# Patient Record
Sex: FEMALE | Race: WHITE | NOT HISPANIC OR LATINO | Employment: FULL TIME | URBAN - METROPOLITAN AREA
[De-identification: names, ages, dates, MRNs, and addresses within clinical notes are randomized per-mention and may not be internally consistent; named-entity substitution may affect disease eponyms.]

---

## 2020-10-13 ENCOUNTER — APPOINTMENT (OUTPATIENT)
Dept: OBGYN | Facility: CLINIC | Age: 25
End: 2020-10-13
Payer: COMMERCIAL

## 2020-10-14 ENCOUNTER — PRENATAL OFFICE VISIT (OUTPATIENT)
Dept: OBGYN | Facility: CLINIC | Age: 25
End: 2020-10-14
Payer: COMMERCIAL

## 2020-10-14 DIAGNOSIS — Z34.00 PRENATAL CARE, FIRST PREGNANCY: ICD-10-CM

## 2020-10-14 PROCEDURE — 99207 PR NO CHARGE NURSE ONLY: CPT | Performed by: OBSTETRICS & GYNECOLOGY

## 2020-10-14 RX ORDER — PRENATAL VIT/IRON FUM/FOLIC AC 27MG-0.8MG
1 TABLET ORAL DAILY
COMMUNITY
Start: 2020-07-30 | End: 2022-08-24

## 2020-10-14 RX ORDER — CETIRIZINE HYDROCHLORIDE 10 MG/1
10 TABLET ORAL DAILY
COMMUNITY

## 2020-10-14 SDOH — HEALTH STABILITY: MENTAL HEALTH: HOW OFTEN DO YOU HAVE 6 OR MORE DRINKS ON ONE OCCASION?: NOT ASKED

## 2020-10-14 SDOH — HEALTH STABILITY: MENTAL HEALTH: HOW OFTEN DO YOU HAVE A DRINK CONTAINING ALCOHOL?: NOT ASKED

## 2020-10-14 SDOH — HEALTH STABILITY: MENTAL HEALTH: HOW MANY STANDARD DRINKS CONTAINING ALCOHOL DO YOU HAVE ON A TYPICAL DAY?: NOT ASKED

## 2020-10-14 NOTE — PROGRESS NOTES
Some teaching done as patient has had only one visit with no labs, US or teaching done'  Rachel Boone OB Intake Nurse

## 2020-10-19 ENCOUNTER — AMBULATORY - HEALTHEAST (OUTPATIENT)
Dept: MATERNAL FETAL MEDICINE | Facility: HOSPITAL | Age: 25
End: 2020-10-19

## 2020-10-19 ENCOUNTER — PRENATAL OFFICE VISIT (OUTPATIENT)
Dept: OBGYN | Facility: CLINIC | Age: 25
End: 2020-10-19
Payer: COMMERCIAL

## 2020-10-19 VITALS
HEART RATE: 101 BPM | BODY MASS INDEX: 30.83 KG/M2 | DIASTOLIC BLOOD PRESSURE: 70 MMHG | RESPIRATION RATE: 18 BRPM | WEIGHT: 174 LBS | HEIGHT: 63 IN | SYSTOLIC BLOOD PRESSURE: 121 MMHG | TEMPERATURE: 98.8 F

## 2020-10-19 DIAGNOSIS — Z34.02 ENCOUNTER FOR PRENATAL CARE IN SECOND TRIMESTER OF FIRST PREGNANCY: Primary | ICD-10-CM

## 2020-10-19 DIAGNOSIS — O26.90 PREGNANCY, ANTEPARTUM, COMPLICATIONS: ICD-10-CM

## 2020-10-19 LAB
ABO + RH BLD: NORMAL
ABO + RH BLD: NORMAL
ALBUMIN UR-MCNC: NEGATIVE MG/DL
APPEARANCE UR: CLEAR
BILIRUB UR QL STRIP: NEGATIVE
BLD GP AB SCN SERPL QL: NORMAL
BLOOD BANK CMNT PATIENT-IMP: NORMAL
COLOR UR AUTO: YELLOW
ERYTHROCYTE [DISTWIDTH] IN BLOOD BY AUTOMATED COUNT: 12.1 % (ref 10–15)
GLUCOSE UR STRIP-MCNC: NEGATIVE MG/DL
HBV SURFACE AG SERPL QL IA: NONREACTIVE
HCT VFR BLD AUTO: 40.8 % (ref 35–47)
HGB BLD-MCNC: 14.1 G/DL (ref 11.7–15.7)
HGB UR QL STRIP: NEGATIVE
HIV 1+2 AB+HIV1 P24 AG SERPL QL IA: NONREACTIVE
KETONES UR STRIP-MCNC: ABNORMAL MG/DL
LEUKOCYTE ESTERASE UR QL STRIP: ABNORMAL
MCH RBC QN AUTO: 30.7 PG (ref 26.5–33)
MCHC RBC AUTO-ENTMCNC: 34.6 G/DL (ref 31.5–36.5)
MCV RBC AUTO: 89 FL (ref 78–100)
MUCOUS THREADS #/AREA URNS LPF: PRESENT /LPF
NITRATE UR QL: NEGATIVE
NON-SQ EPI CELLS #/AREA URNS LPF: ABNORMAL /LPF
PH UR STRIP: 6.5 PH (ref 5–7)
PLATELET # BLD AUTO: 210 10E9/L (ref 150–450)
RBC # BLD AUTO: 4.59 10E12/L (ref 3.8–5.2)
RBC #/AREA URNS AUTO: ABNORMAL /HPF
SOURCE: ABNORMAL
SP GR UR STRIP: 1.02 (ref 1–1.03)
SPECIMEN EXP DATE BLD: NORMAL
UROBILINOGEN UR STRIP-ACNC: 0.2 EU/DL (ref 0.2–1)
WBC # BLD AUTO: 10.4 10E9/L (ref 4–11)
WBC #/AREA URNS AUTO: ABNORMAL /HPF

## 2020-10-19 PROCEDURE — 86901 BLOOD TYPING SEROLOGIC RH(D): CPT | Performed by: OBSTETRICS & GYNECOLOGY

## 2020-10-19 PROCEDURE — 36415 COLL VENOUS BLD VENIPUNCTURE: CPT | Performed by: OBSTETRICS & GYNECOLOGY

## 2020-10-19 PROCEDURE — 85027 COMPLETE CBC AUTOMATED: CPT | Performed by: OBSTETRICS & GYNECOLOGY

## 2020-10-19 PROCEDURE — 99000 SPECIMEN HANDLING OFFICE-LAB: CPT | Performed by: OBSTETRICS & GYNECOLOGY

## 2020-10-19 PROCEDURE — 86850 RBC ANTIBODY SCREEN: CPT | Performed by: OBSTETRICS & GYNECOLOGY

## 2020-10-19 PROCEDURE — 86762 RUBELLA ANTIBODY: CPT | Performed by: OBSTETRICS & GYNECOLOGY

## 2020-10-19 PROCEDURE — 87389 HIV-1 AG W/HIV-1&-2 AB AG IA: CPT | Performed by: OBSTETRICS & GYNECOLOGY

## 2020-10-19 PROCEDURE — 87086 URINE CULTURE/COLONY COUNT: CPT | Performed by: OBSTETRICS & GYNECOLOGY

## 2020-10-19 PROCEDURE — 99207 PR FIRST OB VISIT: CPT | Performed by: OBSTETRICS & GYNECOLOGY

## 2020-10-19 PROCEDURE — 86780 TREPONEMA PALLIDUM: CPT | Mod: 90 | Performed by: OBSTETRICS & GYNECOLOGY

## 2020-10-19 PROCEDURE — 87340 HEPATITIS B SURFACE AG IA: CPT | Performed by: OBSTETRICS & GYNECOLOGY

## 2020-10-19 PROCEDURE — 86900 BLOOD TYPING SEROLOGIC ABO: CPT | Performed by: OBSTETRICS & GYNECOLOGY

## 2020-10-19 PROCEDURE — 81001 URINALYSIS AUTO W/SCOPE: CPT | Performed by: OBSTETRICS & GYNECOLOGY

## 2020-10-19 ASSESSMENT — MIFFLIN-ST. JEOR: SCORE: 1505.45

## 2020-10-19 NOTE — NURSING NOTE
"Initial /70 (BP Location: Right arm, Patient Position: Chair, Cuff Size: Adult Regular)   Pulse 101   Temp 98.8  F (37.1  C) (Tympanic)   Resp 18   Ht 1.588 m (5' 2.5\")   Wt 78.9 kg (174 lb)   LMP 06/18/2020   BMI 31.32 kg/m   Estimated body mass index is 31.32 kg/m  as calculated from the following:    Height as of this encounter: 1.588 m (5' 2.5\").    Weight as of this encounter: 78.9 kg (174 lb). .      "

## 2020-10-19 NOTE — PROGRESS NOTES
"[facility]   OB/GYN Clinic    CC: New OB     Subjective:    Rosibel is a 25 year old  at 17w4d by LMP who presents for [facility] OB visit. She reports feeling well. Denies any uterine cramping, abdominal pain or vaginal bleeding. Denies nausea and vomiting.   Prior to a +UPT, she reports regular monthly periods. Reports some prenatal care at [facility] but states no labs or ultrasounds were done.      OB History    Para Term  AB Living   1 0 0 0 0 0   SAB TAB Ectopic Multiple Live Births   0 0 0 0 0      # Outcome Date GA Lbr Og/2nd Weight Sex Delivery Anes PTL Lv   1 Current                Past Medical History:   Diagnosis Date     Chickenpox        Past Surgical History:   Procedure Laterality Date     ADENOIDECTOMY       LAPAROSCOPIC APPENDECTOMY       MOUTH SURGERY         Current Outpatient Medications   Medication Sig Dispense Refill     cetirizine (ZYRTEC) 10 MG tablet Take 10 mg by mouth daily       Prenatal Vit-Fe Fumarate-FA (PRENATAL MULTIVITAMIN W/IRON) 27-0.8 MG tablet Take 1 tablet by mouth daily         Family History   Problem Relation Age of Onset     Seizure Disorder Mother      Spina bifida Mother      Colon Cancer Mother      Diabetes Mother      No Known Problems Father      Cerebrovascular Disease Maternal Grandmother      Bleeding Disorder Maternal Grandmother         ?history of blood clots     Hypertension Maternal Grandfather        Social History     Tobacco Use     Smoking status: Never Smoker     Smokeless tobacco: Never Used   Substance Use Topics     Alcohol use: Not Currently     Comment: occas-quit with pregnancy       ROS: A 10 pt ROS was completed and found to be negative unless mentioned in the HPI.     Objective:  /70 (BP Location: Right arm, Patient Position: Chair, Cuff Size: Adult Regular)   Pulse 101   Temp 98.8  F (37.1  C) (Tympanic)   Resp 18   Ht 1.588 m (5' 2.5\")   Wt 78.9 kg (174 lb)   LMP 2020   BMI 31.32 kg/m  " "    Estimated body mass index is 31.32 kg/m  as calculated from the following:    Height as of this encounter: 1.588 m (5' 2.5\").    Weight as of this encounter: 78.9 kg (174 lb).    Physical Exam:  Gen: Pleasant, talkative female in no apparent distress   Respiratory: Lungs clear, breathing comfortably on room air   Cardiac: Regular rate, well perfused  GI: Abd soft and non-tender   MSK: Grossly normal movement of all four extremities  Psych: mood and affect bright   Lower extremity: edema not present     Fetal dop tones: 150s bpm      Assessment/Plan:   25 year old  at 17w4d who presents for her initial OB visit.   1) Plan to draw new OB lab today (T&S, CBC, HIV, RPR, HepBsAg, Hep B antibody, rubella, UC). Plan to complete pelvic/GC/Chlam next visit.   2) Discussed routine prenatal care, group practice model at [facility], tertiary support and frequency of visits. Declines genetic testing  3) Plan for level II anatomy US given maternal hx of spina bifida  4) Concerns:    States that she is feeling anxious and sad as FOB not excited about pregnancy, denies thoughts of SI/HI, amenable to visiting with DENIS Rehman, will continue to readdress  5) Medication review: no changes, continue prenatal vitamin   6) PAP smear: NILM 2019  7) Immunizations: flu shot states she already had, Tdap at 28wks    Return to clinic in 4 weeks.     Mary Dunbar MD   10/19/2020 10:43 AM   "

## 2020-10-20 LAB
BACTERIA SPEC CULT: NORMAL
Lab: NORMAL
RUBV IGG SERPL IA-ACNC: 68 IU/ML
SPECIMEN SOURCE: NORMAL
T PALLIDUM AB SER QL: NONREACTIVE

## 2020-10-21 NOTE — RESULT ENCOUNTER NOTE
Will forward to [facility] pool for pt notification of normal result.    Christal Lerner   Ob/Gyn Clinic  RN

## 2020-10-26 ENCOUNTER — OFFICE VISIT - HEALTHEAST (OUTPATIENT)
Dept: MATERNAL FETAL MEDICINE | Facility: HOSPITAL | Age: 25
End: 2020-10-26

## 2020-10-26 ENCOUNTER — RECORDS - HEALTHEAST (OUTPATIENT)
Dept: ULTRASOUND IMAGING | Facility: HOSPITAL | Age: 25
End: 2020-10-26

## 2020-10-26 ENCOUNTER — RECORDS - HEALTHEAST (OUTPATIENT)
Dept: ADMINISTRATIVE | Facility: OTHER | Age: 25
End: 2020-10-26

## 2020-10-26 DIAGNOSIS — O35.8XX0 FAMILY OR MATERNAL HISTORIC RISK OF CONGENITAL ANOMALY, ANTEPARTUM, SINGLE OR UNSPECIFIED FETUS: ICD-10-CM

## 2020-10-26 DIAGNOSIS — O26.90 PREGNANCY RELATED CONDITIONS, UNSPECIFIED, UNSPECIFIED TRIMESTER: ICD-10-CM

## 2020-10-26 DIAGNOSIS — O26.90 PREGNANCY, ANTEPARTUM, COMPLICATIONS: ICD-10-CM

## 2020-10-26 DIAGNOSIS — Z82.79 FAMILY HISTORY OF SPINA BIFIDA: ICD-10-CM

## 2020-11-16 ENCOUNTER — PRENATAL OFFICE VISIT (OUTPATIENT)
Dept: OBGYN | Facility: CLINIC | Age: 25
End: 2020-11-16
Payer: COMMERCIAL

## 2020-11-16 VITALS
BODY MASS INDEX: 32.2 KG/M2 | RESPIRATION RATE: 14 BRPM | WEIGHT: 181.7 LBS | HEIGHT: 63 IN | HEART RATE: 93 BPM | DIASTOLIC BLOOD PRESSURE: 76 MMHG | SYSTOLIC BLOOD PRESSURE: 109 MMHG | TEMPERATURE: 99.2 F

## 2020-11-16 DIAGNOSIS — Z34.02 ENCOUNTER FOR PRENATAL CARE IN SECOND TRIMESTER OF FIRST PREGNANCY: Primary | ICD-10-CM

## 2020-11-16 PROCEDURE — 99207 PR PRENATAL VISIT: CPT | Performed by: OBSTETRICS & GYNECOLOGY

## 2020-11-16 PROCEDURE — 87491 CHLMYD TRACH DNA AMP PROBE: CPT | Performed by: OBSTETRICS & GYNECOLOGY

## 2020-11-16 PROCEDURE — 87591 N.GONORRHOEAE DNA AMP PROB: CPT | Performed by: OBSTETRICS & GYNECOLOGY

## 2020-11-16 ASSESSMENT — MIFFLIN-ST. JEOR: SCORE: 1540.38

## 2020-11-16 NOTE — NURSING NOTE
"Initial /76 (BP Location: Left arm, Patient Position: Sitting, Cuff Size: Adult Regular)   Pulse 93   Temp 99.2  F (37.3  C) (Tympanic)   Resp 14   Ht 1.588 m (5' 2.5\")   Wt 82.4 kg (181 lb 11.2 oz)   LMP 06/18/2020   BMI 32.70 kg/m   Estimated body mass index is 32.7 kg/m  as calculated from the following:    Height as of this encounter: 1.588 m (5' 2.5\").    Weight as of this encounter: 82.4 kg (181 lb 11.2 oz). .      "

## 2020-11-16 NOTE — PROGRESS NOTES
"[facility]   OB/GYN Clinic    CC: Return OB     Subjective:    Rosibel is a 25 year old  at 21w4d  who presents for return OB visit. She reports feeling well. Denies uterine cramping, vaginal bleeding or leaking, dysuria. +fetal movement.   Objective:  /76 (BP Location: Left arm, Patient Position: Sitting, Cuff Size: Adult Regular)   Pulse 93   Temp 99.2  F (37.3  C) (Tympanic)   Resp 14   Ht 1.588 m (5' 2.5\")   Wt 82.4 kg (181 lb 11.2 oz)   LMP 2020   BMI 32.70 kg/m      Estimated body mass index is 32.7 kg/m  as calculated from the following:    Height as of this encounter: 1.588 m (5' 2.5\").    Weight as of this encounter: 82.4 kg (181 lb 11.2 oz).    Physical Exam:  Gen: Pleasant, talkative female in no apparent distress   Respiratory: breathing comfortably on room air   Cardiac: Warm and well-perfused.   GI: Abd soft and non-tender, gravid  : normal appearing external genitalia, vaginal musoca, cervix  MSK: Grossly normal movement of all four extremities  Psych: mood and affect bright   Lower extremity: edema not present     Fetal dop tones: 150s bpm      Assessment/Plan:   25 year old  at 21w4d who presents for her initial OB visit.   1) Plan to draw new OB lab today (T&S, CBC, HIV, RPR, HepBsAg, Hep B antibody, rubella, UC). GC/Chlam collected today  2) Discussed routine prenatal care, group practice model at [facility], tertiary support and frequency of visits. Declines genetic testing  3) Level II WNL  4) Concerns:               States that she is feeling anxious and sad as FOB not excited about pregnancy, denies thoughts of SI/HI, amenable to visiting with DENIS Rehman, will continue to readdress  5) Medication review: no changes, continue prenatal vitamin   6) PAP smear: NILM   7) Immunizations: flu shot already given, Tdap at 28wks    Return to clinic in 4 weeks.     Mary Dunbar MD  2020 9:56 AM       "

## 2020-11-17 LAB
C TRACH DNA SPEC QL NAA+PROBE: NEGATIVE
N GONORRHOEA DNA SPEC QL NAA+PROBE: NEGATIVE
SPECIMEN SOURCE: NORMAL
SPECIMEN SOURCE: NORMAL

## 2020-12-14 ENCOUNTER — PRENATAL OFFICE VISIT (OUTPATIENT)
Dept: OBGYN | Facility: CLINIC | Age: 25
End: 2020-12-14
Payer: COMMERCIAL

## 2020-12-14 VITALS
BODY MASS INDEX: 34.02 KG/M2 | SYSTOLIC BLOOD PRESSURE: 115 MMHG | WEIGHT: 189 LBS | HEART RATE: 103 BPM | DIASTOLIC BLOOD PRESSURE: 82 MMHG | TEMPERATURE: 98.5 F

## 2020-12-14 DIAGNOSIS — Z34.02 ENCOUNTER FOR PRENATAL CARE IN SECOND TRIMESTER OF FIRST PREGNANCY: ICD-10-CM

## 2020-12-14 DIAGNOSIS — Z34.92 PRENATAL CARE, SECOND TRIMESTER: Primary | ICD-10-CM

## 2020-12-14 LAB
ERYTHROCYTE [DISTWIDTH] IN BLOOD BY AUTOMATED COUNT: 12.2 % (ref 10–15)
GLUCOSE 1H P 50 G GLC PO SERPL-MCNC: 104 MG/DL (ref 60–129)
HCT VFR BLD AUTO: 37.3 % (ref 35–47)
HGB BLD-MCNC: 12.6 G/DL (ref 11.7–15.7)
MCH RBC QN AUTO: 31.3 PG (ref 26.5–33)
MCHC RBC AUTO-ENTMCNC: 33.8 G/DL (ref 31.5–36.5)
MCV RBC AUTO: 93 FL (ref 78–100)
PLATELET # BLD AUTO: 241 10E9/L (ref 150–450)
RBC # BLD AUTO: 4.03 10E12/L (ref 3.8–5.2)
WBC # BLD AUTO: 13.4 10E9/L (ref 4–11)

## 2020-12-14 PROCEDURE — 99000 SPECIMEN HANDLING OFFICE-LAB: CPT | Performed by: OBSTETRICS & GYNECOLOGY

## 2020-12-14 PROCEDURE — 86780 TREPONEMA PALLIDUM: CPT | Performed by: OBSTETRICS & GYNECOLOGY

## 2020-12-14 PROCEDURE — 36415 COLL VENOUS BLD VENIPUNCTURE: CPT | Performed by: OBSTETRICS & GYNECOLOGY

## 2020-12-14 PROCEDURE — 85027 COMPLETE CBC AUTOMATED: CPT | Performed by: OBSTETRICS & GYNECOLOGY

## 2020-12-14 PROCEDURE — 82950 GLUCOSE TEST: CPT | Performed by: OBSTETRICS & GYNECOLOGY

## 2020-12-14 PROCEDURE — 99207 PR PRENATAL VISIT: CPT | Performed by: OBSTETRICS & GYNECOLOGY

## 2020-12-14 NOTE — NURSING NOTE
"Initial /82 (BP Location: Right arm, Patient Position: Chair, Cuff Size: Adult Large)   Pulse 103   Temp 98.5  F (36.9  C) (Tympanic)   Wt 85.7 kg (189 lb)   LMP 06/18/2020   Breastfeeding No   BMI 34.02 kg/m   Estimated body mass index is 34.02 kg/m  as calculated from the following:    Height as of 11/16/20: 1.588 m (5' 2.5\").    Weight as of this encounter: 85.7 kg (189 lb). .    Chani Hayes MA    "

## 2020-12-14 NOTE — LETTER
[facility]  [address] [address]  Phone: [phone]      December 14, 2020      Rosibel Saeed  [address] [address]              To whom it may concern:    Rosibel Saeed is being seen in our clinic for prenatal care.  Her Estimated Date of Delivery: Mar 25, 2021.  Her Last menstrual period was Patient's last menstrual period was 06/18/2020..      Sincerely,    Mary Dunbar MD

## 2020-12-14 NOTE — PROGRESS NOTES
[facility]   OB/GYN Clinic     CC: Return OB      Subjective:     Rosibel is a 25 year old  at 25w4d who presents for return OB visit. She reports feeling well. Denies uterine cramping, vaginal bleeding or leaking, dysuria. +fetal movement.     Objective:  /82 (BP Location: Right arm, Patient Position: Chair, Cuff Size: Adult Large)   Pulse 103   Temp 98.5  F (36.9  C) (Tympanic)   Wt 85.7 kg (189 lb)   LMP 2020   Breastfeeding No   BMI 34.02 kg/m        Physical Exam:  Gen: Pleasant, talkative female in no apparent distress   Respiratory: breathing comfortably on room air   Cardiac: Warm and well-perfused.   GI: Abd soft and non-tender, gravid  : normal appearing external genitalia, vaginal musoca, cervix  MSK: Grossly normal movement of all four extremities  Psych: mood and affect bright   Lower extremity: edema not present      Fetal dop tones: 150s bpm        Assessment/Plan:   25 year old  at 25w4d who presents for her initial OB visit.   1) new OB WNL  2) Discussed routine prenatal care, group practice model at [facility], tertiary support and frequency of visits. Declines genetic testing  3) Level II WNL  4) Concerns:               States that she is feeling anxious and sad as FOB not excited about pregnancy, denies thoughts of SI/HI, amenable to visiting with DENIS Rehman, will continue to readdress  5) Medication review: no changes, continue prenatal vitamin   6) PAP smear: NILM 2019  7) Immunizations: flu shot already given, Tdap at 28wks     Return to clinic in 4 weeks.     Mary Dunbar MD   2020 3:52 PM

## 2020-12-15 LAB — T PALLIDUM AB SER QL: NONREACTIVE

## 2021-01-04 ENCOUNTER — HEALTH MAINTENANCE LETTER (OUTPATIENT)
Age: 26
End: 2021-01-04

## 2021-01-11 ENCOUNTER — PRENATAL OFFICE VISIT (OUTPATIENT)
Dept: OBGYN | Facility: CLINIC | Age: 26
End: 2021-01-11
Payer: COMMERCIAL

## 2021-01-11 VITALS
RESPIRATION RATE: 14 BRPM | HEIGHT: 63 IN | DIASTOLIC BLOOD PRESSURE: 71 MMHG | TEMPERATURE: 98.7 F | BODY MASS INDEX: 35.15 KG/M2 | SYSTOLIC BLOOD PRESSURE: 115 MMHG | WEIGHT: 198.4 LBS | HEART RATE: 106 BPM

## 2021-01-11 DIAGNOSIS — Z34.03 ENCOUNTER FOR PRENATAL CARE IN THIRD TRIMESTER OF FIRST PREGNANCY: Primary | ICD-10-CM

## 2021-01-11 DIAGNOSIS — Z23 NEED FOR TDAP VACCINATION: ICD-10-CM

## 2021-01-11 PROCEDURE — 90715 TDAP VACCINE 7 YRS/> IM: CPT | Performed by: OBSTETRICS & GYNECOLOGY

## 2021-01-11 PROCEDURE — 90471 IMMUNIZATION ADMIN: CPT | Performed by: OBSTETRICS & GYNECOLOGY

## 2021-01-11 PROCEDURE — 99207 PR PRENATAL VISIT: CPT | Performed by: OBSTETRICS & GYNECOLOGY

## 2021-01-11 RX ORDER — ACETAMINOPHEN 325 MG/1
325-650 TABLET ORAL EVERY 6 HOURS PRN
Status: ON HOLD | COMMUNITY
End: 2021-04-01

## 2021-01-11 ASSESSMENT — MIFFLIN-ST. JEOR: SCORE: 1616.13

## 2021-01-11 NOTE — PROGRESS NOTES
Prior to immunization administration, verified patients identity using patient s name and date of birth. Please see Immunization Activity for additional information.     Screening Questionnaire for Adult Immunization    Are you sick today?   No   Do you have allergies to medications, food, a vaccine component or latex?   No   Have you ever had a serious reaction after receiving a vaccination?   No   Do you have a long-term health problem with heart, lung, kidney, or metabolic disease (e.g., diabetes), asthma, a blood disorder, no spleen, complement component deficiency, a cochlear implant, or a spinal fluid leak?  Are you on long-term aspirin therapy?   No   Do you have cancer, leukemia, HIV/AIDS, or any other immune system problem?   No   Do you have a parent, brother, or sister with an immune system problem?   No   In the past 3 months, have you taken medications that affect  your immune system, such as prednisone, other steroids, or anticancer drugs; drugs for the treatment of rheumatoid arthritis, Crohn s disease, or psoriasis; or have you had radiation treatments?   No   Have you had a seizure, or a brain or other nervous system problem?   No   During the past year, have you received a transfusion of blood or blood    products, or been given immune (gamma) globulin or antiviral drug?   No   For women: Are you pregnant or is there a chance you could become       pregnant during the next month?   Yes   Have you received any vaccinations in the past 4 weeks?   No       Per orders of Dr. Engle, injection of Tdap given by Dyan Ac LPN. Patient instructed to remain in clinic for 15 minutes afterwards, and to report any adverse reaction to me immediately.       Screening performed by Dyan Ac LPN on 1/11/2021 at 4:17 PM.

## 2021-01-11 NOTE — PROGRESS NOTES
"[facility]   OB/GYN Clinic     CC: Return OB      Subjective:     Rosibel is a 25 year old  at 29w4d who presents for return OB visit. She reports feeling well. Denies uterine cramping, vaginal bleeding or leaking, dysuria. +fetal movement.      Objective:  /71 (BP Location: Right arm, Patient Position: Chair, Cuff Size: Adult Large)   Pulse 106   Temp 98.7  F (37.1  C) (Tympanic)   Resp 14   Ht 1.588 m (5' 2.5\")   Wt 90 kg (198 lb 6.4 oz)   LMP 2020   BMI 35.71 kg/m       Physical Exam:  Gen: Pleasant, talkative female in no apparent distress   Respiratory: breathing comfortably on room air   Cardiac: Warm and well-perfused.   GI: Abd soft and non-tender, gravid  : normal appearing external genitalia, vaginal musoca, cervix  MSK: Grossly normal movement of all four extremities  Psych: mood and affect bright   Lower extremity: edema not present      See OB flowsheet     Assessment/Plan:   25 year old  at 29w4d who presents for her initial OB visit.   1) new OB WNL  2) Discussed routine prenatal care, group practice model at [facility], tertiary support and frequency of visits. Declines genetic testing  3) Level II WNL  4) Concerns: none  5) Medication review: no changes, continue prenatal vitamin   6) PAP smear: NILM 2019  7) Immunizations: flu shot already given, s/p Tdap   8) 3rd tri labs nl     Return to clinic in 2 weeks    Usha Engle MD  OB/GYN   "

## 2021-01-25 ENCOUNTER — PRENATAL OFFICE VISIT (OUTPATIENT)
Dept: OBGYN | Facility: CLINIC | Age: 26
End: 2021-01-25
Payer: COMMERCIAL

## 2021-01-25 VITALS
RESPIRATION RATE: 14 BRPM | TEMPERATURE: 98.7 F | BODY MASS INDEX: 35.44 KG/M2 | WEIGHT: 200 LBS | HEART RATE: 109 BPM | SYSTOLIC BLOOD PRESSURE: 111 MMHG | HEIGHT: 63 IN | DIASTOLIC BLOOD PRESSURE: 72 MMHG

## 2021-01-25 DIAGNOSIS — Z34.03 ENCOUNTER FOR PRENATAL CARE IN THIRD TRIMESTER OF FIRST PREGNANCY: Primary | ICD-10-CM

## 2021-01-25 PROCEDURE — 99207 PR PRENATAL VISIT: CPT | Performed by: OBSTETRICS & GYNECOLOGY

## 2021-01-25 ASSESSMENT — MIFFLIN-ST. JEOR: SCORE: 1623.38

## 2021-01-25 NOTE — PROGRESS NOTES
"[facility]   OB/GYN Clinic    CC: Return OB     Subjective:    Rosibel is a 25 year old  at 31w4d who presents for return OB visit. She reports feeling well. Denies uterine cramping, vaginal bleeding or leaking, dysuria. +fetal movement.     Objective:  /72 (BP Location: Right arm, Patient Position: Chair, Cuff Size: Adult Regular)   Pulse 109   Temp 98.7  F (37.1  C) (Tympanic)   Resp 14   Ht 1.588 m (5' 2.5\")   Wt 90.7 kg (200 lb)   LMP 2020   BMI 36.00 kg/m      Estimated body mass index is 36 kg/m  as calculated from the following:    Height as of this encounter: 1.588 m (5' 2.5\").    Weight as of this encounter: 90.7 kg (200 lb).    Physical Exam:  Gen: Pleasant, talkative female in no apparent distress   Respiratory: breathing comfortably on room air   Cardiac: Warm and well-perfused.   GI: Abd soft and non-tender, gravid  MSK: Grossly normal movement of all four extremities  Psych: mood and affect bright   Lower extremity: edema not present     Fetal dop tones: 130s bpm  Fundal height: 28    Assessment/Plan:   25 year old  at 31w4d who presents for her initial OB visit.   1) new OB WNL  2) Discussed routine prenatal care, group practice model at [facility], tertiary support and frequency of visits. Declines genetic testing  3) Level II WNL  4) Concerns: none  5) Medication review: no changes, continue prenatal vitamin   6) PAP smear: NILM   7) Immunizations: flu shot already given, s/p Tdap   8) 3rd tri labs nl  9) S<D, growth ordered    Return to clinic in 2 weeks.     Mary Dunbar MD   2021 10:35 AM     "

## 2021-01-25 NOTE — PROGRESS NOTES
"Initial /72 (BP Location: Right arm, Patient Position: Chair, Cuff Size: Adult Regular)   Pulse 109   Temp 98.7  F (37.1  C) (Tympanic)   Resp 14   Ht 1.588 m (5' 2.5\")   Wt 90.7 kg (200 lb)   LMP 06/18/2020   BMI 36.00 kg/m   Estimated body mass index is 36 kg/m  as calculated from the following:    Height as of this encounter: 1.588 m (5' 2.5\").    Weight as of this encounter: 90.7 kg (200 lb). .      "

## 2021-02-01 ENCOUNTER — HOSPITAL ENCOUNTER (OUTPATIENT)
Dept: ULTRASOUND IMAGING | Facility: CLINIC | Age: 26
Discharge: HOME | End: 2021-02-01
Attending: OBSTETRICS & GYNECOLOGY | Admitting: OBSTETRICS & GYNECOLOGY
Payer: COMMERCIAL

## 2021-02-01 DIAGNOSIS — Z34.03 ENCOUNTER FOR PRENATAL CARE IN THIRD TRIMESTER OF FIRST PREGNANCY: ICD-10-CM

## 2021-02-01 PROCEDURE — 76816 OB US FOLLOW-UP PER FETUS: CPT

## 2021-02-15 ENCOUNTER — PRENATAL OFFICE VISIT (OUTPATIENT)
Dept: OBGYN | Facility: CLINIC | Age: 26
End: 2021-02-15
Payer: COMMERCIAL

## 2021-02-15 VITALS
HEART RATE: 101 BPM | WEIGHT: 206.6 LBS | RESPIRATION RATE: 16 BRPM | BODY MASS INDEX: 36.61 KG/M2 | SYSTOLIC BLOOD PRESSURE: 107 MMHG | DIASTOLIC BLOOD PRESSURE: 77 MMHG | HEIGHT: 63 IN | TEMPERATURE: 98.9 F

## 2021-02-15 DIAGNOSIS — Z34.03 PRENATAL CARE, FIRST PREGNANCY, THIRD TRIMESTER: Primary | ICD-10-CM

## 2021-02-15 PROCEDURE — 99207 PR PRENATAL VISIT: CPT | Performed by: OBSTETRICS & GYNECOLOGY

## 2021-02-15 ASSESSMENT — MIFFLIN-ST. JEOR: SCORE: 1653.32

## 2021-02-15 NOTE — NURSING NOTE
"Initial /77 (BP Location: Left arm, Patient Position: Chair, Cuff Size: Adult Large)   Pulse 101   Temp 98.9  F (37.2  C) (Tympanic)   Resp 16   Ht 1.588 m (5' 2.5\")   Wt 93.7 kg (206 lb 9.6 oz)   LMP 06/18/2020   Breastfeeding No   BMI 37.19 kg/m   Estimated body mass index is 37.19 kg/m  as calculated from the following:    Height as of this encounter: 1.588 m (5' 2.5\").    Weight as of this encounter: 93.7 kg (206 lb 9.6 oz). .  Iesha Byrd CMA      "

## 2021-02-15 NOTE — PROGRESS NOTES
"[facility]   OB/GYN Clinic     CC: Return OB      Subjective:     Rosibel is a 25 year old  at 34w4d who presents for return OB visit. She reports feeling well. Denies uterine cramping, vaginal bleeding or leaking, dysuria. +fetal movement.      Objective:  /77 (BP Location: Left arm, Patient Position: Chair, Cuff Size: Adult Large)   Pulse 101   Temp 98.9  F (37.2  C) (Tympanic)   Resp 16   Ht 1.588 m (5' 2.5\")   Wt 93.7 kg (206 lb 9.6 oz)   LMP 2020   Breastfeeding No   BMI 37.19 kg/m       Physical Exam:  Gen: Pleasant, talkative female in no apparent distress   Respiratory: breathing comfortably on room air   Cardiac: Warm and well-perfused.   GI: Abd soft and non-tender, gravid  : normal appearing external genitalia, vaginal musoca, cervix  MSK: Grossly normal movement of all four extremities  Psych: mood and affect bright   Lower extremity: edema not present      See OB flowsheet     Assessment/Plan:   25 year old  at 34w4d who presents for F/OB visit.   1) new OB WNL  2) Discussed routine prenatal care, group practice model at [facility], tertiary support and frequency of visits. Declines genetic testing  3) Level II WNL  4) Concerns: none  5) Medication review: no changes, continue prenatal vitamin   6) PAP smear: NILM 2019  7) Immunizations: flu shot already given, s/p Tdap   8) 3rd tri labs nl     Return to clinic in 1 week     Usha Engle MD  OB/GYN   "

## 2021-02-22 ENCOUNTER — PRENATAL OFFICE VISIT (OUTPATIENT)
Dept: OBGYN | Facility: CLINIC | Age: 26
End: 2021-02-22
Payer: COMMERCIAL

## 2021-02-22 VITALS
WEIGHT: 208.7 LBS | HEART RATE: 104 BPM | BODY MASS INDEX: 36.98 KG/M2 | DIASTOLIC BLOOD PRESSURE: 86 MMHG | RESPIRATION RATE: 14 BRPM | TEMPERATURE: 99.4 F | SYSTOLIC BLOOD PRESSURE: 122 MMHG | HEIGHT: 63 IN

## 2021-02-22 DIAGNOSIS — Z34.93 PRENATAL CARE, THIRD TRIMESTER: Primary | ICD-10-CM

## 2021-02-22 PROCEDURE — 99207 PR PRENATAL VISIT: CPT | Performed by: OBSTETRICS & GYNECOLOGY

## 2021-02-22 RX ORDER — CALCIUM CARBONATE 500 MG/1
1 TABLET, CHEWABLE ORAL 2 TIMES DAILY
Status: ON HOLD | COMMUNITY
End: 2021-04-01

## 2021-02-22 ASSESSMENT — MIFFLIN-ST. JEOR: SCORE: 1662.85

## 2021-02-22 NOTE — PROGRESS NOTES
"[facility]   OB/GYN Clinic     CC: Return OB      Subjective:     Rosibel is a 25 year old  at 35w4d  who presents for return OB visit. She reports feeling well. Denies uterine cramping, vaginal bleeding or leaking, dysuria. +fetal movement. Occ headaches, improve with rest.     Objective:  /86 (BP Location: Left arm, Patient Position: Chair, Cuff Size: Adult Large)   Pulse 104   Temp 99.4  F (37.4  C) (Tympanic)   Resp 14   Ht 1.588 m (5' 2.5\")   Wt 94.7 kg (208 lb 11.2 oz)   LMP 2020   BMI 37.56 kg/m        Physical Exam:  Gen: Pleasant, talkative female in no apparent distress   Respiratory: breathing comfortably on room air   Cardiac: Warm and well-perfused.   GI: Abd soft and non-tender, gravid  MSK: Grossly normal movement of all four extremities  Psych: mood and affect bright   Lower extremity: edema not present      Fetal dop tones: 140s bpm  Fundal height: 32     Assessment/Plan:   25 year old  at 35w4d who presents for her initial OB visit.   1) new OB WNL  2) Discussed routine prenatal care, group practice model at [facility], tertiary support and frequency of visits. Declines genetic testing  3) Level II WNL  4) Concerns: none  5) Medication review: no changes, continue prenatal vitamin   6) PAP smear: NILM   7) Immunizations: flu shot already given, s/p Tdap   8) 3rd tri labs nl   9) S<D, growth WNL    Return to clinic in 1 weeks.     Mary Dunbar MD   2021 1:31 PM   "

## 2021-02-22 NOTE — PROGRESS NOTES
"Initial /86 (BP Location: Left arm, Patient Position: Chair, Cuff Size: Adult Large)   Pulse 104   Temp 99.4  F (37.4  C) (Tympanic)   Resp 14   Ht 1.588 m (5' 2.5\")   Wt 94.7 kg (208 lb 11.2 oz)   LMP 06/18/2020   BMI 37.56 kg/m   Estimated body mass index is 37.56 kg/m  as calculated from the following:    Height as of this encounter: 1.588 m (5' 2.5\").    Weight as of this encounter: 94.7 kg (208 lb 11.2 oz). .      "

## 2021-03-01 ENCOUNTER — PRENATAL OFFICE VISIT (OUTPATIENT)
Dept: OBGYN | Facility: CLINIC | Age: 26
End: 2021-03-01
Payer: COMMERCIAL

## 2021-03-01 VITALS
DIASTOLIC BLOOD PRESSURE: 78 MMHG | WEIGHT: 210 LBS | BODY MASS INDEX: 37.21 KG/M2 | RESPIRATION RATE: 18 BRPM | HEIGHT: 63 IN | SYSTOLIC BLOOD PRESSURE: 117 MMHG | TEMPERATURE: 98.4 F | HEART RATE: 102 BPM

## 2021-03-01 DIAGNOSIS — Z34.93 PRENATAL CARE, THIRD TRIMESTER: Primary | ICD-10-CM

## 2021-03-01 PROCEDURE — 87653 STREP B DNA AMP PROBE: CPT | Performed by: OBSTETRICS & GYNECOLOGY

## 2021-03-01 PROCEDURE — 99207 PR PRENATAL VISIT: CPT | Performed by: OBSTETRICS & GYNECOLOGY

## 2021-03-01 ASSESSMENT — MIFFLIN-ST. JEOR: SCORE: 1668.74

## 2021-03-01 NOTE — NURSING NOTE
"Initial /78 (BP Location: Right arm, Patient Position: Chair, Cuff Size: Adult Large)   Pulse 102   Temp 98.4  F (36.9  C) (Tympanic)   Resp 18   Ht 1.588 m (5' 2.5\")   Wt 95.3 kg (210 lb)   LMP 06/18/2020   Breastfeeding No   BMI 37.80 kg/m   Estimated body mass index is 37.8 kg/m  as calculated from the following:    Height as of this encounter: 1.588 m (5' 2.5\").    Weight as of this encounter: 95.3 kg (210 lb). .      "

## 2021-03-01 NOTE — PROGRESS NOTES
"CC: Here for routine prenatal visit @ 36w4d   HPI:  Feeling well; some C; reviewed s/s of labor, when to call BC    PE: /78 (BP Location: Right arm, Patient Position: Chair, Cuff Size: Adult Large)   Pulse 102   Temp 98.4  F (36.9  C) (Tympanic)   Resp 18   Ht 1.588 m (5' 2.5\")   Wt 95.3 kg (210 lb)   LMP 06/18/2020   Breastfeeding No   BMI 37.80 kg/m     See OB flowsheet      A:  1. Prenatal care, third trimester    - Strep, Group B by PCR      Routine prenatal care  RTC 1 weeks.      Lynne Shah M.D.     "

## 2021-03-02 LAB
GP B STREP DNA SPEC QL NAA+PROBE: NEGATIVE
SPECIMEN SOURCE: NORMAL

## 2021-03-12 ENCOUNTER — PRENATAL OFFICE VISIT (OUTPATIENT)
Dept: OBGYN | Facility: CLINIC | Age: 26
End: 2021-03-12
Payer: COMMERCIAL

## 2021-03-12 VITALS
HEIGHT: 63 IN | TEMPERATURE: 98.5 F | HEART RATE: 89 BPM | SYSTOLIC BLOOD PRESSURE: 122 MMHG | BODY MASS INDEX: 37.76 KG/M2 | DIASTOLIC BLOOD PRESSURE: 78 MMHG | RESPIRATION RATE: 18 BRPM | WEIGHT: 213.1 LBS

## 2021-03-12 DIAGNOSIS — Z34.93 PRENATAL CARE, THIRD TRIMESTER: Primary | ICD-10-CM

## 2021-03-12 PROCEDURE — 99207 PR PRENATAL VISIT: CPT | Performed by: OBSTETRICS & GYNECOLOGY

## 2021-03-12 ASSESSMENT — MIFFLIN-ST. JEOR: SCORE: 1677.81

## 2021-03-12 NOTE — NURSING NOTE
"Initial /78 (BP Location: Right arm, Patient Position: Chair, Cuff Size: Adult Regular)   Pulse 89   Temp 98.5  F (36.9  C) (Tympanic)   Resp 18   Ht 1.588 m (5' 2.5\")   Wt 96.7 kg (213 lb 1.6 oz)   LMP 06/18/2020   Breastfeeding No   BMI 38.36 kg/m   Estimated body mass index is 38.36 kg/m  as calculated from the following:    Height as of this encounter: 1.588 m (5' 2.5\").    Weight as of this encounter: 96.7 kg (213 lb 1.6 oz). .    Iesha Byrd CMA    "

## 2021-03-12 NOTE — PROGRESS NOTES
"[facility]   OB/GYN Clinic     CC: Return OB      Subjective:     Rosibel is a 25 year old  38w1d a who presents for return OB visit. She reports feeling well. Denies uterine cramping, vaginal bleeding or leaking, dysuria. +fetal movement.      Objective:  /78 (BP Location: Right arm, Patient Position: Chair, Cuff Size: Adult Regular)   Pulse 89   Temp 98.5  F (36.9  C) (Tympanic)   Resp 18   Ht 1.588 m (5' 2.5\")   Wt 96.7 kg (213 lb 1.6 oz)   LMP 2020   Breastfeeding No   BMI 38.36 kg/m        Physical Exam:  Gen: Pleasant, talkative female in no apparent distress   Respiratory: breathing comfortably on room air   Cardiac: Warm and well-perfused.   GI: Abd soft and non-tender, gravid  MSK: Grossly normal movement of all four extremities  Psych: mood and affect bright   Lower extremity: edema not present      Fetal dop tones: 140s  bpm  Fundal height 37  SVE: declines      Assessment/Plan:   25 year old  at 38w1d who presents for her initial OB visit.   1) new OB WNL  2) Level II WNL  3) Medication review: no changes, continue prenatal vitamin   4) PAP smear: NILM 2019  5) Immunizations: flu shot already given, s/p Tdap   6) 3rd tri labs nl   7) S<D, growth WNL    Mary Dunbar MD   3/12/2021 3:18 PM   "

## 2021-03-16 ENCOUNTER — NURSE TRIAGE (OUTPATIENT)
Dept: OBGYN | Facility: CLINIC | Age: 26
End: 2021-03-16

## 2021-03-16 NOTE — TELEPHONE ENCOUNTER
Call transferred to RN for vaginal bleeding during pregnancy. Patient noted a small amount of dark brown to dark red blood on toilet paper when wiping today, she had also seen this on Sunday afternoon, but amount was slightly larger today. Denies recent sexual intercourse or pelvic exam. She did feel some uterine pressure just before using the bathroom, but no contractions. She does have occasional cramps but these are relieved with a position change. Fetal movement is unchanged.     Home care advice given to patient per Nurse Triage protocol and reviewed reasons to call back. Confirmed patient has future appointments scheduled for prenatal care.     Next 5 appointments (look out 90 days)    Mar 19, 2021  9:30 AM  ESTABLISHED PRENATAL with Mary Dunbar MD  [facility] ([facility] [address] [address]  [phone]   Mar 26, 2021  9:30 AM  ESTABLISHED PRENATAL with Usha Engle MD  [facility] ([facility] ) [address] [address]  [phone]         Will monitor to OB clinic as FYI. Please call patient with any further recommendations.     Additional Information    Negative: Passed out (i.e., fainted, collapsed and was not responding)    Negative: Shock suspected (e.g., cold/pale/clammy skin, too weak to stand, low BP, rapid pulse)    Negative: Difficult to awaken or acting confused (e.g., disoriented, slurred speech)    Negative: SEVERE vaginal bleeding (e.g., continuous red blood from vagina, or large blood clots)    Negative: SEVERE constant abdominal pain (e.g., excruciating) and present > 1 hour    Negative: Sounds like a life-threatening emergency to the triager    Negative: Vaginal bleeding and pregnant < 20 weeks    Negative: MILD-MODERATE vaginal bleeding (i.e., small to medium clots; like mild menstrual period)     "Negative: Abdominal pain or having contractions    Negative: Leakage of fluid from vagina (or caller thinks she has ruptured her bag of sen)    Negative: Baby moving less today (e.g., kick count < 5 in 1 hour or < 10 in 2 hours) and 23 or more weeks pregnant    Negative: Pregnant 24-36 weeks () and pinkish or brownish mucous discharge    Negative: Patient sounds very sick or weak to the triager    Pregnant > 36 weeks and pinkish or brownish mucous discharge    Answer Assessment - Initial Assessment Questions  1. ONSET: \"When did this bleeding start?\"         Small amount on , slightly more this afternoon  2. DESCRIPTION: \"Describe the bleeding that you are having.\" \"How much bleeding is there?\"     - SPOTTING: spotting, or pinkish / brownish mucous discharge; does not fill panti-liner or pad     - MILD:  less than 1 pad / hour; less than patient's usual menstrual bleeding    - MODERATE: 1-2 pads / hour; small-medium blood clots (e.g., pea, grape, small coin)     - SEVERE: soaking 2 or more pads/hour for 2 or more hours; bleeding not contained by pads or continuous red blood from vagina; large blood clots (e.g., golf ball, large coin)       Spotting, dark brown to red blood on toilet paper when wiping  3. ABDOMINAL PAIN SEVERITY: If present, ask: \"How bad is it?\"  (e.g., Scale 1-10; mild, moderate, or severe)    - MILD (1-3): doesn't interfere with normal activities, abdomen soft and not tender to touch     - MODERATE (4-7): interferes with normal activities or awakens from sleep, tender to touch     - SEVERE (8-10): excruciating pain, doubled over, unable to do any normal activities      Mild intermittent cramping, goes away with position changes. She did feel some pressure on uterus just before she noticed the bleeding  4. PREGNANCY: \"Do you know how many weeks or months pregnant you are?\"       38 weeks 5 days  5. HNAY: \"What date are you expecting to deliver?\"      3/25  6. FETAL MOVEMENT: \"Has the " "baby's movement decreased or changed significantly from normal?\"      Normal fetal movement  7. HEMODYNAMIC STATUS: \"Are you weak or feeling lightheaded?\" If so, ask: \"Can you stand and walk normally?\"       No  8. OTHER SYMPTOMS: \"What other symptoms are you having with the bleeding?\" (e.g., leaking fluid from vagina, contractions)      none    Protocols used: PREGNANCY - VAGINAL BLEEDING GREATER THAN 20 WEEKS EGA-A-OH      "

## 2021-03-17 NOTE — TELEPHONE ENCOUNTER
Called patient to check in on her and her bleeding. No answer. Did leave detailed message of reason for call. Requested patient call back if having further concerns or bleeding for earlier appointment- currently scheduled 3/19     Bo AQUINO RN   Specialty Clinics

## 2021-03-19 ENCOUNTER — PRENATAL OFFICE VISIT (OUTPATIENT)
Dept: OBGYN | Facility: CLINIC | Age: 26
End: 2021-03-19
Payer: COMMERCIAL

## 2021-03-19 VITALS
HEART RATE: 95 BPM | BODY MASS INDEX: 37.81 KG/M2 | DIASTOLIC BLOOD PRESSURE: 82 MMHG | WEIGHT: 213.4 LBS | HEIGHT: 63 IN | SYSTOLIC BLOOD PRESSURE: 125 MMHG | RESPIRATION RATE: 16 BRPM | TEMPERATURE: 97.9 F

## 2021-03-19 DIAGNOSIS — Z34.93 PRENATAL CARE, THIRD TRIMESTER: Primary | ICD-10-CM

## 2021-03-19 PROCEDURE — 99207 PR PRENATAL VISIT: CPT | Performed by: OBSTETRICS & GYNECOLOGY

## 2021-03-19 ASSESSMENT — MIFFLIN-ST. JEOR: SCORE: 1679.17

## 2021-03-19 NOTE — PROGRESS NOTES
"[facility]   OB/GYN Clinic     CC: Return OB      Subjective:     Rosibel is a 25 year old  39w1d a who presents for return OB visit. She reports feeling well. Denies uterine cramping, vaginal bleeding or leaking, dysuria. +fetal movement.      Objective:  Blood pressure 125/82, pulse 95, temperature 97.9  F (36.6  C), temperature source Tympanic, resp. rate 16, height 1.588 m (5' 2.5\"), weight 96.8 kg (213 lb 6.4 oz), last menstrual period 2020, not currently breastfeeding.       Physical Exam:  Gen: Pleasant, talkative female in no apparent distress   Respiratory: breathing comfortably on room air   Cardiac: Warm and well-perfused.   GI: Abd soft and non-tender, gravid  MSK: Grossly normal movement of all four extremities  Psych: mood and affect bright   Lower extremity: edema not present      Fetal dop tones: 140s  bpm  Fundal height 38  SVE: 1/50/-2 soft, MP     Assessment/Plan:   25 year old  at 39w1d who presents for her initial OB visit.   1) new OB WNL  2) Level II WNL  3) Medication review: no changes, continue prenatal vitamin   4) PAP smear: NILM 2019  5) Immunizations: flu shot already given, s/p Tdap   6) 3rd tri labs nl   7) S<D, growth WNL       RTC weekly     Mary Dunbar MD   3/19/2021 9:44 AM   "

## 2021-03-19 NOTE — NURSING NOTE
"Initial /82 (BP Location: Right arm, Patient Position: Chair, Cuff Size: Adult Regular)   Pulse 95   Temp 97.9  F (36.6  C) (Tympanic)   Resp 16   Ht 1.588 m (5' 2.5\")   Wt 96.8 kg (213 lb 6.4 oz)   LMP 06/18/2020   Breastfeeding No   BMI 38.41 kg/m   Estimated body mass index is 38.41 kg/m  as calculated from the following:    Height as of this encounter: 1.588 m (5' 2.5\").    Weight as of this encounter: 96.8 kg (213 lb 6.4 oz). .    Iesha Byrd CMA    "

## 2021-03-26 ENCOUNTER — PRENATAL OFFICE VISIT (OUTPATIENT)
Dept: OBGYN | Facility: CLINIC | Age: 26
End: 2021-03-26
Payer: COMMERCIAL

## 2021-03-26 VITALS
SYSTOLIC BLOOD PRESSURE: 130 MMHG | DIASTOLIC BLOOD PRESSURE: 75 MMHG | TEMPERATURE: 98 F | BODY MASS INDEX: 37.63 KG/M2 | RESPIRATION RATE: 14 BRPM | HEIGHT: 63 IN | HEART RATE: 92 BPM | WEIGHT: 212.4 LBS

## 2021-03-26 DIAGNOSIS — Z34.90 ENCOUNTER FOR PLANNED INDUCTION OF LABOR: Primary | ICD-10-CM

## 2021-03-26 PROCEDURE — 99207 PR PRENATAL VISIT: CPT | Performed by: OBSTETRICS & GYNECOLOGY

## 2021-03-26 RX ORDER — ONDANSETRON 2 MG/ML
4 INJECTION INTRAMUSCULAR; INTRAVENOUS EVERY 6 HOURS PRN
Status: CANCELLED | OUTPATIENT
Start: 2021-03-26

## 2021-03-26 RX ORDER — ACETAMINOPHEN 325 MG/1
650 TABLET ORAL EVERY 4 HOURS PRN
Status: CANCELLED | OUTPATIENT
Start: 2021-03-26

## 2021-03-26 RX ORDER — OXYTOCIN/0.9 % SODIUM CHLORIDE 30/500 ML
100-340 PLASTIC BAG, INJECTION (ML) INTRAVENOUS CONTINUOUS PRN
Status: CANCELLED | OUTPATIENT
Start: 2021-03-26

## 2021-03-26 RX ORDER — TRANEXAMIC ACID 10 MG/ML
1 INJECTION, SOLUTION INTRAVENOUS EVERY 30 MIN PRN
Status: CANCELLED | OUTPATIENT
Start: 2021-03-26

## 2021-03-26 RX ORDER — METHYLERGONOVINE MALEATE 0.2 MG/ML
200 INJECTION INTRAVENOUS
Status: CANCELLED | OUTPATIENT
Start: 2021-03-26

## 2021-03-26 RX ORDER — OXYTOCIN 10 [USP'U]/ML
10 INJECTION, SOLUTION INTRAMUSCULAR; INTRAVENOUS
Status: CANCELLED | OUTPATIENT
Start: 2021-03-26

## 2021-03-26 RX ORDER — NALOXONE HYDROCHLORIDE 0.4 MG/ML
0.4 INJECTION, SOLUTION INTRAMUSCULAR; INTRAVENOUS; SUBCUTANEOUS
Status: CANCELLED | OUTPATIENT
Start: 2021-03-26

## 2021-03-26 RX ORDER — CARBOPROST TROMETHAMINE 250 UG/ML
250 INJECTION, SOLUTION INTRAMUSCULAR
Status: CANCELLED | OUTPATIENT
Start: 2021-03-26

## 2021-03-26 RX ORDER — SODIUM CHLORIDE, SODIUM LACTATE, POTASSIUM CHLORIDE, CALCIUM CHLORIDE 600; 310; 30; 20 MG/100ML; MG/100ML; MG/100ML; MG/100ML
INJECTION, SOLUTION INTRAVENOUS CONTINUOUS
Status: CANCELLED | OUTPATIENT
Start: 2021-03-26

## 2021-03-26 RX ORDER — NALOXONE HYDROCHLORIDE 0.4 MG/ML
0.2 INJECTION, SOLUTION INTRAMUSCULAR; INTRAVENOUS; SUBCUTANEOUS
Status: CANCELLED | OUTPATIENT
Start: 2021-03-26

## 2021-03-26 RX ORDER — IBUPROFEN 400 MG/1
800 TABLET, FILM COATED ORAL
Status: CANCELLED | OUTPATIENT
Start: 2021-03-26

## 2021-03-26 RX ORDER — OXYCODONE AND ACETAMINOPHEN 5; 325 MG/1; MG/1
1 TABLET ORAL
Status: CANCELLED | OUTPATIENT
Start: 2021-03-26

## 2021-03-26 RX ORDER — FENTANYL CITRATE 50 UG/ML
50-100 INJECTION, SOLUTION INTRAMUSCULAR; INTRAVENOUS
Status: CANCELLED | OUTPATIENT
Start: 2021-03-26

## 2021-03-26 RX ORDER — MISOPROSTOL 100 UG/1
25 TABLET ORAL
Status: CANCELLED | OUTPATIENT
Start: 2021-03-26

## 2021-03-26 ASSESSMENT — MIFFLIN-ST. JEOR: SCORE: 1674.63

## 2021-03-26 NOTE — PROGRESS NOTES
"Initial /75 (BP Location: Left arm, Patient Position: Chair, Cuff Size: Adult Regular)   Pulse 92   Temp 98  F (36.7  C) (Tympanic)   Resp 14   Ht 1.588 m (5' 2.5\")   Wt 96.3 kg (212 lb 6.4 oz)   LMP 06/18/2020   BMI 38.23 kg/m   Estimated body mass index is 38.23 kg/m  as calculated from the following:    Height as of this encounter: 1.588 m (5' 2.5\").    Weight as of this encounter: 96.3 kg (212 lb 6.4 oz). .      "

## 2021-03-26 NOTE — PROGRESS NOTES
"[facility]   OB/GYN Clinic     CC: Return OB      Subjective:     Rosibel is a 25 year old  401w1d a who presents for return OB visit. She reports feeling well. Denies uterine cramping, vaginal bleeding or leaking, dysuria. +fetal movement.      Objective:  /75 (BP Location: Left arm, Patient Position: Chair, Cuff Size: Adult Regular)   Pulse 92   Temp 98  F (36.7  C) (Tympanic)   Resp 14   Ht 1.588 m (5' 2.5\")   Wt 96.3 kg (212 lb 6.4 oz)   LMP 2020   BMI 38.23 kg/m       Physical Exam:  Gen: Pleasant, talkative female in no apparent distress   Respiratory: breathing comfortably on room air   Cardiac: Warm and well-perfused.   GI: Abd soft and non-tender, gravid  MSK: Grossly normal movement of all four extremities  Psych: mood and affect bright   Lower extremity: edema not present         Assessment/Plan:   25 year old  at 40w1d who presents for f/u OB visit.   1) new OB WNL  2) US WNL  3) Medication review: no changes, continue prenatal vitamin   4) PAP smear: NILM 2019  5) Immunizations: flu shot already given, s/p Tdap   6) 3rd tri labs nl   7) S<D, growth WNL  8) Late term gestation: discussed IOL at 41 weeks if she fails to labor spontaneously, discussed likely need for cervical ripening with oral misoprostol. Discussed pitocin once favorable. Orders placed. COVID swab ordered.    9) Knows labor analgesia options, planning breastfeeding      Labor precautions reviewed    Usha Engle MD  OB/GYN  "

## 2021-03-28 DIAGNOSIS — Z34.90 ENCOUNTER FOR PLANNED INDUCTION OF LABOR: ICD-10-CM

## 2021-03-28 LAB
SARS-COV-2 RNA RESP QL NAA+PROBE: NORMAL
SPECIMEN SOURCE: NORMAL

## 2021-03-28 PROCEDURE — U0005 INFEC AGEN DETEC AMPLI PROBE: HCPCS | Performed by: OBSTETRICS & GYNECOLOGY

## 2021-03-28 PROCEDURE — U0003 INFECTIOUS AGENT DETECTION BY NUCLEIC ACID (DNA OR RNA); SEVERE ACUTE RESPIRATORY SYNDROME CORONAVIRUS 2 (SARS-COV-2) (CORONAVIRUS DISEASE [COVID-19]), AMPLIFIED PROBE TECHNIQUE, MAKING USE OF HIGH THROUGHPUT TECHNOLOGIES AS DESCRIBED BY CMS-2020-01-R: HCPCS | Performed by: OBSTETRICS & GYNECOLOGY

## 2021-03-29 LAB
LABORATORY COMMENT REPORT: NORMAL
SARS-COV-2 RNA RESP QL NAA+PROBE: NEGATIVE
SPECIMEN SOURCE: NORMAL

## 2021-03-31 ENCOUNTER — HOSPITAL ENCOUNTER (INPATIENT)
Facility: CLINIC | Age: 26
LOS: 3 days | Discharge: HOME | End: 2021-04-03
Attending: OBSTETRICS & GYNECOLOGY | Admitting: OBSTETRICS & GYNECOLOGY
Payer: COMMERCIAL

## 2021-03-31 PROBLEM — O48.0 POST TERM PREGNANCY: Status: ACTIVE | Noted: 2021-03-31

## 2021-03-31 LAB
ABO + RH BLD: NORMAL
ABO + RH BLD: NORMAL
BASOPHILS # BLD AUTO: 0 10E9/L (ref 0–0.2)
BASOPHILS NFR BLD AUTO: 0.2 %
BLD GP AB SCN SERPL QL: NORMAL
BLOOD BANK CMNT PATIENT-IMP: NORMAL
DIFFERENTIAL METHOD BLD: ABNORMAL
EOSINOPHIL # BLD AUTO: 0 10E9/L (ref 0–0.7)
EOSINOPHIL NFR BLD AUTO: 0.3 %
ERYTHROCYTE [DISTWIDTH] IN BLOOD BY AUTOMATED COUNT: 12.4 % (ref 10–15)
HCT VFR BLD AUTO: 36.6 % (ref 35–47)
HGB BLD-MCNC: 11.9 G/DL (ref 11.7–15.7)
IMM GRANULOCYTES # BLD: 0.1 10E9/L (ref 0–0.4)
IMM GRANULOCYTES NFR BLD: 0.4 %
LYMPHOCYTES # BLD AUTO: 2.6 10E9/L (ref 0.8–5.3)
LYMPHOCYTES NFR BLD AUTO: 21.1 %
MCH RBC QN AUTO: 29.4 PG (ref 26.5–33)
MCHC RBC AUTO-ENTMCNC: 32.5 G/DL (ref 31.5–36.5)
MCV RBC AUTO: 90 FL (ref 78–100)
MONOCYTES # BLD AUTO: 1.1 10E9/L (ref 0–1.3)
MONOCYTES NFR BLD AUTO: 8.6 %
NEUTROPHILS # BLD AUTO: 8.6 10E9/L (ref 1.6–8.3)
NEUTROPHILS NFR BLD AUTO: 69.4 %
NRBC # BLD AUTO: 0 10*3/UL
NRBC BLD AUTO-RTO: 0 /100
PLATELET # BLD AUTO: 274 10E9/L (ref 150–450)
RBC # BLD AUTO: 4.05 10E12/L (ref 3.8–5.2)
SPECIMEN EXP DATE BLD: NORMAL
T PALLIDUM AB SER QL: NONREACTIVE
WBC # BLD AUTO: 12.4 10E9/L (ref 4–11)

## 2021-03-31 PROCEDURE — 250N000013 HC RX MED GY IP 250 OP 250 PS 637: Performed by: OBSTETRICS & GYNECOLOGY

## 2021-03-31 PROCEDURE — 86780 TREPONEMA PALLIDUM: CPT | Performed by: OBSTETRICS & GYNECOLOGY

## 2021-03-31 PROCEDURE — 86901 BLOOD TYPING SEROLOGIC RH(D): CPT | Performed by: OBSTETRICS & GYNECOLOGY

## 2021-03-31 PROCEDURE — 85025 COMPLETE CBC W/AUTO DIFF WBC: CPT | Performed by: OBSTETRICS & GYNECOLOGY

## 2021-03-31 PROCEDURE — 36415 COLL VENOUS BLD VENIPUNCTURE: CPT | Performed by: OBSTETRICS & GYNECOLOGY

## 2021-03-31 PROCEDURE — 86850 RBC ANTIBODY SCREEN: CPT | Performed by: OBSTETRICS & GYNECOLOGY

## 2021-03-31 PROCEDURE — 86900 BLOOD TYPING SEROLOGIC ABO: CPT | Performed by: OBSTETRICS & GYNECOLOGY

## 2021-03-31 PROCEDURE — 120N000001 HC R&B MED SURG/OB

## 2021-03-31 RX ORDER — IBUPROFEN 800 MG/1
800 TABLET, FILM COATED ORAL
Status: DISCONTINUED | OUTPATIENT
Start: 2021-03-31 | End: 2021-04-01

## 2021-03-31 RX ORDER — NALOXONE HYDROCHLORIDE 0.4 MG/ML
0.2 INJECTION, SOLUTION INTRAMUSCULAR; INTRAVENOUS; SUBCUTANEOUS
Status: DISCONTINUED | OUTPATIENT
Start: 2021-03-31 | End: 2021-04-01

## 2021-03-31 RX ORDER — FENTANYL CITRATE 50 UG/ML
50-100 INJECTION, SOLUTION INTRAMUSCULAR; INTRAVENOUS
Status: DISCONTINUED | OUTPATIENT
Start: 2021-03-31 | End: 2021-04-01

## 2021-03-31 RX ORDER — NALOXONE HYDROCHLORIDE 0.4 MG/ML
0.4 INJECTION, SOLUTION INTRAMUSCULAR; INTRAVENOUS; SUBCUTANEOUS
Status: DISCONTINUED | OUTPATIENT
Start: 2021-03-31 | End: 2021-04-01

## 2021-03-31 RX ORDER — OXYTOCIN 10 [USP'U]/ML
10 INJECTION, SOLUTION INTRAMUSCULAR; INTRAVENOUS
Status: DISCONTINUED | OUTPATIENT
Start: 2021-03-31 | End: 2021-04-01

## 2021-03-31 RX ORDER — OXYTOCIN/0.9 % SODIUM CHLORIDE 30/500 ML
100-340 PLASTIC BAG, INJECTION (ML) INTRAVENOUS CONTINUOUS PRN
Status: COMPLETED | OUTPATIENT
Start: 2021-03-31 | End: 2021-04-01

## 2021-03-31 RX ORDER — HYDROXYZINE HYDROCHLORIDE 50 MG/1
50 TABLET, FILM COATED ORAL EVERY 6 HOURS PRN
Status: DISCONTINUED | OUTPATIENT
Start: 2021-03-31 | End: 2021-04-01

## 2021-03-31 RX ORDER — METHYLERGONOVINE MALEATE 0.2 MG/ML
200 INJECTION INTRAVENOUS
Status: DISCONTINUED | OUTPATIENT
Start: 2021-03-31 | End: 2021-04-01

## 2021-03-31 RX ORDER — SODIUM CHLORIDE, SODIUM LACTATE, POTASSIUM CHLORIDE, CALCIUM CHLORIDE 600; 310; 30; 20 MG/100ML; MG/100ML; MG/100ML; MG/100ML
INJECTION, SOLUTION INTRAVENOUS CONTINUOUS
Status: DISCONTINUED | OUTPATIENT
Start: 2021-03-31 | End: 2021-04-01

## 2021-03-31 RX ORDER — OXYCODONE AND ACETAMINOPHEN 5; 325 MG/1; MG/1
1 TABLET ORAL
Status: DISCONTINUED | OUTPATIENT
Start: 2021-03-31 | End: 2021-04-01

## 2021-03-31 RX ORDER — ACETAMINOPHEN 325 MG/1
650 TABLET ORAL EVERY 4 HOURS PRN
Status: DISCONTINUED | OUTPATIENT
Start: 2021-03-31 | End: 2021-04-01

## 2021-03-31 RX ORDER — CARBOPROST TROMETHAMINE 250 UG/ML
250 INJECTION, SOLUTION INTRAMUSCULAR
Status: DISCONTINUED | OUTPATIENT
Start: 2021-03-31 | End: 2021-04-01

## 2021-03-31 RX ORDER — TRANEXAMIC ACID 10 MG/ML
1 INJECTION, SOLUTION INTRAVENOUS EVERY 30 MIN PRN
Status: DISCONTINUED | OUTPATIENT
Start: 2021-03-31 | End: 2021-04-01

## 2021-03-31 RX ORDER — ONDANSETRON 2 MG/ML
4 INJECTION INTRAMUSCULAR; INTRAVENOUS EVERY 6 HOURS PRN
Status: DISCONTINUED | OUTPATIENT
Start: 2021-03-31 | End: 2021-04-01

## 2021-03-31 RX ORDER — MISOPROSTOL 100 UG/1
25 TABLET ORAL
Status: DISCONTINUED | OUTPATIENT
Start: 2021-03-31 | End: 2021-04-01 | Stop reason: CLARIF

## 2021-03-31 RX ADMIN — Medication 25 MCG: at 16:31

## 2021-03-31 RX ADMIN — Medication 25 MCG: at 18:26

## 2021-03-31 RX ADMIN — Medication 25 MCG: at 14:18

## 2021-03-31 RX ADMIN — Medication 25 MCG: at 20:26

## 2021-03-31 RX ADMIN — Medication 25 MCG: at 10:14

## 2021-03-31 RX ADMIN — Medication 25 MCG: at 22:34

## 2021-03-31 RX ADMIN — Medication 25 MCG: at 12:18

## 2021-03-31 RX ADMIN — Medication 25 MCG: at 08:22

## 2021-03-31 ASSESSMENT — ACTIVITIES OF DAILY LIVING (ADL)
FALL_HISTORY_WITHIN_LAST_SIX_MONTHS: NO
TOILETING_ISSUES: NO

## 2021-03-31 ASSESSMENT — MIFFLIN-ST. JEOR: SCORE: 1660.79

## 2021-03-31 NOTE — PLAN OF CARE
Patient and S.O. oriented to room and POC. Patient denies pain or contractions. SVE done, jasso 4. Will start cervical ripening. Saline lock placed, labs drawn.  Dr. Caba in room to see patient.

## 2021-03-31 NOTE — PLAN OF CARE
Misoprostol given every 2 hours through the day, patient now starting to feel some discomfort in lower back.  Patient turned to left side with ice to lower back.  , cat. 1.  Contractions every 1.5 to 4 minutes. Patient aware of order for vistaril for sleep if needed tonight.

## 2021-03-31 NOTE — PLAN OF CARE
Rosibel Saeed  [id number]  40w6d        Rosibel Saeed presents for induction of labor for post dates. Patient denies contractions, bleeding or ROM.     Past Medical History:   Diagnosis Date    Chickenpox        Dr Caba notified of patient's arrival and status.    Plan:  -cervical ripening.

## 2021-03-31 NOTE — PLAN OF CARE
, cat. 1, contractions every 2-3 minutes, patient not feeling them. Patient ambulated in halls, and sat on birthing ball. Dr. Caba updated. Misoprostol given every 2 hours.

## 2021-03-31 NOTE — H&P
[facility] OB/GYN Department    History and Physical Note    Rosibel Saeed  1995  [id number]    HPI: Rosibel Saeed is a 26 year old  at 40w6d by LMP c/w 18w4d US, who presents for scheduled IOL for post dates. + FM, no ctx, no VB, no LOF.  She denies fever, HA, blurry vision, nausea, vomiting, CP, SOB, RUQ pain, constipation, diarrhea, and acute swelling.        Pregnancy has been uncomplicated.    OBHX:   OB History    Para Term  AB Living   1 0 0 0 0 0   SAB TAB Ectopic Multiple Live Births   0 0 0 0 0      # Outcome Date GA Lbr Og/2nd Weight Sex Delivery Anes PTL Lv   1 Current                MedicalHX:   Past Medical History:   Diagnosis Date     Chickenpox        SurgicalHX:   Past Surgical History:   Procedure Laterality Date     ADENOIDECTOMY       LAPAROSCOPIC APPENDECTOMY       MOUTH SURGERY         Medications:   No current facility-administered medications on file prior to encounter.   acetaminophen (TYLENOL) 325 MG tablet, Take 325-650 mg by mouth every 6 hours as needed for mild pain  calcium carbonate (TUMS) 500 MG chewable tablet, Take 1 chew tab by mouth 2 times daily  cetirizine (ZYRTEC) 10 MG tablet, Take 10 mg by mouth daily  Prenatal Vit-Fe Fumarate-FA (PRENATAL MULTIVITAMIN W/IRON) 27-0.8 MG tablet, Take 1 tablet by mouth daily        Allergies:  Allergies   Allergen Reactions     Amoxicillin Rash       FamilyHX:  Family History   Problem Relation Age of Onset     Seizure Disorder Mother      Spina bifida Mother      Colon Cancer Mother      Diabetes Mother      No Known Problems Father      Cerebrovascular Disease Maternal Grandmother      Bleeding Disorder Maternal Grandmother         ?history of blood clots     Hypertension Maternal Grandfather        SocialHX:   Social History     Socioeconomic History     Marital status: Single     Spouse name: Not on file     Number of children: Not on file     Years of education: Not on file     Highest  "education level: Not on file   Occupational History     Not on file   Social Needs     Financial resource strain: Not on file     Food insecurity     Worry: Not on file     Inability: Not on file     Transportation needs     Medical: Not on file     Non-medical: Not on file   Tobacco Use     Smoking status: Never Smoker     Smokeless tobacco: Never Used   Substance and Sexual Activity     Alcohol use: Not Currently     Comment: occas-quit with pregnancy     Drug use: Never     Sexual activity: Yes     Partners: Male   Lifestyle     Physical activity     Days per week: Not on file     Minutes per session: Not on file     Stress: Not on file   Relationships     Social connections     Talks on phone: Not on file     Gets together: Not on file     Attends Zoroastrian service: Not on file     Active member of club or organization: Not on file     Attends meetings of clubs or organizations: Not on file     Relationship status: Not on file     Intimate partner violence     Fear of current or ex partner: Not on file     Emotionally abused: Not on file     Physically abused: Not on file     Forced sexual activity: Not on file   Other Topics Concern     Not on file   Social History Narrative     Not on file       ROS: 10-point ROS negative except as in HPI     Physical Exam:  Vitals:    03/31/21 0739 03/31/21 0930   BP:  112/76   Pulse: 93 93   Resp: 16    Temp: 97.9  F (36.6  C)    Weight: 95.8 kg (211 lb 1.6 oz)    Height: 1.575 m (5' 2\")      GEN: resting comfortably in bed, NAD   CV: RRR, no murmurs  PULM: No increased work of breathing, no cough/wheeze   ABD: soft, gravid, non-tender, non-distended  EXT: no edema, non-tender to palpation  SVE: 1/50/-2, posterior, Kaye score 4  Presentation: cephalic by SVE    NST:  FHT: baseline 135 bpm, moderate variability, + accels, no decels  TOCO: q2-6min    Labs:        Lab Results   Component Value Date    ABO A 03/31/2021    RH Pos 03/31/2021    AS Neg 03/31/2021    HEPBANG " Nonreactive 10/19/2020    CHPCRT Negative 2020    GCPCRT Negative 2020    HGB 11.9 2021       GBS Status:   Lab Results   Component Value Date    GBS Negative 2021         A/P: Rosibel Saeed is a 26 year old female  at 40w6d, here for IOL for post dates pregnancy.     Admit to L&D. Place PIV. Admission labs. COVID negative.   Labor: Misoprostol for cervical ripening.   FHT: Category 1 FHT.  Continue EFM and toco  Pain: Analgesia PRN  GBS:   Negative    Luisana Caba DO

## 2021-04-01 ENCOUNTER — ANESTHESIA EVENT (OUTPATIENT)
Dept: OBGYN | Facility: CLINIC | Age: 26
End: 2021-04-01
Payer: COMMERCIAL

## 2021-04-01 ENCOUNTER — ANESTHESIA (OUTPATIENT)
Dept: OBGYN | Facility: CLINIC | Age: 26
End: 2021-04-01
Payer: COMMERCIAL

## 2021-04-01 PROBLEM — H52.223 REGULAR ASTIGMATISM OF BOTH EYES: Status: ACTIVE | Noted: 2017-05-15

## 2021-04-01 PROBLEM — Z34.00 PRENATAL CARE, FIRST PREGNANCY: Status: ACTIVE | Noted: 2020-10-14

## 2021-04-01 PROBLEM — H35.419 LATTICE DEGENERATION: Status: ACTIVE | Noted: 2021-04-01

## 2021-04-01 PROCEDURE — 0DQR0ZZ REPAIR ANAL SPHINCTER, OPEN APPROACH: ICD-10-PCS | Performed by: OBSTETRICS & GYNECOLOGY

## 2021-04-01 PROCEDURE — 258N000003 HC RX IP 258 OP 636: Performed by: OBSTETRICS & GYNECOLOGY

## 2021-04-01 PROCEDURE — 59400 OBSTETRICAL CARE: CPT | Performed by: OBSTETRICS & GYNECOLOGY

## 2021-04-01 PROCEDURE — 250N000013 HC RX MED GY IP 250 OP 250 PS 637: Performed by: OBSTETRICS & GYNECOLOGY

## 2021-04-01 PROCEDURE — 120N000001 HC R&B MED SURG/OB

## 2021-04-01 PROCEDURE — 250N000011 HC RX IP 250 OP 636: Performed by: NURSE ANESTHETIST, CERTIFIED REGISTERED

## 2021-04-01 PROCEDURE — 250N000009 HC RX 250: Performed by: OBSTETRICS & GYNECOLOGY

## 2021-04-01 PROCEDURE — 370N000003 HC ANESTHESIA WARD SERVICE: Performed by: NURSE ANESTHETIST, CERTIFIED REGISTERED

## 2021-04-01 PROCEDURE — 999N000011 HC STATISTIC ANESTHESIA CASE

## 2021-04-01 PROCEDURE — 250N000009 HC RX 250: Performed by: NURSE ANESTHETIST, CERTIFIED REGISTERED

## 2021-04-01 PROCEDURE — 722N000001 HC LABOR CARE VAGINAL DELIVERY SINGLE

## 2021-04-01 RX ORDER — MODIFIED LANOLIN
OINTMENT (GRAM) TOPICAL
Status: DISCONTINUED | OUTPATIENT
Start: 2021-04-01 | End: 2021-04-03 | Stop reason: HOSPADM

## 2021-04-01 RX ORDER — BISACODYL 10 MG
10 SUPPOSITORY, RECTAL RECTAL DAILY PRN
Status: DISCONTINUED | OUTPATIENT
Start: 2021-04-03 | End: 2021-04-03 | Stop reason: HOSPADM

## 2021-04-01 RX ORDER — PRENATAL VIT/IRON FUM/FOLIC AC 27MG-0.8MG
1 TABLET ORAL DAILY
Status: DISCONTINUED | OUTPATIENT
Start: 2021-04-01 | End: 2021-04-03 | Stop reason: HOSPADM

## 2021-04-01 RX ORDER — OXYTOCIN/0.9 % SODIUM CHLORIDE 30/500 ML
100 PLASTIC BAG, INJECTION (ML) INTRAVENOUS CONTINUOUS
Status: DISCONTINUED | OUTPATIENT
Start: 2021-04-01 | End: 2021-04-03 | Stop reason: HOSPADM

## 2021-04-01 RX ORDER — AMOXICILLIN 250 MG
2 CAPSULE ORAL 2 TIMES DAILY
Status: DISCONTINUED | OUTPATIENT
Start: 2021-04-01 | End: 2021-04-03 | Stop reason: HOSPADM

## 2021-04-01 RX ORDER — NALBUPHINE HYDROCHLORIDE 10 MG/ML
2.5-5 INJECTION, SOLUTION INTRAMUSCULAR; INTRAVENOUS; SUBCUTANEOUS EVERY 6 HOURS PRN
Status: DISCONTINUED | OUTPATIENT
Start: 2021-04-01 | End: 2021-04-01

## 2021-04-01 RX ORDER — NALOXONE HYDROCHLORIDE 0.4 MG/ML
0.4 INJECTION, SOLUTION INTRAMUSCULAR; INTRAVENOUS; SUBCUTANEOUS
Status: DISCONTINUED | OUTPATIENT
Start: 2021-04-01 | End: 2021-04-03 | Stop reason: HOSPADM

## 2021-04-01 RX ORDER — OXYCODONE HYDROCHLORIDE 5 MG/1
5 TABLET ORAL EVERY 4 HOURS PRN
Status: DISCONTINUED | OUTPATIENT
Start: 2021-04-01 | End: 2021-04-03 | Stop reason: HOSPADM

## 2021-04-01 RX ORDER — AMOXICILLIN 250 MG
1 CAPSULE ORAL 2 TIMES DAILY
Status: DISCONTINUED | OUTPATIENT
Start: 2021-04-01 | End: 2021-04-03 | Stop reason: HOSPADM

## 2021-04-01 RX ORDER — LIDOCAINE HYDROCHLORIDE 10 MG/ML
INJECTION, SOLUTION INFILTRATION; PERINEURAL PRN
Status: DISCONTINUED | OUTPATIENT
Start: 2021-04-01 | End: 2021-04-01

## 2021-04-01 RX ORDER — HYDROCORTISONE 2.5 %
CREAM (GRAM) TOPICAL 3 TIMES DAILY PRN
Status: DISCONTINUED | OUTPATIENT
Start: 2021-04-01 | End: 2021-04-03 | Stop reason: HOSPADM

## 2021-04-01 RX ORDER — LIDOCAINE HYDROCHLORIDE 10 MG/ML
INJECTION, SOLUTION EPIDURAL; INFILTRATION; INTRACAUDAL; PERINEURAL
Status: DISPENSED
Start: 2021-04-01 | End: 2021-04-02

## 2021-04-01 RX ORDER — NALOXONE HYDROCHLORIDE 0.4 MG/ML
0.2 INJECTION, SOLUTION INTRAMUSCULAR; INTRAVENOUS; SUBCUTANEOUS
Status: DISCONTINUED | OUTPATIENT
Start: 2021-04-01 | End: 2021-04-03 | Stop reason: HOSPADM

## 2021-04-01 RX ORDER — OXYTOCIN/0.9 % SODIUM CHLORIDE 30/500 ML
340 PLASTIC BAG, INJECTION (ML) INTRAVENOUS CONTINUOUS PRN
Status: DISCONTINUED | OUTPATIENT
Start: 2021-04-01 | End: 2021-04-03 | Stop reason: HOSPADM

## 2021-04-01 RX ORDER — TERBUTALINE SULFATE 1 MG/ML
0.25 INJECTION, SOLUTION SUBCUTANEOUS
Status: DISCONTINUED | OUTPATIENT
Start: 2021-04-01 | End: 2021-04-01

## 2021-04-01 RX ORDER — OXYTOCIN 10 [USP'U]/ML
10 INJECTION, SOLUTION INTRAMUSCULAR; INTRAVENOUS
Status: DISCONTINUED | OUTPATIENT
Start: 2021-04-01 | End: 2021-04-03 | Stop reason: HOSPADM

## 2021-04-01 RX ORDER — EPHEDRINE SULFATE 50 MG/ML
5 INJECTION, SOLUTION INTRAMUSCULAR; INTRAVENOUS; SUBCUTANEOUS
Status: DISCONTINUED | OUTPATIENT
Start: 2021-04-01 | End: 2021-04-01

## 2021-04-01 RX ORDER — ACETAMINOPHEN 325 MG/1
650 TABLET ORAL EVERY 4 HOURS PRN
Status: DISCONTINUED | OUTPATIENT
Start: 2021-04-01 | End: 2021-04-03 | Stop reason: HOSPADM

## 2021-04-01 RX ORDER — TRANEXAMIC ACID 10 MG/ML
1 INJECTION, SOLUTION INTRAVENOUS EVERY 30 MIN PRN
Status: DISCONTINUED | OUTPATIENT
Start: 2021-04-01 | End: 2021-04-03 | Stop reason: HOSPADM

## 2021-04-01 RX ORDER — IBUPROFEN 800 MG/1
800 TABLET, FILM COATED ORAL EVERY 6 HOURS PRN
Status: DISCONTINUED | OUTPATIENT
Start: 2021-04-01 | End: 2021-04-03 | Stop reason: HOSPADM

## 2021-04-01 RX ORDER — LIDOCAINE HYDROCHLORIDE AND EPINEPHRINE 15; 5 MG/ML; UG/ML
INJECTION, SOLUTION EPIDURAL PRN
Status: DISCONTINUED | OUTPATIENT
Start: 2021-04-01 | End: 2021-04-01

## 2021-04-01 RX ORDER — MISOPROSTOL 200 UG/1
400 TABLET ORAL
Status: DISCONTINUED | OUTPATIENT
Start: 2021-04-01 | End: 2021-04-03 | Stop reason: HOSPADM

## 2021-04-01 RX ORDER — OXYTOCIN/0.9 % SODIUM CHLORIDE 30/500 ML
1-24 PLASTIC BAG, INJECTION (ML) INTRAVENOUS CONTINUOUS
Status: DISCONTINUED | OUTPATIENT
Start: 2021-04-01 | End: 2021-04-01

## 2021-04-01 RX ADMIN — Medication 25 MCG: at 00:33

## 2021-04-01 RX ADMIN — Medication: at 04:08

## 2021-04-01 RX ADMIN — Medication 2 MILLI-UNITS/MIN: at 07:34

## 2021-04-01 RX ADMIN — SODIUM CHLORIDE, POTASSIUM CHLORIDE, SODIUM LACTATE AND CALCIUM CHLORIDE 1000 ML: 600; 310; 30; 20 INJECTION, SOLUTION INTRAVENOUS at 03:47

## 2021-04-01 RX ADMIN — LIDOCAINE HYDROCHLORIDE 50 MG: 10 INJECTION, SOLUTION INFILTRATION; PERINEURAL at 03:55

## 2021-04-01 RX ADMIN — HYDROXYZINE HYDROCHLORIDE 50 MG: 50 TABLET, FILM COATED ORAL at 00:33

## 2021-04-01 RX ADMIN — IBUPROFEN 800 MG: 800 TABLET ORAL at 19:33

## 2021-04-01 RX ADMIN — SODIUM CHLORIDE, POTASSIUM CHLORIDE, SODIUM LACTATE AND CALCIUM CHLORIDE 500 ML: 600; 310; 30; 20 INJECTION, SOLUTION INTRAVENOUS at 11:09

## 2021-04-01 RX ADMIN — DOCUSATE SODIUM AND SENNOSIDES 1 TABLET: 8.6; 5 TABLET ORAL at 19:33

## 2021-04-01 RX ADMIN — LIDOCAINE HYDROCHLORIDE AND EPINEPHRINE 75 MG: 15; 5 INJECTION, SOLUTION EPIDURAL at 04:07

## 2021-04-01 RX ADMIN — Medication 8 ML: at 04:08

## 2021-04-01 RX ADMIN — IBUPROFEN 800 MG: 800 TABLET ORAL at 13:38

## 2021-04-01 RX ADMIN — Medication: at 09:49

## 2021-04-01 RX ADMIN — Medication 340 ML/HR: at 11:57

## 2021-04-01 NOTE — PROGRESS NOTES
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  We have limited history and documentation of her eye condition.  Spoke with Dr. Peralta at [facility] who said that valsava should be safe for her.      Archana Ellington M.D.

## 2021-04-01 NOTE — PROGRESS NOTES
Patient complete. 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 MD at bedside and made aware of maternal condition.

## 2021-04-01 NOTE — PROGRESS NOTES
Data: Vital signs within normal limits. Postpartum checks within normal limits - see flow record. Patient  is tolerating po intake. Patient has voided once since straight cath at delivery. Patient ambulating independently..   No apparent signs of infection. Lac 3rd degree healing well. Patient is performing self cares and is able to care for infant. Positive attachment behaviors are observed with infant. Support persons are present.  Action:  Pain plan was discussed. Patient will request pain med when she is ready for it. Patient was medicated during the shift for pain. See MAR.Patient education done about breastfeeding,  cares, postpartum cares, pain management/plan,  safety and rest. See flow record.  Response:   Patient reassessed within 1 hour after each medication for pain. Patient stated that pain had improved. Patient stated that she was comfortable. .   Plan: Anticipate discharge on 4/3/21.

## 2021-04-01 NOTE — PLAN OF CARE
0230 patient due for miso but having painful contractions. Writer checked cervix and jasso score of 10 (see flowsheet). Patient wanted to sit in tub bath prior to getting epidural placed and dosed.

## 2021-04-01 NOTE — PLAN OF CARE
Assumed care of patient at 1920 after introduction and bedside report completed. Patient has been ambulating in dial with s/o and tolerating well. Will continue with POC.

## 2021-04-01 NOTE — L&D DELIVERY NOTE
Delivery Summary    Rosibel Saeed MRN# [id number]   Age: 26 year old YOB: 1995     ASSESSMENT & PLAN: 26 year old  presented @ 41w0d to BC for induction of labor due to post-term pregnancy.     Stage 1: Initial cervical exam 0-1/50/-2/posterior.  Received misoprostol cervical ripening followed by pitocin.  Normal labor progress.  Epidural for analgesia.   Stage 2: labored down x 2 hrs.  Pushed x 10 min.     of viable male, 8#2, Apgars 8/9  Placenta with 3vc; meconium stained with minimal fluid noted.   Lacerations: partial 3rd repaired with 2-0 and 3-0 vicryl  Mother and infant stable.    Archana Ellington M.D.         Sriram Saeed [[id number]]    Labor Event Times    Labor onset date: 21 Onset time:  2:30 AM   Dilation complete date: 21 Complete time:  9:00 AM   Start pushing date/time: 2021 1144      Labor Length    1st Stage (hrs): 6 (min): 30   2nd Stage (hrs): 2 (min): 54   3rd Stage (hrs): 0 (min): 4      Labor Events     labor?: No   steroids: None  Labor Type: Induction/Cervical ripening  Predominate monitoring during 1st stage: continuous electronic fetal monitoring     Antibiotics received during labor?: No     Rupture date/time: 21 0130   Rupture type: Spontaneous rupture of membranes occuring during spontaneous labor or augmentation     Induction: Misoprostol  Induction date/time:     Cervical ripening date/time: 3/31/21 0820   Indications for induction: Post-term Gestation     Augmentation: Oxytocin  Indications for augmentation: Ineffective Contraction Pattern  1:1 continuous labor support provided by?: RN Labor partogram used?: no      Delivery/Placenta Date and Time    Delivery Date: 21 Delivery Time: 11:54 AM   Placenta Date/Time: 2021 11:59 AM  Oxytocin given at the time of delivery: after delivery of baby  Delivering clinician: Archana Ellington MD   Other personnel present at delivery:  Provider Role   Deepika Johnston,  "RN Delivery Nurse   Alyssa Velasquez, RN Charge Nurse   Archana Ellington MD Obstetrician   Jaja Khan CNP Nurse Practitioner         Vaginal Counts     Initial count performed by 2 team members:  Two Team Members   LETICIA Rushing       Needles Suture Needles Sponges (RETIRED) Instruments   Initial counts 2  5    Added to count  2     Relief counts       Final counts 2 2 5          Placed during labor Accounted for at the end of labor   FSE No    IUPC No    Cervadil No               Final count performed by 2 team members:  Two Team Members   Jaspreet Ellington      Final count correct?: Yes     Apgars    Living status: Living   1 Minute 5 Minute 10 Minute 15 Minute 20 Minute   Skin color: 0  1       Heart rate: 2  2       Reflex irritability: 2  2       Muscle tone: 2  2       Respiratory effort: 2  2       Total: 8  9       Apgars assigned by: ALISON GUZMÁN     Cord    Vessels: 3 Vessels    Cord Complications: None               Cord Blood Disposition: Lab    Gases Sent?: No    Delayed cord clamping?: No    Stem cell collection?: No       [place] Resuscitation    Methods: Suctioning  [place] Care at Delivery: Terminal meconium at delivery.  Output in Delivery Room: Voided, Stool      Measurements    Weight: 8 lb 2 oz Length: 1' 9\"   Head circumference: 35.6 cm    Output in delivery room: Voided, Stool     Skin to Skin and Feeding Plan    Skin to skin initiation date/time: 1841    Skin to skin with: Mother  Skin to skin end date/time: 1841    Breastfeeding initiated date/time: 2021 1204     Labor Events and Shoulder Dystocia    Fetal Tracing Prior to Delivery: Category 2  Fetal Tracing Comments: variable decelerations  Shoulder dystocia present?: Neg     Delivery (Maternal) (Provider to Complete) ([id number])    Episiotomy: None  Perineal lacerations: 3rd Repaired?: Yes   Repair suture: 2-0 Vicryl, 3-0 Vicryl  Genital tract inspection done: Pos     Blood Loss  Mother: Christophe, " Rosibel KHALIL #[id number]   Start of Mother's Information    IO Blood Loss  04/01/21 0230 - 04/02/21 1154    Delivery QBL (mL) Hospital Encounter 75 mL    Postpartum QBL (mL) Hospital Encounter 25 mL    Total  100 mL         End of Mother's Information  Mother: Rosibel Saeed #[id number]          Delivery - Provider to Complete ([id number])    Delivering clinician: Archana Ellington MD  Attempted Delivery Types (Choose all that apply): Spontaneous Vaginal Delivery  Delivery Type (Choose the 1 that will go to the Birth History): Vaginal, Spontaneous                   Other personnel:  Provider Role   Deepika Johnston, RN Delivery Nurse   Alyssa Velasquez RN Charge Nurse   Archana Ellington MD Obstetrician   Jaja Khan, CNP Nurse Practitioner                Placenta    Date/Time: 4/1/2021 11:59 AM  Removal: Spontaneous  Comments: meconium stained  Disposition: Hospital disposal           Anesthesia    Method: Epidural  Cervical dilation at placement: 4-7                Presentation and Position    Presentation: Vertex    Position: Middle Occiput Anterior                 Archana Ellington MD

## 2021-04-01 NOTE — ANESTHESIA POSTPROCEDURE EVALUATION
Patient: Rosibel Saeed    * No procedures listed *    Diagnosis:* No pre-op diagnosis entered *  Diagnosis Additional Information: No value filed.    Anesthesia Type:  Epidural    Note:  Disposition: Inpatient   Postop Pain Control: Uneventful            Sign Out: Well controlled pain   PONV: No   Neuro/Psych: Uneventful            Sign Out: Acceptable/Baseline neuro status   Airway/Respiratory: Uneventful            Sign Out: Acceptable/Baseline resp. status   CV/Hemodynamics: Uneventful            Sign Out: Acceptable CV status   Other NRE: NONE   DID A NON-ROUTINE EVENT OCCUR? No         Last vitals:  Vitals:    04/01/21 1300 04/01/21 1315 04/01/21 1400   BP: 123/72 120/68 122/73   Pulse:      Resp:      Temp:      SpO2:          Last vitals prior to Anesthesia Care Transfer:      Electronically Signed By: JACINDA Dumont CRNA  April 1, 2021  2:56 PM

## 2021-04-01 NOTE — PROGRESS NOTES
Verified that the provider has discussed with Rosibel Saeed, the following; indications; the agents and methods of labor induction or augmentation, including risks, benefits, and alternative approached; and the possible need for repeat induction or  birth. Questions and concerns were addressed and patient agrees to above. Instructed regarding continuous fetal monitoring and BRP's prn if FHR/uterine activity stable.

## 2021-04-01 NOTE — PROGRESS NOTES
Up to BR for pericare. Pt was not able to void. Mother and baby transferred to postpartum unit at 1612 via ambulatory and bassinet after completion of immediate recovery period. Patient oriented to room and instructed to call for assistance when up to the bathroom the next time. Mother and baby bonding well and in stable condition upon transfer.

## 2021-04-01 NOTE — ANESTHESIA PROCEDURE NOTES
Epidural catheter Procedure Note  Pre-Procedure   Staff -        CRNA: Mary Liz APRN CRNA       Performed By: CRNA       Location: OB       Procedure Start/Stop Times: 4/1/2021 3:48 AM and 4/1/2021 4:22 AM       Pre-Anesthestic Checklist: patient identified, IV checked, risks and benefits discussed, informed consent, monitors and equipment checked, pre-op evaluation and at physician/surgeon's request  Timeout:       Correct Patient: Yes        Correct Procedure: Yes        Correct Site: Yes        Correct Position: Yes   Procedure Documentation  Procedure: epidural catheter       Patient Position: sitting       Patient Prep/Sterile Barriers: sterile gloves, mask, patient draped       Skin prep: DuraPrep       Local skin infiltrated with 5 mL of 1% lidocaine.        Insertion Site: L3-4. (midline approach).       Technique: LORT saline        Needle Type: [place]oy needle       Needle Gauge: 17.        Needle Length (Inches): 3.5        Catheter: 19 G.         Catheter threaded easily.        # of attempts: 1 and  # of redirects:     Assessment/Narrative         Paresthesias: No.      Test dose of 5 mL at 04:07.         Test dose negative, 3 minutes after injection, for signs of intravascular, subdural, or intrathecal injection.       Insertion/Infusion Method: LORT saline       Aspiration negative for Heme or CSF via Epidural Catheter.       Sensory Level Left: T10.       Sensory Level Right: T10.    Comments:  VAS pain score prior to epidural:10    VAS pain score after epidural:3    Pt. Tolerated well, FHR stable.

## 2021-04-01 NOTE — ANESTHESIA PREPROCEDURE EVALUATION
Anesthesia Pre-Procedure Evaluation    Patient: Rosibel Saeed   MRN: [id number] : 1995        Preoperative Diagnosis: * No surgery found *   Procedure :      Past Medical History:   Diagnosis Date     Chickenpox       Past Surgical History:   Procedure Laterality Date     ADENOIDECTOMY       LAPAROSCOPIC APPENDECTOMY       MOUTH SURGERY        Allergies   Allergen Reactions     Amoxicillin Rash      Social History     Tobacco Use     Smoking status: Never Smoker     Smokeless tobacco: Never Used   Substance Use Topics     Alcohol use: Not Currently     Comment: occas-quit with pregnancy      Wt Readings from Last 1 Encounters:   21 95.8 kg (211 lb 1.6 oz)        Anesthesia Evaluation   Pt has had prior anesthetic. Type: General.        ROS/MED HX  ENT/Pulmonary:  - neg pulmonary ROS     Neurologic:  - neg neurologic ROS     Cardiovascular:  - neg cardiovascular ROS     METS/Exercise Tolerance:     Hematologic:  - neg hematologic  ROS     Musculoskeletal:       GI/Hepatic:  - neg GI/hepatic ROS     Renal/Genitourinary:       Endo:  - neg endo ROS     Psychiatric/Substance Use:  - neg psychiatric ROS     Infectious Disease:       Malignancy:       Other:            Physical Exam    Airway        Mallampati: II   TM distance: > 3 FB   Neck ROM: full   Mouth opening: > 3 cm    Respiratory Devices and Support         Dental  no notable dental history         Cardiovascular   cardiovascular exam normal          Pulmonary   pulmonary exam normal                OUTSIDE LABS:  CBC:   Lab Results   Component Value Date    WBC 12.4 (H) 2021    WBC 13.4 (H) 2020    HGB 11.9 2021    HGB 12.6 2020    HCT 36.6 2021    HCT 37.3 2020     2021     2020     BMP: No results found for: NA, POTASSIUM, CHLORIDE, CO2, BUN, CR, GLC  COAGS: No results found for: PTT, INR, FIBR  POC: No results found for: BGM, HCG, HCGS  HEPATIC: No results found for: ALBUMIN,  PROTTOTAL, ALT, AST, GGT, ALKPHOS, BILITOTAL, BILIDIRECT, JANEEN  OTHER: No results found for: PH, LACT, A1C, ISSAC, PHOS, MAG, LIPASE, AMYLASE, TSH, T4, T3, CRP, SED    Anesthesia Plan    ASA Status:  2      Anesthesia Type: Epidural.              Consents    Anesthesia Plan(s) and associated risks, benefits, and realistic alternatives discussed. Questions answered and patient/representative(s) expressed understanding.     - Discussed with:  Patient         Postoperative Care            Comments:           neg OB ROS.       JACINDA Foreman CRNA

## 2021-04-01 NOTE — PROGRESS NOTES
S:Delivery  B:Ripening and induction Labor,41w0d    Lab Results   Component Value Date    GBS Negative 2021    with antibiotic treatment not indicated 4 hours prior to delivery.  A: Patient delivered   lac 3rd degree at 1154 with Dr. JACINDA Ellington in attendance and baby placed on mother's abdomen for immediate cord clamping. Baby to warmer for drying and wrapped in blanket.. Placed skin to skin @ 1158.. Apgars 8/9.Delivery QBL 70.  IV infusion of Oxytocin  infused. Placenta removal spontaneous. MD does not want placenta sent to pathology.  See Flowsheet for VS and PP checks. Delivery QBL (mL): 75 mL.  Labor care plan goals met, transition now to postpartum care.   R: Expect routine postpartum care. Anticipate first feeding within the hour or whenever infant displays feeding cues. Continue skin to skin. Prior discussion with mother indicates that feeding plan is Breast feeding . Educated mother on importance of exclusive breastfeeding, expected feeding readiness cues and encouraged her to observe for these cues while rooming in. Informed her that breastfeeding assistance would be provided.

## 2021-04-01 NOTE — PROGRESS NOTES
SVE 10/100/+1, MD updated. Patient turned every 30 minutes and remains comfortable. 500ml fluid flush for slight fetal tachycardia.

## 2021-04-01 NOTE — PROGRESS NOTES
"[facility] OB/GYN Department    Labor Note    Date of Admission: 3/31/2021  Name: Rosibel Saeed    Subjective:    Patient resting comfortably with epidural in place.     Objective:    Vital signs:  Temp: 98.5  F (36.9  C) Temp src: Oral BP: 130/88 Pulse: 89   Resp: 18 SpO2: 97 %     Height: 157.5 cm (5' 2\") Weight: 95.8 kg (211 lb 1.6 oz)  Estimated body mass index is 38.61 kg/m  as calculated from the following:    Height as of this encounter: 1.575 m (5' 2\").    Weight as of this encounter: 95.8 kg (211 lb 1.6 oz).          FHR:  140s baseline, moderate variability, + accelerations, no decelerations  Uterine Contractions:  q3-4min  Sterile Vaginal Exam:  7/90/-1, AROM attempted, membranes right on fetal head, no bugling bag, only return of bloody show    Assessment and Plan:    Patient has been making spontaneous cervical change, will add Pitocin if needed.  AROM attempted but no bulging bag, question success. Will monitor for output.      Luisana Caba, DO    "

## 2021-04-02 PROCEDURE — 120N000001 HC R&B MED SURG/OB

## 2021-04-02 PROCEDURE — 250N000013 HC RX MED GY IP 250 OP 250 PS 637: Performed by: OBSTETRICS & GYNECOLOGY

## 2021-04-02 RX ADMIN — DOCUSATE SODIUM AND SENNOSIDES 1 TABLET: 8.6; 5 TABLET ORAL at 08:10

## 2021-04-02 RX ADMIN — IBUPROFEN 800 MG: 800 TABLET ORAL at 08:10

## 2021-04-02 RX ADMIN — PRENATAL VITAMINS-IRON FUMARATE 27 MG IRON-FOLIC ACID 0.8 MG TABLET 1 TABLET: at 08:10

## 2021-04-02 RX ADMIN — DOCUSATE SODIUM AND SENNOSIDES 1 TABLET: 8.6; 5 TABLET ORAL at 21:21

## 2021-04-02 RX ADMIN — IBUPROFEN 800 MG: 800 TABLET ORAL at 21:22

## 2021-04-02 RX ADMIN — IBUPROFEN 800 MG: 800 TABLET ORAL at 01:54

## 2021-04-02 RX ADMIN — IBUPROFEN 800 MG: 800 TABLET ORAL at 15:31

## 2021-04-02 NOTE — PLAN OF CARE
Data: Vital signs within normal limits. Postpartum checks within normal limits - see flow record. Patient  Is tolerating po intake. Patient is able to empty bladder independently. . Patient ambulating independently..   No apparent signs of infection. Lac healing well. Patient Is performing self cares and Is able to care for infant. Positive attachment behaviors are observed with infant. Support persons are present.  Action:  Pain plan was discussed. Patient will request pain med when she is ready for it. Patient was medicated during the shift for cramping. See MAR.Patient education done about hand hygiene, breastfeeding,  cares, postpartum cares, and  safety. See flow record.  Response:   Patient reassessed within 1 hour after each medication for pain. Patient stated that pain had improved. Patient stated that she was comfortable. .   Plan: Anticipate discharge on 4/3/21.

## 2021-04-02 NOTE — PLAN OF CARE
Data: Vital signs within normal limits. Postpartum checks within normal limits - see flow record. Patient  is tolerating po intake. Patient is able to empty bladder independently. . Patient ambulating independently..   No apparent signs of infection. Lac 3rd degree healing well. Patient is performing self cares and is able to care for infant. Positive attachment behaviors are observed with infant. Support persons are present.  Action:  Pain plan was discussed. Patient will request pain med when she is ready for it. Patient was medicated during the shift for cramping. See MAR.Patient education done about breastfeeding,  cares, postpartum cares, pain management/plan, fall risk,  safety, and rest. See flow record.  Response:   Patient reassessed within 1 hour after each medication for pain. Patient stated that pain had improved. Patient stated that she was comfortable. .   Plan: Anticipate discharge on .    [No Acute Distress] : no acute distress [Well Nourished] : well nourished [Supple] : supple [Well Developed] : well developed [No Lymphadenopathy] : no lymphadenopathy [No Respiratory Distress] : no respiratory distress  [No Accessory Muscle Use] : no accessory muscle use [Clear to Auscultation] : lungs were clear to auscultation bilaterally [Normal Rate] : normal rate  [Regular Rhythm] : with a regular rhythm [Normal S1, S2] : normal S1 and S2 [Pedal Pulses Present] : the pedal pulses are present [Soft] : abdomen soft [Non Tender] : non-tender [Non-distended] : non-distended [Normal Bowel Sounds] : normal bowel sounds [Normal Anterior Cervical Nodes] : no anterior cervical lymphadenopathy [Normal Posterior Cervical Nodes] : no posterior cervical lymphadenopathy [Coordination Grossly Intact] : coordination grossly intact [No Focal Deficits] : no focal deficits [Normal Gait] : normal gait [Normal Affect] : the affect was normal [Alert and Oriented x3] : oriented to person, place, and time [Normal Insight/Judgement] : insight and judgment were intact [de-identified] : obese

## 2021-04-02 NOTE — PLAN OF CARE
Problem: Adult Inpatient Plan of Care  Goal: Plan of Care Review  Outcome: Improving     Data: Vital signs within normal limits. Postpartum checks within normal limits - see flow record. Patient  Is tolerating po intake. Patient is able to empty bladder independently. Patient independent with self and baby cares.   Positive attachment behaviors are observed with infant. Support persons are present.  Action:  Pain plan was discussed. Patient would like pain meds to be brought in when they are due. Patient was medicated during the shift for cramping. See MAR.   Response:   Patient reassessed within 1 hour after each medication for pain. Patient stated that pain had improved. Patient stated that she was comfortable. .   Plan: Anticipate discharge on 4/3/21.

## 2021-04-02 NOTE — PLAN OF CARE
Data: Vital signs within normal limits. Postpartum checks within normal limits - see flow record. Patient eating and drinking normally. Patient able to empty bladder independently and is up ambulating. No apparent signs of infection. Incision healing well. Patient performing self cares and is able to care for infant.  Action: Patient medicated during the shift for pain. See MAR. Patient reassessed within 1 hour after each medication and pain was improved - patient stated she was comfortable. Patient education done about self and nb cares. See flow record.  Response: Positive attachment behaviors observed with infant. Support persons are present.   Plan: Anticipate discharge on 4/3/21.

## 2021-04-02 NOTE — PROGRESS NOTES
[facility] OB/GYN Daily Postpartum Note    S: Ms. Saeed is feeling well this morning. She denies any complaints. Her pain is well-controlled on oral pain medications. She tolerating a regular diet without nausea or vomiting. Ambulating without difficulty. Passing flatus, no BM. Lochia is decreasing. Breastfeeding without questions or concerns.    O:   VS:   Patient Vitals for the past 24 hrs:   BP Temp Temp src Pulse Resp SpO2   21 0810 111/70 97.7  F (36.5  C) Oral 80 16 --   21 2310 91/60 97.3  F (36.3  C) Oral 70 16 --   21 1604 112/65 97.5  F (36.4  C) Oral 94 18 96 %     General: resting in bed, in NAD  CV: Reg rate, warm and well perfused  Resp: breathing comfortably on room air   Abdomen: soft, appropriately tender, nondistended  Fundus firm well below the umbilicus  Extremities: non-tender, non-edematous     Recent Labs   Lab 21  0809   HGB 11.9       A/P: P1, PPD#1 s/p   Routine PP care  Anticipate discharge tomorrow for further breast feeding support.     Usha Engle MD, MD  [facility] OB/GYN   2021 2:51 PM

## 2021-04-03 VITALS
DIASTOLIC BLOOD PRESSURE: 70 MMHG | SYSTOLIC BLOOD PRESSURE: 114 MMHG | TEMPERATURE: 98 F | WEIGHT: 211.1 LBS | RESPIRATION RATE: 18 BRPM | HEIGHT: 62 IN | HEART RATE: 76 BPM | BODY MASS INDEX: 38.85 KG/M2 | OXYGEN SATURATION: 96 %

## 2021-04-03 PROCEDURE — 250N000013 HC RX MED GY IP 250 OP 250 PS 637: Performed by: OBSTETRICS & GYNECOLOGY

## 2021-04-03 RX ADMIN — DOCUSATE SODIUM AND SENNOSIDES 1 TABLET: 8.6; 5 TABLET ORAL at 08:09

## 2021-04-03 RX ADMIN — IBUPROFEN 800 MG: 800 TABLET ORAL at 08:10

## 2021-04-03 RX ADMIN — PRENATAL VITAMINS-IRON FUMARATE 27 MG IRON-FOLIC ACID 0.8 MG TABLET 1 TABLET: at 08:09

## 2021-04-03 NOTE — PROGRESS NOTES
[facility] Obstetrics Post-Partum Progress Note          Assessment and Plan:    Assessment:   Post-partum day #2  Normal spontaneous vaginal delivery  L&D complications: None      Doing well.  No excessive bleeding  Pain well-controlled.      Plan:   Discharge today           Interval History:   Doing well.  Pain is well-controlled.  No fevers.  No history of foul-smelling vaginal discharge.  Good appetite.  Denies chest pain, shortness of breath, nausea or vomiting.  Vaginal bleeding is similar to a heavy menstrual flow.  Ambulatory.  Breastfeeding well.          Significant Problems:    Active Problems:    Prenatal care, first pregnancy    Post term pregnancy    Lattice degeneration            Review of Systems:    The patient denies any chest pain, shortness of breath, excessive pain, fever, chills, purulent drainage from the wound, nausea or vomiting.          Medications:   All medications related to the patient's surgery have been reviewed          Physical Exam:     All vitals stable  Patient Vitals for the past 12 hrs:   BP Temp Temp src Pulse Resp   04/03/21 0337 114/70 98  F (36.7  C) Oral 76 18     Uterine fundus is firm, non-tender and at the level of the umbilicus          Data:     All laboratory data related to this surgery reviewed  Hemoglobin   Date Value Ref Range Status   03/31/2021 11.9 11.7 - 15.7 g/dL Final   12/14/2020 12.6 11.7 - 15.7 g/dL Final   10/19/2020 14.1 11.7 - 15.7 g/dL Final     No imaging studies have been ordered    Lynne Shah MD

## 2021-04-03 NOTE — DISCHARGE SUMMARY
[facility] Discharge Summary    Rosibel Saeed MRN# [id number]   Age: 26 year old YOB: 1995     Date of Admission:  3/31/2021  Date of Discharge::  4/3/2021  Admitting Physician:  Luisana Caba DO  Discharge Physician:  Lynne Shah MD     Home clinic: [facility]          Admission Diagnoses:   Post term pregnancy [O48.0]          Discharge Diagnosis:   Normal spontaneous vaginal delivery  Intrauterine pregnancy at 41 weeks gestation          Procedures:   Procedure(s): No additional procedures performed       No other procedures performed during this admission           Medications Prior to Admission:     Medications Prior to Admission   Medication Sig Dispense Refill Last Dose     cetirizine (ZYRTEC) 10 MG tablet Take 10 mg by mouth daily   3/30/2021 at 0700     Prenatal Vit-Fe Fumarate-FA (PRENATAL MULTIVITAMIN W/IRON) 27-0.8 MG tablet Take 1 tablet by mouth daily   3/31/2021 at 0600 time             Discharge Medications:     Current Discharge Medication List      CONTINUE these medications which have NOT CHANGED    Details   cetirizine (ZYRTEC) 10 MG tablet Take 10 mg by mouth daily      Prenatal Vit-Fe Fumarate-FA (PRENATAL MULTIVITAMIN W/IRON) 27-0.8 MG tablet Take 1 tablet by mouth daily                   Consultations:   No consultations were requested during this admission          Brief History of Labor:   26 year old  presented @ 41w0d to [facility] for induction of labor due to post-term pregnancy.      Stage 1: Initial cervical exam 0-1/50/-2/posterior.  Received misoprostol cervical ripening followed by pitocin.  Normal labor progress.  Epidural for analgesia.   Stage 2: labored down x 2 hrs.  Pushed x 10 min.      of viable male, 8#2, Apgars 8/9  Placenta with 3vc; meconium stained with minimal fluid noted.   Lacerations: partial 3rd repaired with 2-0 and 3-0 vicryl  Mother and infant stable.     Archana Ellington M.D.              Hospital Course:   The patient's hospital course was unremarkable.  On discharge, her pain was well controlled. Vaginal bleeding is similar to peak menstrual flow.  Voiding without difficulty.  Ambulating well and tolerating a normal diet.  No fever.  Breastfeeding well.  Infant is stable.  No bowel movement yet.*  She was discharged on post-partum day #2.    Post-partum hemoglobin:   Hemoglobin   Date Value Ref Range Status   03/31/2021 11.9 11.7 - 15.7 g/dL Final             Discharge Instructions and Follow-Up:   Discharge diet:    Discharge activity: Pelvic rest: abstain from intercourse and do not use tampons for 6 week(s)   Discharge follow-up: Follow up with OB in 6 weeks   Wound care: Drink plenty of fluids           Discharge Disposition:   Discharged to home      Attestation:  I have reviewed today's vital signs, notes, medications, labs and imaging.    Lynne Shah MD

## 2021-04-03 NOTE — PLAN OF CARE
This writer assumes care of pt at 1915 hours.  Report received from Halle SPARROW RN.  Pt assessment wnl, vss.  Pt denies pain only mild soreness.  Pt declines need for additional pain medication but accepts prn Ibuprofen at appx 2100 hours.   Pt up ad aakash in room and in dial.  Plan: Continue to monitor and assess, medicate prn.

## 2021-04-03 NOTE — DISCHARGE INSTRUCTIONS
Postpartum Vaginal Delivery Instructions    MAKE AN APPOINTMENT IN 6 WEEKS TO FOLLOW UP WITH YOUR DOCTOR, SOONER IF PHYSICAL OR EMOTIONAL CONCERNS.  Storing Expressed Milk    You can express your milk and store it in clean containers. Your family or a sitter can feed it to the baby. This way, your baby gets the benefits of your milk even when you can't be there at feeding time.  Type of storage Storage times   Room temperature       At room temperature (up to 78 F or 26 C)    Tip: Keep the container clean, covered, and cool. 3 to 4 hours is best; 6 to 8 hours is acceptable under very clean conditions   Refrigerator       In a refrigerator (less than 39 F or less than 4 C)    Tip: Place milk in the back of the main section of the refrigerator. 72 hours is best; up to 8 days is acceptable under very clean conditions   Freezer        In a freezer (0 F or -17 C)    Tip: Store milk toward the back of the freezer. 6 months is best; 12 months is acceptable   Guidelines for milk storage  Always use a clean container to collect and store milk. Never pour warm expressed milk into a bottle with cold milk. And be sure to label and date each bottle or bag of milk. To store milk safely, see the chart above.  Warming stored milk  Thaw frozen milk in the refrigerator or in a bowl of warm water. It s a good idea to warm refrigerated milk before using it. For your baby s safety:    Use the oldest milk first.    Warm a container of milk by putting it in a bowl of warm (not hot) water for a few minutes. Or use a bottle warmer set on low.    Gently swirl the milk to mix it. Then place a few drops on your wrist. The milk should be near room temperature.    Don t put the milk in a microwave. This could create pockets of hot liquid that can burn your baby s mouth.  [email or web address] last reviewed this educational content on 7/25/2018 2000-2020 [employer]. All rights reserved. This information is not intended as a substitute for  professional medical care. Always follow your healthcare professional's instructions.        Breastfeeding FAQs  How often should I nurse?  Feed your  whenever he or she show signs of hunger. A general guideline is to nurse around 8 to 12 times every 24 hours, or about every 2 to 3 hours. It's important to wake newborns to nurse at least every 3 hours until they are back to their birth weight. Once newborns have shown that they will demand enough milk to grow adequately, they will not need to be awakened to eat, and will often space out feedings as their hunger allows them. With time and patience, every mother-baby pair will develop their own schedule and feeding pattern.   How many months should I nurse?  The American College of Obstetricians and Gynecologists (ACOG) and the American Academy of Pediatrics (AAP) both recommend that mothers start breastfeeding as soon as possible after birth, ideally within the first hour. Research has shown that time skin-to-skin after delivery helps to encourage this. Both organizations encourage  breastfeeding-only  with no other supplements for healthy newborns for the first 6 months of life. At 6 months, your baby may slowly start having solid foods as well. But continue breastfeeding at least through the baby s first birthday. After the first birthday, you and your baby can stop or continue breastfeeding as long as you want it to happen.   Is my baby getting enough milk?  There are a few ways to check if your baby is getting enough milk.  Latching on  You know your baby is getting milk if you hear gulping and swallowing sounds, not just sucking. Look for your baby steadily moving his or her jaw open and closed as another sign of proper  latching on.  If the latch is painful, it's important to ask for help from a lactation consultant or your healthcare provider. It could be a sign that your baby is not transferring milk from your breast well. Most of the time, this issue  is common and can be easily managed with a little help.   Urine output and stool frequency  You can also tell how much milk your baby is getting by keeping track of your baby s diapers. By the end of the first week of life:     Your baby should have about 1 wet diaper on day 1, and 2 wet diapers on day 2. This should increase each day by 1 more wet diaper, up to 6 wet diapers on day 6 and then about 6 wet diapers every day. The urine will be a pale yellow color, not dark yellow or orange. Wet diapers should stay around this amount as your  baby gets older.    Your baby should have 3 or 4 stools per day that are at least a teaspoon in amount (not just smears). By the 4th day of life, the black-brown meconium early stool should start to transition to a greenish color, then within another day or two to a loose yellow stool with curds. This is a normal  stool. It's not uncommon for a  baby to pass stool after each feeding. In the second to fourth week of life, the number of stools can increase to about 5 per day. After 1 month, the number of stools usually lessens. It might be 1 or 2 a day. Some babies may have a day or days with no stool. In these cases, stool should be in larger amounts when passed.  Weight  It s normal for your baby to lose some weight during the first 3 to 4 days of life. A baby might normally lose up to 7% to 10% of his or her birth weight during this time. Then your baby should start gaining again. By the end of the second week, he or she should be back to birth weight. In the weeks following this, he or she will gain about a half ounce to an ounce per day.   Does my baby need vitamins?  All  infants should take 400 international units (IU) a day of vitamin D (infant formulation). Your healthcare provider may prescribe this or it may be purchased over the counter.     When your baby is 6 months old, you may start to offer baby foods that contain iron. You should  discuss the possible need for iron supplementation with your infant's healthcare provider.   What should I do if my breasts become swollen, tender, or sore?  These symptoms are most often because your breasts are too full of milk (engorged). This is common when your milk first comes in or if you are making more milk than your baby is drinking. This may cause swelling and make it harder for your baby to nurse. If this happens, try the following:     Keep nursing. This is a temporary condition. It gets better once you can get your baby to drink more milk. Be sure to breastfeed more often and let your baby feed until he or she is finished. Be sure to massage your breast while feeding to help the breast drain as fully as possible.    Express some milk before you breastfeed. Do this manually or with a breast pump. This will soften the darker area around the nipple (areola) so your baby can latch deeper to begin feeding.    Use a warm compress. This can be a towel or paper diaper soaked in warm water. Or take a warm shower before feeding. Some women have found that switching off between a cold compress and a warm one gives them relief. If you feel comfortable with this, you can try it too. If you use an ice pack, wrap it in a thin towel to protect your skin.    Take acetaminophen or ibuprofen for continued pain. The medicine is safe to take during breastfeeding.  If your nipples or breasts continue to hurt, call your healthcare provider. Nipple and breast problems are urgent and need to be looked at and treated as soon as possible.   When to seek medical advice  Unless your baby s healthcare provider advises otherwise, call the provider right away if your baby has any of the following:     Fever (see Fever and children, below)    Repeated vomiting    Does not appear to be alert, refuses to nurse, or is sleeping too much, such as through feedings    Has signs of dehydration. These include fewer wet diapers than normal or  no urine for 8 hours, or the urine appears dark. Or your baby has no tears when crying,  sunken eyes,  or dry mouth.    Is not gaining weight or losing weight  Call your own healthcare provider right away if you:     Have a fever of 100.4 F (38 C) or higher, or as directed by your provider    Have redness, warmth, pain, or unusual discharge from your breasts    Have such painful nipples and breasts that you want to stop nursing    Have a hard lump in your breast    Have lower belly (abdominal) pain or cramping when you are not breastfeeding    Have pain or burning when you pass urine    Have unexpected vaginal bleeding or foul-smelling discharge  Fever and children  Use a digital thermometer to check your child s temperature. Don t use a mercury thermometer. There are different kinds and uses of digital thermometers. They include:     Rectal. For children younger than 3 years, a rectal temperature is the most accurate.    Forehead (temporal). This works for children age 3 months and older. If a child under 3 months old has signs of illness, this can be used for a first pass. The provider may want to confirm with a rectal temperature.    Ear (tympanic). Ear temperatures are accurate after 6 months of age, but not before.    Armpit (axillary). This is the least reliable but may be used for a first pass to check a child of any age with signs of illness. The provider may want to confirm with a rectal temperature.    Mouth (oral). Don t use a thermometer in your child s mouth until he or she is at least 4 years old.  Use the rectal thermometer with care. Follow the product maker s directions for correct use. Insert it gently. Label it and make sure it s not used in the mouth. It may pass on germs from the stool. If you don t feel OK using a rectal thermometer, ask the healthcare provider what type to use instead. When you talk with any healthcare provider about your child s fever, tell him or her which type you used.    Below are guidelines to know if your young child has a fever. Your child s healthcare provider may give you different numbers for your child. Follow your provider s specific instructions.   Fever readings for a baby under 3 months old:     First, ask your child s healthcare provider how you should take the temperature.    Rectal or forehead: 100.4 F (38 C) or higher    Armpit: 99 F (37.2 C) or higher  Fever readings for a child age 3 months to 36 months (3 years):     Rectal, forehead, or ear: 102 F (38.9 C) or higher    Armpit: 101 F (38.3 C) or higher  Call the healthcare provider in these cases:     Repeated temperature of 104 F (40 C) or higher in a child of any age    Fever of 100.4  F (38  C) or higher in baby younger than 3 months    Fever that lasts more than 24 hours in a child under age 2    Fever that lasts for 3 days in a child age 2 or older  [employer] last reviewed this educational content on 4/1/2020 2000-2020 [employer]. All rights reserved. This information is not intended as a substitute for professional medical care. Always follow your healthcare professional's instructions.        Common Questions About Breastfeeding    Here are answers to some questions new mothers often ask.  Is my baby getting enough milk?  When it comes to feeding your baby, what goes in must come out. You can tell how much milk your baby is getting by keeping track of the baby s diapers:    By the first 24 hours after birth: The baby should have 1 to 2 wet diapers and 1 to 2 soiled (poopy) diapers. The poop will be dark and tar-like (meconium).    The second and third day after birth: The baby should have 3 to 4 wet diapers and 2 to 3 soiled diapers. The poop will be greenish brown (transitional stool).    After the first 4 or 5 days: The baby should have at least 5 to 6 wet diapers and at least 3 to 4 soiled diapers a day. The poop will be yellow and loose.  How can I tell when my baby s hungry?  Don t  wait until your baby cries to feed him or her. Newborns should be nursed as soon as they show any hunger signs. These include:    Increased alertness or activity    Rooting reflex (nuzzling against your breast)    Smacking lips or opening and closing the mouth    Sucking on the hand or fingers    Crying (late sign)  How often should I feed my baby?  Feed your baby as often and as long as he or she wants. Make sure you re nursing at least 8 to 12 times per day. Some of these feedings might be close together (cluster feeding), and then your baby might rest for several hours. Let your baby nurse as long as he or she would like; when done, he or she will stop swallowing, relax his or her hands and fall asleep. If your baby hasn't nursed in 4 hours, you may need to wake your baby and offer your milk. Newborns tend to be very sleepy and sometimes will not wake to eat. If your baby doesn't seem interested in nursing, place him or her in just diapers against your bare skin (skin to skin) and continue to offer your milk. And, if your baby fusses when feeding, don't worry. Some babies get distracted easily. To calm your baby, choose a quiet place for feeding. It may also help if you breastfeed in the same place in your home each time. If your baby is crying, it may be difficult for him or her to latch on. Gently place your finger in the mouth to help him or her feel calm, and then offer your milk again.  Will I spoil my baby?  Newborns can't be spoiled. When your baby needs comfort, food, or holding, his or her crying will let you know. When you respond to your baby's needs, you help him or her learn to trust you. This is a time to shower your baby with love and attend to his or her needs.  Why is my baby so hungry?  Babies eat a lot. Their stomachs are very small when they are born, and mother's milk is easily and quickly digested. This is even truer during a growth spurt. Growth spurts usually happen around 2 and 6 weeks of  "age. They happen again at 3 and 6 months. During these times, your baby will breastfeed more often. Don t be alarmed. Your baby will not need formula or supplements. You will make all the milk that your baby needs because milk production is a \"supply and demand\" situation (baby's demand will increase mom's supply).  [employer] last reviewed this educational content on 2/1/2018 2000-2020 [employer]. All rights reserved. This information is not intended as a substitute for professional medical care. Always follow your healthcare professional's instructions.          Activity       Ask family and friends for help when you need it.    Do not place anything in your vagina for 6 weeks.    You are not restricted on other activities, but take it easy for a few weeks to allow your body to recover from delivery.  You are able to do any activities you feel up to that point.    No driving until you have stopped taking your pain medications (usually two weeks after delivery).     Call your health care provider if you have any of these symptoms:       Increased pain, swelling, redness, or fluid around your stiches from an episiotomy or perineal tear.    A fever above 100.4 F (38 C) with or without chills when placing a thermometer under your tongue.    You soak a sanitary pad with blood within 1 hour, or you see blood clots larger than a golf ball.    Bleeding that lasts more than 6 weeks.    Vaginal discharge that smells bad.    Severe pain, cramping or tenderness in your lower belly area.    A need to urinate more frequently (use the toilet more often), more urgently (use the toilet very quickly), or it burns when you urinate.    Nausea and vomiting.    Redness, swelling or pain around a vein in your leg.    Problems breastfeeding or a red or painful area on your breast.    Chest pain and cough or are gasping for air.    Problems coping with sadness, anxiety, or depression.  If you have any concerns about " hurting yourself or the baby, call your provider immediately.     You have questions or concerns after you return home.     Keep your hands clean:  Always wash your hands before touching your perineal area and stitches.  This helps reduce your risk of infection.  If your hands aren't dirty, you may use an alcohol hand-rub to clean your hands. Keep your nails clean and short.

## 2021-04-03 NOTE — PLAN OF CARE
PP checks WNL. Pt is independent with self and  cares. Breastfeeding on demand.  here and supportive.

## 2021-04-15 ENCOUNTER — DOCUMENTATION ONLY (OUTPATIENT)
Dept: OTHER | Facility: CLINIC | Age: 26
End: 2021-04-15

## 2021-05-14 ENCOUNTER — PRENATAL OFFICE VISIT (OUTPATIENT)
Dept: OBGYN | Facility: CLINIC | Age: 26
End: 2021-05-14
Payer: COMMERCIAL

## 2021-05-14 VITALS
HEIGHT: 63 IN | RESPIRATION RATE: 18 BRPM | SYSTOLIC BLOOD PRESSURE: 113 MMHG | DIASTOLIC BLOOD PRESSURE: 79 MMHG | TEMPERATURE: 97.9 F | WEIGHT: 191 LBS | HEART RATE: 95 BPM | BODY MASS INDEX: 33.84 KG/M2

## 2021-05-14 DIAGNOSIS — Z30.011 ENCOUNTER FOR INITIAL PRESCRIPTION OF CONTRACEPTIVE PILLS: Primary | ICD-10-CM

## 2021-05-14 PROCEDURE — 99207 PR POST PARTUM EXAM: CPT | Performed by: OBSTETRICS & GYNECOLOGY

## 2021-05-14 RX ORDER — ACETAMINOPHEN AND CODEINE PHOSPHATE 120; 12 MG/5ML; MG/5ML
0.35 SOLUTION ORAL DAILY
Qty: 86 TABLET | Refills: 4 | Status: SHIPPED | OUTPATIENT
Start: 2021-05-14 | End: 2022-06-23

## 2021-05-14 ASSESSMENT — MIFFLIN-ST. JEOR: SCORE: 1577.56

## 2021-05-14 NOTE — NURSING NOTE
"Initial /79 (BP Location: Right arm, Patient Position: Chair, Cuff Size: Adult Regular)   Pulse 95   Temp 97.9  F (36.6  C) (Tympanic)   Resp 18   Ht 1.588 m (5' 2.5\")   Wt 86.6 kg (191 lb)   LMP 06/18/2020   Breastfeeding Yes   BMI 34.38 kg/m   Estimated body mass index is 34.38 kg/m  as calculated from the following:    Height as of this encounter: 1.588 m (5' 2.5\").    Weight as of this encounter: 86.6 kg (191 lb). .      "

## 2021-05-14 NOTE — PROGRESS NOTES
"[facility]    CC: Postpartum Visit    S: Pt doing well. She denies any complaints. Off any pain medications. Eating, drinking, urinating and moving bowels without any issues. Lochia has resolved. Breastfeeding without questions or concerns. Mood is fine.     O:   VS:   Patient Vitals for the past 24 hrs:   BP Temp Temp src Pulse Resp Height Weight   05/14/21 0906 113/79 97.9  F (36.6  C) Tympanic 95 18 1.588 m (5' 2.5\") 86.6 kg (191 lb)     General: NAD  CV: Regular rate, warm and well perfused  Resp: breathing comfortably on room air   Extremities: non-tender, non-edematous     A/P: 26 year old now P1, 6wks pp  Appropriate postpartum recovery.   Plan for POPs for contraception.     Plan for routine GYN cares, PAP smear due in one year      Usha Engle MD, MD  [facility]   5/14/2021 9:31 AM    "

## 2021-06-09 ENCOUNTER — OFFICE VISIT (OUTPATIENT)
Dept: FAMILY MEDICINE | Facility: CLINIC | Age: 26
End: 2021-06-09
Payer: COMMERCIAL

## 2021-06-09 ENCOUNTER — ANCILLARY PROCEDURE (OUTPATIENT)
Dept: GENERAL RADIOLOGY | Facility: CLINIC | Age: 26
End: 2021-06-09
Attending: FAMILY MEDICINE
Payer: COMMERCIAL

## 2021-06-09 VITALS
HEART RATE: 96 BPM | DIASTOLIC BLOOD PRESSURE: 80 MMHG | WEIGHT: 192 LBS | HEIGHT: 62 IN | BODY MASS INDEX: 35.33 KG/M2 | RESPIRATION RATE: 16 BRPM | SYSTOLIC BLOOD PRESSURE: 114 MMHG | TEMPERATURE: 98.9 F

## 2021-06-09 DIAGNOSIS — M79.672 LEFT FOOT PAIN: ICD-10-CM

## 2021-06-09 DIAGNOSIS — M79.672 LEFT FOOT PAIN: Primary | ICD-10-CM

## 2021-06-09 PROCEDURE — 73630 X-RAY EXAM OF FOOT: CPT | Mod: LT | Performed by: RADIOLOGY

## 2021-06-09 PROCEDURE — 99213 OFFICE O/P EST LOW 20 MIN: CPT | Performed by: FAMILY MEDICINE

## 2021-06-09 ASSESSMENT — MIFFLIN-ST. JEOR: SCORE: 1574.16

## 2021-06-09 NOTE — PROGRESS NOTES
"A: L foot pain, nos    P:  nsaids for a week, then prn.  Podiatry next step if not improving or worsening, but reassured by exam/xrray, normal gait today.        S: Rosibel Saeed is a 26 year old female with some swelling and mild pain top of L foot.  Not red, not pitting when pushing on it.  No other swelling in leg, ankle, other foot.  Breathing fine  Not ill    Has been on feet a lot last few weeks, new job, that's when she noticed     No trauma, injury, or fall.     O:/80   Pulse 96   Temp 98.9  F (37.2  C) (Tympanic)   Resp 16   Ht 1.575 m (5' 2\")   Wt 87.1 kg (192 lb)   LMP 06/18/2020   BMI 35.12 kg/m    GEN: Alert and oriented, in no acute distress  Has some mild swelling forefoot on L.  Non pitting, not hot, red.  No point tenderness.  Gait normal    Xray : independently visualized, normal       "

## 2021-06-12 NOTE — PROGRESS NOTES
"Please see \"Imaging\" tab under \"Chart Review\" for details of today's visit.    Hung Mcbride        "

## 2021-06-12 NOTE — PROGRESS NOTES
[facility]  Genetic Counseling Consult    Patient:  Rosibel Saeed YOB: 1995   Date of Service:  10/26/2020      Rosibel Saeed was seen at the [facility] for genetic consultation as part of her appointment for comprehensive ultrasound.  The indication for genetic counseling is family history of spina bifida.       Impression/Plan:   1. Rosibel has declined serum screening in this pregnancy.    2. Rosibel had a comprehensive (level II) ultrasound today.  Please see the ultrasound report for further details.    Pregnancy History:   /Parity:    Age at Delivery: 26 y.o.  HANY: 3/25/2021, by Last Menstrual Period  Gestational Age: 18w4d    No significant complications or exposures were reported in the current pregnancy.      Medical History:   Rosibel s reported medical history is not expected to impact pregnancy management or risks to fetal development.       Family History:   A three-generation pedigree was obtained, and is scanned under the  Media  tab.   The following significant findings were reported by Rosibel:  Rosibel reports that her mother had spina bifida.  Per report, it was diagnosed later in life and it was recommended that she have surgery, however she was not able to afford the surgery.  We discussed that the group of birth defects called open neural tube defects, which includes spina bifida, can be seen to run in families due to complex/multifactorial inheritance.  This means that there can be many things, including genetic risks, that contribute to an individual's probability for developing spina bifida, however, the more closely related an affected individual is, the more likely it is for a pregnancy to be similarly affected.  We discussed that folic acid supplementation in the preconception and early pregnancy would be recommended in any future pregnancies.  Today's ultrasound noted no  concerning findings for fetal spine.   Rosibel reports that his father and a pregnancy with a different partner than her mother that was still born due to an umbilical cord accident.  We discussed that this history is not expected to impact risks for her pregnancy.   Rosibel's partner David reports a maternal aunt with cognitive impairment of unknown origin.  This aunt passed away from a ALS in adulthood.  David reports that little information regarding his aunt's health was shared with family, and it is unknown if there is any additional information regarding his aunt's diagnoses and health history.  We discussed that it is difficult to assess how this history might impact risks for the ongoing pregnancy.      Otherwise, the reported family history is negative for multiple miscarriages, stillbirths, birth defects, cognitive impairment, known genetic conditions, and consanguinity.       Carrier Screening:       Expanded carrier screening for mutations in a large panel of genes associated with autosomal recessive conditions including cystic fibrosis, spinal muscular atrophy, and others, is now available.      The patient has declined a discussion of available carrier screening options today.       Risk Assessment for Chromosome Conditions:   We explained that the risk for fetal chromosome abnormalities increases with maternal age. We discussed specific features of common chromosome abnormalities, including Down syndrome, trisomy 13, trisomy 18, and sex chromosome trisomies.      - At age 25 at midtrimester, the risk to have a baby with Down syndrome is 1 in 1114.    - At age 25 at midtrimester, the risk to have a baby with any chromosome abnormality is 1 in 557.       Rosibel did not have maternal serum screening earlier in pregnancy.         Testing Options:   We discussed the following options:   Non-invasive Prenatal Testing (NIPT)    Maternal plasma cell-free DNA testing; first trimester ultrasound with nuchal  translucency and nasal bone assessment is recommended, when appropriate    Screens for fetal trisomy 21, trisomy 13, trisomy 18, and sex chromosome aneuploidy    Cannot screen for open neural tube defects; maternal serum AFP after 15 weeks is recommended      ,  Genetic Amniocentesis    Invasive procedure typically performed in the second trimester by which amniotic fluid is obtained for the purpose of chromosome analysis and/or other prenatal genetic analysis    Diagnostic results; >99% sensitivity for fetal chromosome abnormalities    AFAFP measurement tests for open neural tube defects     and  Comprehensive (Level II) ultrasound: Detailed ultrasound performed between 18-22 weeks gestation to screen for major birth defects and markers for aneuploidy.          We reviewed the benefits and limitations of this testing.  Screening tests provide a risk assessment specific to the pregnancy for certain fetal chromosome abnormalities, but cannot definitively diagnose or exclude a fetal chromosome abnormality.  Follow-up genetic counseling and consideration of diagnostic testing is recommended with any abnormal screening result.     Diagnostic tests carry inherent risks- including risk of miscarriage- that require careful consideration.  These tests can detect fetal chromosome abnormalities with greater than 99% certainty.  Results can be compromised by maternal cell contamination or mosaicism, and are limited by the resolution of cytogenetic G-banding technology.  There is no screening nor diagnostic test that can detect all forms of birth defects or mental disability.    It was a pleasure to be involved with Rosibel s care. Face-to-face time of the meeting was 30 minutes.    Liban Champion MS, [facility]  Licensed Genetic Counselor  Phone: [phone]  Pager: [phone]

## 2021-06-14 ENCOUNTER — TELEPHONE (OUTPATIENT)
Dept: OBGYN | Facility: CLINIC | Age: 26
End: 2021-06-14

## 2021-06-14 NOTE — TELEPHONE ENCOUNTER
Panel Management Review        Health Maintenance List    Health Maintenance   Topic Date Due     COVID-19 Vaccine (1) Never done     HEPATITIS C SCREENING  Never done     CHLAMYDIA SCREENING  11/16/2021     PAP  05/13/2022     PREVENTIVE CARE VISIT  05/14/2022     ADVANCE CARE PLANNING  04/15/2026     DTAP/TDAP/TD IMMUNIZATION (10 - Td) 01/11/2031     HIV SCREENING  Completed     PHQ-2  Completed     INFLUENZA VACCINE  Completed     IPV IMMUNIZATION  Completed     HPV IMMUNIZATION  Completed     MENINGITIS IMMUNIZATION  Completed     HEPATITIS B IMMUNIZATION  Completed     Pneumococcal Vaccine: Pediatrics (0 to 5 Years) and At-Risk Patients (6 to 64 Years)  Aged Out       Composite cancer screening  Chart review shows that this patient is due/due soon for the following Pap Smear  No results found for: PAP  Past Surgical History:   Procedure Laterality Date     ADENOIDECTOMY       LAPAROSCOPIC APPENDECTOMY       MOUTH SURGERY         Is hysterectomy listed in surgical history? No   Is mastectomy listed in surgical history? No     Summary:    Patient is due/failing the following:   Pap Smear    Action needed: Patient needs office visit for pap smear.    Type of outreach:  Sent [email or web address] message.      Staff Signature:  Chani Hayes MA

## 2021-10-11 ENCOUNTER — HEALTH MAINTENANCE LETTER (OUTPATIENT)
Age: 26
End: 2021-10-11

## 2021-12-09 ENCOUNTER — TELEPHONE (OUTPATIENT)
Dept: OBGYN | Facility: CLINIC | Age: 26
End: 2021-12-09
Payer: COMMERCIAL

## 2021-12-09 NOTE — TELEPHONE ENCOUNTER
Panel Management Review        Health Maintenance List    Health Maintenance   Topic Date Due     COVID-19 Vaccine (1) Never done     HEPATITIS C SCREENING  Never done     INFLUENZA VACCINE (1) 09/01/2021     CHLAMYDIA SCREENING  11/16/2021     PAP  05/13/2022     PREVENTIVE CARE VISIT  05/14/2022     ADVANCE CARE PLANNING  04/15/2026     DTAP/TDAP/TD IMMUNIZATION (10 - Td or Tdap) 01/11/2031     HIV SCREENING  Completed     PHQ-2  Completed     IPV IMMUNIZATION  Completed     HPV IMMUNIZATION  Completed     MENINGITIS IMMUNIZATION  Completed     HEPATITIS B IMMUNIZATION  Completed     Pneumococcal Vaccine: Pediatrics (0 to 5 Years) and At-Risk Patients (6 to 64 Years)  Aged Out       Composite cancer screening  Chart review shows that this patient is due/due soon for the following Pap Smear  No results found for: PAP  Past Surgical History:   Procedure Laterality Date     ADENOIDECTOMY       LAPAROSCOPIC APPENDECTOMY       MOUTH SURGERY         Is hysterectomy listed in surgical history? No   Is mastectomy listed in surgical history? No     Summary:    Patient is due/failing the following:   Pap Smear    Action needed: Patient needs office visit for pap smear.    Type of outreach:  Sent [employer] message.      Staff Signature:  Chani Hayes MA

## 2022-01-31 ENCOUNTER — TELEPHONE (OUTPATIENT)
Dept: FAMILY MEDICINE | Facility: CLINIC | Age: 27
End: 2022-01-31
Payer: COMMERCIAL

## 2022-01-31 NOTE — TELEPHONE ENCOUNTER
Patient Quality Outreach    Patient is due for the following:   Chlamydia    NEXT STEPS:   Schedule a lab only visit for chlamydia    Type of outreach:    Sent [employer] message.      Questions for provider review:    None     Elda Sandoval, CMA

## 2022-02-07 NOTE — LETTER
December 9, 2021      Rosibel Saeed  [address]  [place] [address]    Dear Ms.Christophe,      This letter is to remind you that you are due for your follow up PAP smear.    Please call [phone] to schedule your appointment at your earliest convenience.     If you have completed the tests outside of [facility], please have the results forwarded to our office. We will update the chart for your primary Physician to review before your next annual physical.     Sincerely,      Your [facility] Care Team    
Sandie

## 2022-04-11 ENCOUNTER — TELEPHONE (OUTPATIENT)
Dept: FAMILY MEDICINE | Facility: CLINIC | Age: 27
End: 2022-04-11
Payer: COMMERCIAL

## 2022-04-11 NOTE — TELEPHONE ENCOUNTER
Patient Quality Outreach    Patient is due for the following:   Cervical Cancer Screening - PAP Needed    NEXT STEPS:   Schedule a office visit for pap only     Type of outreach:    Sent [employer] message.      Questions for provider review:    None     Namita Mariscal MA

## 2022-06-14 ENCOUNTER — TELEPHONE (OUTPATIENT)
Dept: OBGYN | Facility: CLINIC | Age: 27
End: 2022-06-14
Payer: COMMERCIAL

## 2022-06-14 NOTE — LETTER
To ensure we are providing the best quality care, we have reviewed your chart and see that you are due for:    Cervical Cancer Screening:    Please call Dept: [phone] to schedule an Annual Exam with Pap Smear.    If you have completed the tests outside of [facility], please have the results forwarded to our office Fax # [phone]. We will update the chart for your primary Physician to review before your next annual physical.    Thank you for trusting us with your health care.

## 2022-06-14 NOTE — TELEPHONE ENCOUNTER
Panel Management Review        Health Maintenance List    Health Maintenance   Topic Date Due     COVID-19 Vaccine (1) Never done     HEPATITIS C SCREENING  Never done     PHQ-2 (once per calendar year)  01/01/2022     PREVENTIVE CARE VISIT  05/14/2022     PAP  05/13/2022     INFLUENZA VACCINE (Season Ended) 09/01/2022     ADVANCE CARE PLANNING  04/15/2026     DTAP/TDAP/TD IMMUNIZATION (10 - Td or Tdap) 01/11/2031     HIV SCREENING  Completed     IPV IMMUNIZATION  Completed     HPV IMMUNIZATION  Completed     MENINGITIS IMMUNIZATION  Completed     HEPATITIS B IMMUNIZATION  Completed     Pneumococcal Vaccine: Pediatrics (0 to 5 Years) and At-Risk Patients (6 to 64 Years)  Aged Out       Composite cancer screening  Chart review shows that this patient is due/due soon for the following Pap Smear  No results found for: PAP  Past Surgical History:   Procedure Laterality Date     ADENOIDECTOMY       LAPAROSCOPIC APPENDECTOMY       MOUTH SURGERY         Is hysterectomy listed in surgical history? No   Is mastectomy listed in surgical history? No     Summary:    Patient is due/failing the following:   Pap Smear    Action needed: Patient needs office visit for pap smear.    Type of outreach:  Sent [employer] message.      Staff Signature:  Chani Hayes MA

## 2022-06-23 DIAGNOSIS — Z30.011 ENCOUNTER FOR INITIAL PRESCRIPTION OF CONTRACEPTIVE PILLS: ICD-10-CM

## 2022-06-23 RX ORDER — ACETAMINOPHEN AND CODEINE PHOSPHATE 120; 12 MG/5ML; MG/5ML
0.35 SOLUTION ORAL DAILY
Qty: 86 TABLET | Refills: 0 | Status: SHIPPED | OUTPATIENT
Start: 2022-06-23 | End: 2022-08-24

## 2022-06-23 NOTE — TELEPHONE ENCOUNTER
"Last Written Prescription Date: 5/14/2021  Last Fill Quantity: 86,  # refills: 4   Last office visit: Visit date not found with prescribing provider: 5/14/2021 with Dr. Engle   Future Office Visit:  Not scheduled      Requested Prescriptions   Pending Prescriptions Disp Refills     norethindrone (MICRONOR) 0.35 MG tablet 86 tablet 4     Sig: Take 1 tablet (0.35 mg) by mouth daily       Contraceptives Protocol Failed - 6/23/2022  2:19 PM        Failed - Recent (12 mo) or future (30 days) visit within the authorizing provider's specialty     Patient has had an office visit with the authorizing provider or a provider within the authorizing providers department within the previous 12 mos or has a future within next 30 days. See \"Patient Info\" tab in inbasket, or \"Choose Columns\" in Meds & Orders section of the refill encounter.              Passed - Patient is not a current smoker if age is 35 or older        Passed - Medication is active on med list        Passed - No active pregnancy on record        Passed - No positive pregnancy test in past 12 months           Routing refill request to provider for review/approval because:  Patient needs to be seen because it has been more than 1 year since last office visit. Pamela refill x1  Message left for patient to return call to clinic to notify of need for office visit.    Christal Lerner   [facility]  RN          "

## 2022-06-23 NOTE — TELEPHONE ENCOUNTER
Pt notified of one refill and need for office visit for any additional refills.  Pt reports understanding.  Pt does not have further questions or concerns.    Christal Lerner   [facility]  RN

## 2022-07-17 ENCOUNTER — HEALTH MAINTENANCE LETTER (OUTPATIENT)
Age: 27
End: 2022-07-17

## 2022-08-17 ASSESSMENT — ENCOUNTER SYMPTOMS
HEADACHES: 0
JOINT SWELLING: 0
BREAST MASS: 0
FEVER: 0
HEMATURIA: 0
DIZZINESS: 0
PARESTHESIAS: 0
NERVOUS/ANXIOUS: 0
EYE PAIN: 0
SHORTNESS OF BREATH: 0
PALPITATIONS: 0
CONSTIPATION: 0
NAUSEA: 0
ABDOMINAL PAIN: 0
FREQUENCY: 0
ARTHRALGIAS: 0
HEARTBURN: 0
HEMATOCHEZIA: 0
WEAKNESS: 0
DYSURIA: 0
COUGH: 0
CHILLS: 0
DIARRHEA: 0
SORE THROAT: 0
MYALGIAS: 0

## 2022-08-24 ENCOUNTER — OFFICE VISIT (OUTPATIENT)
Dept: FAMILY MEDICINE | Facility: CLINIC | Age: 27
End: 2022-08-24
Payer: COMMERCIAL

## 2022-08-24 VITALS
RESPIRATION RATE: 16 BRPM | SYSTOLIC BLOOD PRESSURE: 110 MMHG | HEIGHT: 63 IN | BODY MASS INDEX: 33.31 KG/M2 | TEMPERATURE: 97.4 F | HEART RATE: 84 BPM | WEIGHT: 188 LBS | DIASTOLIC BLOOD PRESSURE: 74 MMHG

## 2022-08-24 DIAGNOSIS — Z00.00 ROUTINE GENERAL MEDICAL EXAMINATION AT A HEALTH CARE FACILITY: Primary | ICD-10-CM

## 2022-08-24 DIAGNOSIS — Z23 HIGH PRIORITY FOR 2019-NCOV VACCINE: ICD-10-CM

## 2022-08-24 DIAGNOSIS — N76.0 BACTERIAL VAGINOSIS: ICD-10-CM

## 2022-08-24 DIAGNOSIS — B96.89 BACTERIAL VAGINOSIS: ICD-10-CM

## 2022-08-24 DIAGNOSIS — Z12.4 CERVICAL CANCER SCREENING: ICD-10-CM

## 2022-08-24 DIAGNOSIS — Z30.011 ENCOUNTER FOR INITIAL PRESCRIPTION OF CONTRACEPTIVE PILLS: ICD-10-CM

## 2022-08-24 DIAGNOSIS — N89.8 VAGINAL DISCHARGE: ICD-10-CM

## 2022-08-24 LAB
CLUE CELLS: PRESENT
TRICHOMONAS, WET PREP: ABNORMAL
WBC'S/HIGH POWER FIELD, WET PREP: ABNORMAL
YEAST, WET PREP: ABNORMAL

## 2022-08-24 PROCEDURE — 91305 COVID-19,PF,PFIZER (12+ YRS): CPT | Performed by: NURSE PRACTITIONER

## 2022-08-24 PROCEDURE — 99395 PREV VISIT EST AGE 18-39: CPT | Mod: 25 | Performed by: NURSE PRACTITIONER

## 2022-08-24 PROCEDURE — 0054A COVID-19,PF,PFIZER (12+ YRS): CPT | Performed by: NURSE PRACTITIONER

## 2022-08-24 PROCEDURE — 87210 SMEAR WET MOUNT SALINE/INK: CPT | Performed by: NURSE PRACTITIONER

## 2022-08-24 PROCEDURE — 99213 OFFICE O/P EST LOW 20 MIN: CPT | Mod: 25 | Performed by: NURSE PRACTITIONER

## 2022-08-24 PROCEDURE — G0145 SCR C/V CYTO,THINLAYER,RESCR: HCPCS | Performed by: NURSE PRACTITIONER

## 2022-08-24 RX ORDER — ACETAMINOPHEN AND CODEINE PHOSPHATE 120; 12 MG/5ML; MG/5ML
0.35 SOLUTION ORAL DAILY
Qty: 84 TABLET | Refills: 3 | Status: SHIPPED | OUTPATIENT
Start: 2022-08-24 | End: 2023-01-05

## 2022-08-24 RX ORDER — METRONIDAZOLE 500 MG/1
500 TABLET ORAL 2 TIMES DAILY
Qty: 14 TABLET | Refills: 0 | Status: SHIPPED | OUTPATIENT
Start: 2022-08-24 | End: 2022-08-31

## 2022-08-24 ASSESSMENT — ENCOUNTER SYMPTOMS
DYSURIA: 0
NAUSEA: 0
FREQUENCY: 0
PALPITATIONS: 0
PARESTHESIAS: 0
WEAKNESS: 0
EYE PAIN: 0
HEMATURIA: 0
BREAST MASS: 0
HEADACHES: 0
NERVOUS/ANXIOUS: 0
FEVER: 0
DIARRHEA: 0
SORE THROAT: 0
CHILLS: 0
HEARTBURN: 0
ARTHRALGIAS: 0
JOINT SWELLING: 0
MYALGIAS: 0
HEMATOCHEZIA: 0
DIZZINESS: 0
COUGH: 0
ABDOMINAL PAIN: 0
SHORTNESS OF BREATH: 0
CONSTIPATION: 0
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
## 2022-08-27 LAB
BKR LAB AP GYN ADEQUACY: NORMAL
BKR LAB AP GYN INTERPRETATION: NORMAL
BKR LAB AP HPV REFLEX: NORMAL
BKR LAB AP LMP: NORMAL
BKR LAB AP PREVIOUS ABNORMAL: NORMAL
PATH REPORT.COMMENTS IMP SPEC: NORMAL
PATH REPORT.COMMENTS IMP SPEC: NORMAL
PATH REPORT.RELEVANT HX SPEC: NORMAL

## 2022-09-24 ENCOUNTER — HEALTH MAINTENANCE LETTER (OUTPATIENT)
Age: 27
End: 2022-09-24

## 2023-01-04 ENCOUNTER — APPOINTMENT (OUTPATIENT)
Dept: OBGYN | Facility: CLINIC | Age: 28
End: 2023-01-04
Payer: COMMERCIAL

## 2023-01-05 ENCOUNTER — PRENATAL OFFICE VISIT (OUTPATIENT)
Dept: OBGYN | Facility: CLINIC | Age: 28
End: 2023-01-05
Payer: COMMERCIAL

## 2023-01-05 DIAGNOSIS — Z34.80 PRENATAL CARE, SUBSEQUENT PREGNANCY: ICD-10-CM

## 2023-01-05 PROCEDURE — 99207 PR NO CHARGE NURSE ONLY: CPT | Performed by: STUDENT IN AN ORGANIZED HEALTH CARE EDUCATION/TRAINING PROGRAM

## 2023-01-05 RX ORDER — PRENATAL VIT/IRON FUM/FOLIC AC 27MG-0.8MG
1 TABLET ORAL DAILY
COMMUNITY
Start: 2022-12-28

## 2023-01-05 NOTE — PROGRESS NOTES
Denied need for  review of  Teaching  [facility] OB Intake Nurse    Patient supplied answers from flow sheet for:  Prenatal OB Questionnaire.  Past Medical History  Have you ever recieved care for your mental health? : No  Have you ever been in a major accident or suffered serious trauma?: No  Within the last year, has anyone hit, slapped, kicked or otherwise hurt you?: No  In the last year, has anyone forced you to have sex when you didn't want to?: No    Past Medical History 2   Have you ever received a blood transfusion?: No  Would you accept a blood transfusion if was medically recommended?: Yes  Does anyone in your home smoke?: No   Is your blood type Rh negative?: Unknown  Have you ever ?: (!) Yes  Have you been hospitalized for a nonsurgical reason excluding normal delivery?: No  Have you ever had an abnormal pap smear?: No    Past Medical History (Continued)  Do you have a history of abnormalities of the uterus?: No  Did your mother take SHERLEY or any other hormones when she was pregnant with you?: No  Do you have any other problems we have not asked about which you feel may be important to this pregnancy?: No

## 2023-01-16 LAB
ABO/RH(D): NORMAL
ANTIBODY SCREEN: NEGATIVE
SPECIMEN EXPIRATION DATE: NORMAL

## 2023-01-17 ENCOUNTER — PRENATAL OFFICE VISIT (OUTPATIENT)
Dept: OBGYN | Facility: CLINIC | Age: 28
End: 2023-01-17
Payer: COMMERCIAL

## 2023-01-17 VITALS
TEMPERATURE: 98.4 F | SYSTOLIC BLOOD PRESSURE: 112 MMHG | DIASTOLIC BLOOD PRESSURE: 76 MMHG | WEIGHT: 179 LBS | HEART RATE: 93 BPM | HEIGHT: 63 IN | BODY MASS INDEX: 31.71 KG/M2 | RESPIRATION RATE: 18 BRPM

## 2023-01-17 DIAGNOSIS — Z23 NEED FOR PROPHYLACTIC VACCINATION AND INOCULATION AGAINST INFLUENZA: ICD-10-CM

## 2023-01-17 DIAGNOSIS — Z34.81 PRENATAL CARE, SUBSEQUENT PREGNANCY IN FIRST TRIMESTER: Primary | ICD-10-CM

## 2023-01-17 LAB
ALBUMIN UR-MCNC: ABNORMAL MG/DL
APPEARANCE UR: CLEAR
BACTERIA #/AREA URNS HPF: ABNORMAL /HPF
BILIRUB UR QL STRIP: NEGATIVE
COLOR UR AUTO: YELLOW
ERYTHROCYTE [DISTWIDTH] IN BLOOD BY AUTOMATED COUNT: 11.4 % (ref 10–15)
GLUCOSE UR STRIP-MCNC: NEGATIVE MG/DL
HBA1C MFR BLD: 4.9 %
HBV SURFACE AG SERPL QL IA: NONREACTIVE
HCT VFR BLD AUTO: 38.4 % (ref 35–47)
HCV AB SERPL QL IA: NONREACTIVE
HGB BLD-MCNC: 13.4 G/DL (ref 11.7–15.7)
HGB UR QL STRIP: NEGATIVE
HIV 1+2 AB+HIV1 P24 AG SERPL QL IA: NONREACTIVE
KETONES UR STRIP-MCNC: NEGATIVE MG/DL
LEUKOCYTE ESTERASE UR QL STRIP: ABNORMAL
MCH RBC QN AUTO: 30.3 PG (ref 26.5–33)
MCHC RBC AUTO-ENTMCNC: 34.9 G/DL (ref 31.5–36.5)
MCV RBC AUTO: 87 FL (ref 78–100)
NITRATE UR QL: NEGATIVE
PH UR STRIP: 6.5 [PH] (ref 5–7)
PLATELET # BLD AUTO: 239 10E3/UL (ref 150–450)
RBC # BLD AUTO: 4.42 10E6/UL (ref 3.8–5.2)
RBC #/AREA URNS AUTO: ABNORMAL /HPF
RUBV IGG SERPL QL IA: 9.74 INDEX
RUBV IGG SERPL QL IA: POSITIVE
SP GR UR STRIP: >=1.03 (ref 1–1.03)
SQUAMOUS #/AREA URNS AUTO: ABNORMAL /LPF
T PALLIDUM AB SER QL: NONREACTIVE
UROBILINOGEN UR STRIP-ACNC: 0.2 E.U./DL
WBC # BLD AUTO: 8.5 10E3/UL (ref 4–11)
WBC #/AREA URNS AUTO: ABNORMAL /HPF

## 2023-01-17 PROCEDURE — 86762 RUBELLA ANTIBODY: CPT | Performed by: STUDENT IN AN ORGANIZED HEALTH CARE EDUCATION/TRAINING PROGRAM

## 2023-01-17 PROCEDURE — 36415 COLL VENOUS BLD VENIPUNCTURE: CPT | Performed by: STUDENT IN AN ORGANIZED HEALTH CARE EDUCATION/TRAINING PROGRAM

## 2023-01-17 PROCEDURE — 86900 BLOOD TYPING SEROLOGIC ABO: CPT | Performed by: STUDENT IN AN ORGANIZED HEALTH CARE EDUCATION/TRAINING PROGRAM

## 2023-01-17 PROCEDURE — 86901 BLOOD TYPING SEROLOGIC RH(D): CPT | Performed by: STUDENT IN AN ORGANIZED HEALTH CARE EDUCATION/TRAINING PROGRAM

## 2023-01-17 PROCEDURE — 87491 CHLMYD TRACH DNA AMP PROBE: CPT | Performed by: STUDENT IN AN ORGANIZED HEALTH CARE EDUCATION/TRAINING PROGRAM

## 2023-01-17 PROCEDURE — 86780 TREPONEMA PALLIDUM: CPT | Performed by: STUDENT IN AN ORGANIZED HEALTH CARE EDUCATION/TRAINING PROGRAM

## 2023-01-17 PROCEDURE — 81001 URINALYSIS AUTO W/SCOPE: CPT | Performed by: STUDENT IN AN ORGANIZED HEALTH CARE EDUCATION/TRAINING PROGRAM

## 2023-01-17 PROCEDURE — 83036 HEMOGLOBIN GLYCOSYLATED A1C: CPT | Performed by: STUDENT IN AN ORGANIZED HEALTH CARE EDUCATION/TRAINING PROGRAM

## 2023-01-17 PROCEDURE — 99207 PR PRENATAL VISIT: CPT | Performed by: STUDENT IN AN ORGANIZED HEALTH CARE EDUCATION/TRAINING PROGRAM

## 2023-01-17 PROCEDURE — 87591 N.GONORRHOEAE DNA AMP PROB: CPT | Performed by: STUDENT IN AN ORGANIZED HEALTH CARE EDUCATION/TRAINING PROGRAM

## 2023-01-17 PROCEDURE — 87389 HIV-1 AG W/HIV-1&-2 AB AG IA: CPT | Performed by: STUDENT IN AN ORGANIZED HEALTH CARE EDUCATION/TRAINING PROGRAM

## 2023-01-17 PROCEDURE — 87340 HEPATITIS B SURFACE AG IA: CPT | Performed by: STUDENT IN AN ORGANIZED HEALTH CARE EDUCATION/TRAINING PROGRAM

## 2023-01-17 PROCEDURE — 90686 IIV4 VACC NO PRSV 0.5 ML IM: CPT | Performed by: STUDENT IN AN ORGANIZED HEALTH CARE EDUCATION/TRAINING PROGRAM

## 2023-01-17 PROCEDURE — 90471 IMMUNIZATION ADMIN: CPT | Performed by: STUDENT IN AN ORGANIZED HEALTH CARE EDUCATION/TRAINING PROGRAM

## 2023-01-17 PROCEDURE — 86850 RBC ANTIBODY SCREEN: CPT | Performed by: STUDENT IN AN ORGANIZED HEALTH CARE EDUCATION/TRAINING PROGRAM

## 2023-01-17 PROCEDURE — 87086 URINE CULTURE/COLONY COUNT: CPT | Performed by: STUDENT IN AN ORGANIZED HEALTH CARE EDUCATION/TRAINING PROGRAM

## 2023-01-17 PROCEDURE — 86803 HEPATITIS C AB TEST: CPT | Performed by: STUDENT IN AN ORGANIZED HEALTH CARE EDUCATION/TRAINING PROGRAM

## 2023-01-17 PROCEDURE — 85027 COMPLETE CBC AUTOMATED: CPT | Performed by: STUDENT IN AN ORGANIZED HEALTH CARE EDUCATION/TRAINING PROGRAM

## 2023-01-17 NOTE — PROGRESS NOTES
[facility]  New OB Visit Note         Assessment and Plan:      Rosibel Saeed, 27 year old  at 7w2d by 7w2d US, presents for her initial OB visit.     Routine prenatal care:  - New OB labs ordered today: T&S, CBC, RPR/HIV/HepB surface antigen/HepC antibody, Rubella antibody, chlamydia/gonorrhea PCR; PAP smear 2022 NILM  - Dating/viability US performed today   - Genetic screen: declined  - Immunization: influenza vaccine given today.  - Counseling: Discussed routine prenatal care, group practice model at [facility], tertiary support and frequency of visits. Reviewed miscarriage precautions (present to ED or call clinic with abdominal pain, vaginal bleeding or fever). Weight gain: discussed weight gain expectations (BMI <18.5: 28-40lbs, BMI 18.5-25: 25-35lbs, BMI 25-30: 15-25lbs, BMI >30: 11-20lbs)   - Delivery plan: continue to discuss route of delivery, breastfeeding, pp contraception, pain management in labor    Pregravida BMI 32.74: hgb A1c obtained today.    RTC 4 weeks    Seema Stephenson MD  Obstetrics and Gynecology  [facility]   2023          Subjective:   This is a unplanned pregnancy and her and her partner are excited. She reports feeling well.  Denied vaginal bleeding.  Occasional uterine cramping.    ROS: A 10 pt ROS was completed and found to be negative unless mentioned in the HPI.          OB History:     OB History    Para Term  AB Living   2 1 1 0 0 1   SAB IAB Ectopic Multiple Live Births   0 0 0 0 1      # Outcome Date GA Lbr Og/2nd Weight Sex Delivery Anes PTL Lv   2 Current            1 Term 21 41w0d 06:30 / 02:54 3.685 kg (8 lb 2 oz) M Vag-Spont EPI N FREDA      Name: King      Apgar1: 8  Apgar5: 9     Specifically, denied history of GDM, gHTN, PPH, or shoulder dystocia.        Past Medical History:     Past Medical History:   Diagnosis Date     Chickenpox      Specifically, denied history of asthma or  Carmelo.       Past Surgical History:     Past Surgical History:   Procedure Laterality Date     ADENOIDECTOMY       LAPAROSCOPIC APPENDECTOMY       MOUTH SURGERY       Specifically, denied history of abdominal surgeries except the appendectomy.       Social History:   Denies smoking, alcohol use, or recreational drug use.  Works as a teacher.  Social History     Tobacco Use     Smoking status: Never     Smokeless tobacco: Never   Substance Use Topics     Alcohol use: Not Currently     Comment: occas-quit with pregnancy          Family History:   Denied family history of fetal congenital abnormalities, bleeding disorder, clotting disorder.  Family History   Problem Relation Age of Onset     Seizure Disorder Mother      Spina bifida Mother      Colon Cancer Mother      Diabetes Mother         type II     No Known Problems Father      Cerebrovascular Disease Maternal Grandmother      Bleeding Disorder Maternal Grandmother         ?history of blood clots     Hypertension Maternal Grandfather           Immunizations:     Immunization History   Administered Date(s) Administered     COVID-19 Vaccine 12+ (Pfizer 2022) 08/24/2022     COVID-19 Vaccine 18+ (Moderna) 06/18/2021, 07/16/2021     DTAP (<7y) 1997, 1997, 1997, 03/18/1998, 03/18/2002     DTaP, Unspecified 08/04/2008, 08/11/2015     Flu, Unspecified 11/15/2006, 10/14/2015     W5m6-13 Novel Flu P-free 11/18/2009     HPV Quadrivalent 10/10/1998, 08/04/2008, 10/10/2008, 02/13/2009     Hep B, Peds or Adolescent 1997, 1997, 1997     HepA-ped 2 Dose 08/04/2009, 03/16/2012     HepB, Unspecified 1997, 1997, 1997, 1997, 03/18/1998     Hib, Unspecified 1997, 1997, 1997, 03/18/1998     Historic Hib Hib-titer 1997, 1997, 1997, 03/18/1998     Historical DTP/aP 1997, 1997, 1997, 03/18/1998, 09/05/2009     Hpv, Unspecified  08/04/2008, 10/10/2008, 02/13/2009     Influenza  "(High Dose) 3 valent vaccine 11/19/2007, 12/03/2008     Influenza (IIV3) PF 11/15/2006, 11/19/2007, 12/03/2008, 10/20/2011     Influenza Vaccine >6 months (Alfuria,Fluzone) 08/26/2020, 01/17/2023     MMR 03/18/1998, 03/18/2002     Meningococcal ACWY (Menactra ) 08/04/2009, 08/15/2013     OPV, trivalent, live 1997     OPV, unspecified 1997, 1997, 03/18/2002     Poliovirus, inactivated (IPV) 1997, 1997, 1997, 03/18/2002     Tdap (Adacel,Boostrix) 08/04/2008, 01/11/2021     Tdap (Adult) Unspecified Formulation 08/04/2008     Varicella 06/19/1998, 08/04/2009          Allergies:     Allergies   Allergen Reactions     Amoxicillin Rash          Medications:     Current Outpatient Medications   Medication Sig Dispense Refill     cetirizine (ZYRTEC) 10 MG tablet Take 10 mg by mouth daily       Prenatal Vit-Fe Fumarate-FA (PRENATAL MULTIVITAMIN W/IRON) 27-0.8 MG tablet Take 1 tablet by mouth daily           Objective:   /76 (BP Location: Left arm, Patient Position: Chair, Cuff Size: Adult Regular)   Pulse 93   Temp 98.4  F (36.9  C) (Tympanic)   Resp 18   Ht 1.588 m (5' 2.5\")   Wt 81.2 kg (179 lb)   LMP 11/21/2022   BMI 32.22 kg/m      Estimated body mass index is 32.22 kg/m  as calculated from the following:    Height as of this encounter: 1.588 m (5' 2.5\").    Weight as of this encounter: 81.2 kg (179 lb).    General appearance: well-hydrated, A&O x 3, no apparent distress  Lungs: Equal expansion bilaterally, no accessory muscle use  Heart: No heaves or thrills. No peripheral varicosities  Constitutional: See vitals  Abdomen: Soft, non-tender, non-distended. No rebound, rigidity, or guarding.  Extremities: trace edema  Genitourinary:  External genitalia: no erythema, no lesions.   Urethral meatus appropriate location without lesions or prolapse  Urethra: No masses, tenderness, or scarring  Bladder no fullness, masses, or tenderness.  Anus and Perineum: Unremarkable, no " visible lesions  Vagina: Normal, healthy pink mucosa without any lesions. Physiologic vaginal discharge.   Cervix: normal appearance, no cervical motion tenderness.   Uterus: gravid  Adnexa: no masses or tenderness bilaterally.         Labs/Imagings:     Bedside Ultrasound:     Transvaginal ultrasound was performed. A intrauterine pregnancy was seen.      CRL= 1.21 cm   FHT= 156 bpm  EGA= 7 weeks 2 days  HANY based on US = 9/3/2023  HANY by LMP= 8/28/2023  FINAL HANY= 9/3/2023

## 2023-01-17 NOTE — NURSING NOTE
"Initial /76 (BP Location: Left arm, Patient Position: Chair, Cuff Size: Adult Regular)   Pulse 93   Temp 98.4  F (36.9  C) (Tympanic)   Resp 18   Ht 1.588 m (5' 2.5\")   Wt 81.2 kg (179 lb)   LMP 11/21/2022   BMI 32.22 kg/m   Estimated body mass index is 32.22 kg/m  as calculated from the following:    Height as of this encounter: 1.588 m (5' 2.5\").    Weight as of this encounter: 81.2 kg (179 lb). .      "

## 2023-01-18 LAB
BACTERIA UR CULT: NORMAL
C TRACH DNA SPEC QL PROBE+SIG AMP: NEGATIVE
N GONORRHOEA DNA SPEC QL NAA+PROBE: NEGATIVE

## 2023-02-13 ENCOUNTER — PRENATAL OFFICE VISIT (OUTPATIENT)
Dept: OBGYN | Facility: CLINIC | Age: 28
End: 2023-02-13
Payer: COMMERCIAL

## 2023-02-13 VITALS
BODY MASS INDEX: 31.18 KG/M2 | HEIGHT: 63 IN | WEIGHT: 176 LBS | TEMPERATURE: 98.8 F | RESPIRATION RATE: 16 BRPM | SYSTOLIC BLOOD PRESSURE: 104 MMHG | DIASTOLIC BLOOD PRESSURE: 69 MMHG | HEART RATE: 74 BPM

## 2023-02-13 DIAGNOSIS — Z34.81 PRENATAL CARE, SUBSEQUENT PREGNANCY IN FIRST TRIMESTER: Primary | ICD-10-CM

## 2023-02-13 PROBLEM — Z34.00 PRENATAL CARE, FIRST PREGNANCY: Status: RESOLVED | Noted: 2020-10-14 | Resolved: 2023-02-13

## 2023-02-13 PROBLEM — O48.0 POST TERM PREGNANCY: Status: RESOLVED | Noted: 2021-03-31 | Resolved: 2023-02-13

## 2023-02-13 PROCEDURE — 99207 PR PRENATAL VISIT: CPT | Performed by: OBSTETRICS & GYNECOLOGY

## 2023-02-13 NOTE — PROGRESS NOTES
"[facility]    Return OB Note    CC: Return OB     Subjective:  Rosibel is a 27 year old  at 11w1d   Denies vaginal bleeding, loss of fluid, or pelvic cramping. No fetal movement yet.  Complaints today: None    Objective:  /69 (BP Location: Right arm, Patient Position: Chair, Cuff Size: Adult Regular)   Pulse 74   Temp 98.8  F (37.1  C) (Tympanic)   Resp 16   Ht 1.588 m (5' 2.5\")   Wt 79.8 kg (176 lb)   LMP 2022   BMI 31.68 kg/m      FHT: 158bpm, active fetus seen on BSUS    Assessment/Plan:   Encounter Diagnosis   Name Primary?     Prenatal care, subsequent pregnancy in first trimester Yes       IUP at 11w1d  -NOB labs normal  -Genetic screening: declines      RTC 4 weeks    Luisana Caba DO    "

## 2023-02-13 NOTE — NURSING NOTE
"Initial /69 (BP Location: Right arm, Patient Position: Chair, Cuff Size: Adult Regular)   Pulse 74   Temp 98.8  F (37.1  C) (Tympanic)   Resp 16   Ht 1.588 m (5' 2.5\")   Wt 79.8 kg (176 lb)   LMP 11/21/2022   BMI 31.68 kg/m   Estimated body mass index is 31.68 kg/m  as calculated from the following:    Height as of this encounter: 1.588 m (5' 2.5\").    Weight as of this encounter: 79.8 kg (176 lb). .      "

## 2023-03-13 ENCOUNTER — PRENATAL OFFICE VISIT (OUTPATIENT)
Dept: OBGYN | Facility: CLINIC | Age: 28
End: 2023-03-13
Payer: COMMERCIAL

## 2023-03-13 VITALS
BODY MASS INDEX: 31.64 KG/M2 | HEART RATE: 79 BPM | TEMPERATURE: 97.9 F | DIASTOLIC BLOOD PRESSURE: 71 MMHG | SYSTOLIC BLOOD PRESSURE: 125 MMHG | WEIGHT: 178.6 LBS | RESPIRATION RATE: 16 BRPM | HEIGHT: 63 IN

## 2023-03-13 DIAGNOSIS — Z34.82 PRENATAL CARE, SUBSEQUENT PREGNANCY IN SECOND TRIMESTER: Primary | ICD-10-CM

## 2023-03-13 PROCEDURE — 99207 PR PRENATAL VISIT: CPT | Performed by: OBSTETRICS & GYNECOLOGY

## 2023-03-13 NOTE — NURSING NOTE
"Initial /71 (BP Location: Right arm, Patient Position: Chair, Cuff Size: Adult Regular)   Pulse 79   Temp 97.9  F (36.6  C) (Tympanic)   Resp 16   Ht 1.588 m (5' 2.5\")   Wt 81 kg (178 lb 9.6 oz)   LMP 11/21/2022   Breastfeeding No   BMI 32.15 kg/m   Estimated body mass index is 32.15 kg/m  as calculated from the following:    Height as of this encounter: 1.588 m (5' 2.5\").    Weight as of this encounter: 81 kg (178 lb 9.6 oz). .    Iesha Byrd, MARK    "

## 2023-03-13 NOTE — PROGRESS NOTES
"[facility]     Return OB Note     CC: Return OB      Subjective:  Rosibel is a 27 year old  at 15w1d   Denies vaginal bleeding, loss of fluid, or pelvic cramping. No fetal movement yet.  Complaints today: None     Objective:  /71 (BP Location: Right arm, Patient Position: Chair, Cuff Size: Adult Regular)   Pulse 79   Temp 97.9  F (36.6  C) (Tympanic)   Resp 16   Ht 1.588 m (5' 2.5\")   Wt 81 kg (178 lb 9.6 oz)   LMP 2022   Breastfeeding No   BMI 32.15 kg/m       FHR: 138bpm     Assessment/Plan:        Encounter Diagnosis   Name Primary?     Prenatal care, subsequent pregnancy in first trimester Yes         IUP at 15w1d  -NOB labs normal  -Genetic screening: declines  -Declined quad screen, schedule 20 wk US        RTC 4 weeks     Carmen Campbell, NP student on 3/13/2023 at 4:13 PM    I agree with the NP student's documentation above and have edited it for accuracy. I was present for and performed the physical exam and interview of the patient myself. All medical decision-making was performed by me. If a procedure was performed, that was performed by me. Additionally, if billing is based on time, I am only using the time that I personally spent with the patient in my time statement.     Usha Engle MD  OB/GYN       "

## 2023-04-10 ENCOUNTER — PRENATAL OFFICE VISIT (OUTPATIENT)
Dept: OBGYN | Facility: CLINIC | Age: 28
End: 2023-04-10
Payer: COMMERCIAL

## 2023-04-10 VITALS
HEIGHT: 63 IN | DIASTOLIC BLOOD PRESSURE: 71 MMHG | WEIGHT: 177 LBS | BODY MASS INDEX: 31.36 KG/M2 | TEMPERATURE: 98.4 F | SYSTOLIC BLOOD PRESSURE: 114 MMHG | RESPIRATION RATE: 16 BRPM | HEART RATE: 82 BPM

## 2023-04-10 DIAGNOSIS — Z3A.19 19 WEEKS GESTATION OF PREGNANCY: Primary | ICD-10-CM

## 2023-04-10 PROCEDURE — 99207 PR PRENATAL VISIT: CPT | Performed by: OBSTETRICS & GYNECOLOGY

## 2023-04-10 NOTE — PROGRESS NOTES
"[facility]    Return OB Note    CC: Return OB     Subjective:  Rosibel is a 28 year old  at 19w1d   Denies vaginal bleeding, loss of fluid, or pelvic cramping. pos fetal movement.  Complaints today: some cramping on occasion    Objective:  /71 (BP Location: Left arm, Patient Position: Left side, Cuff Size: Adult Regular)   Pulse 82   Temp 98.4  F (36.9  C) (Tympanic)   Resp 16   Ht 1.588 m (5' 2.5\")   Wt 80.3 kg (177 lb)   LMP 2022   BMI 31.86 kg/m      See flowsheet    Assessment/Plan:     28 year old  at 19w1d   Encounter Diagnosis   Name Primary?     19 weeks gestation of pregnancy Yes        -NOB labs normal  -Genetic screening: declines  -Declined quad screen, scheduled 20 wk US next week.  -discussed GTT next visit.  Will plan visit in 5 weeks and do GTT then.      RTC 5 weeks    Anali Swann MD  "

## 2023-04-10 NOTE — NURSING NOTE
"Initial /71 (BP Location: Left arm, Patient Position: Left side, Cuff Size: Adult Regular)   Pulse 82   Temp 98.4  F (36.9  C) (Tympanic)   Resp 16   Ht 1.588 m (5' 2.5\")   Wt 80.3 kg (177 lb)   LMP 11/21/2022   BMI 31.86 kg/m   Estimated body mass index is 31.86 kg/m  as calculated from the following:    Height as of this encounter: 1.588 m (5' 2.5\").    Weight as of this encounter: 80.3 kg (177 lb). .      "

## 2023-04-17 ENCOUNTER — HOSPITAL ENCOUNTER (OUTPATIENT)
Dept: ULTRASOUND IMAGING | Facility: HOSPITAL | Age: 28
Discharge: HOME | End: 2023-04-17
Attending: OBSTETRICS & GYNECOLOGY | Admitting: OBSTETRICS & GYNECOLOGY
Payer: COMMERCIAL

## 2023-04-17 DIAGNOSIS — Z34.82 PRENATAL CARE, SUBSEQUENT PREGNANCY IN SECOND TRIMESTER: ICD-10-CM

## 2023-04-17 PROCEDURE — 76805 OB US >/= 14 WKS SNGL FETUS: CPT

## 2023-04-18 DIAGNOSIS — O28.3 ABNORMAL PRENATAL ULTRASOUND: Primary | ICD-10-CM

## 2023-04-19 ENCOUNTER — TRANSCRIBE ORDERS (OUTPATIENT)
Dept: MATERNAL FETAL MEDICINE | Facility: HOSPITAL | Age: 28
End: 2023-04-19
Payer: COMMERCIAL

## 2023-04-19 DIAGNOSIS — O26.90 PREGNANCY RELATED CONDITION, ANTEPARTUM: Primary | ICD-10-CM

## 2023-04-28 ENCOUNTER — PRE VISIT (OUTPATIENT)
Dept: MATERNAL FETAL MEDICINE | Facility: HOSPITAL | Age: 28
End: 2023-04-28
Payer: COMMERCIAL

## 2023-05-02 ENCOUNTER — OFFICE VISIT (OUTPATIENT)
Dept: MATERNAL FETAL MEDICINE | Facility: HOSPITAL | Age: 28
End: 2023-05-02
Attending: OBSTETRICS & GYNECOLOGY
Payer: COMMERCIAL

## 2023-05-02 ENCOUNTER — LAB (OUTPATIENT)
Dept: LAB | Facility: HOSPITAL | Age: 28
End: 2023-05-02
Attending: OBSTETRICS & GYNECOLOGY
Payer: COMMERCIAL

## 2023-05-02 ENCOUNTER — ANCILLARY PROCEDURE (OUTPATIENT)
Dept: ULTRASOUND IMAGING | Facility: HOSPITAL | Age: 28
End: 2023-05-02
Attending: OBSTETRICS & GYNECOLOGY
Payer: COMMERCIAL

## 2023-05-02 DIAGNOSIS — O28.3 ABNORMAL PRENATAL ULTRASOUND: ICD-10-CM

## 2023-05-02 DIAGNOSIS — O26.90 PREGNANCY RELATED CONDITION, ANTEPARTUM: Primary | ICD-10-CM

## 2023-05-02 DIAGNOSIS — O35.9XX0 FETAL ABNORMALITY AFFECTING MANAGEMENT OF MOTHER, ANTEPARTUM, SINGLE OR UNSPECIFIED FETUS: Primary | ICD-10-CM

## 2023-05-02 DIAGNOSIS — O26.90 PREGNANCY RELATED CONDITION, ANTEPARTUM: ICD-10-CM

## 2023-05-02 PROCEDURE — 76811 OB US DETAILED SNGL FETUS: CPT | Mod: 26 | Performed by: OBSTETRICS & GYNECOLOGY

## 2023-05-02 PROCEDURE — 76811 OB US DETAILED SNGL FETUS: CPT

## 2023-05-02 PROCEDURE — 36415 COLL VENOUS BLD VENIPUNCTURE: CPT

## 2023-05-02 PROCEDURE — 96040 HC GENETIC COUNSELING, EACH 30 MINUTES: CPT | Performed by: GENETIC COUNSELOR, MS

## 2023-05-02 PROCEDURE — 99213 OFFICE O/P EST LOW 20 MIN: CPT | Mod: 25 | Performed by: OBSTETRICS & GYNECOLOGY

## 2023-05-02 NOTE — NURSING NOTE
Rosibel Saeed is a  at 22w2d who presents to [facility] for GC and L2 ultrasound. Pt reports positive fetal movement. Pt denies bldg/lof/change in discharge, contractions, headache, vision changes, chest pain/SOB or edema. SBAR given to Dr. De Leon, see note in Epic.

## 2023-05-02 NOTE — PROGRESS NOTES
Please see full imaging report from [employer] program under imaging tab.    Thank-you for referring your patient for a comprehensive ultrasound.    I discussed the findings on today's ultrasound with the patient and her partner. I reviewed the limitations of ultrasound both in detecting aneuploidy and structural abnormalities.  Ultrasound can routinely detect 80-90% of structural abnormalities.      EIF - no screening   An echogenic intracardiac focus (EIF) was noted on today's ultrasound. While this finding is seen in 3-5% of all pregnancies, it is associated with a likelihood ratio for Trisomy 21 of up to 1.8 fold if no risk refinement (NIPT, first trimester screen or amniocentesis) is performed. Since she has not yet had genetic screening this pregnancy, we reviewed the availability of cell free DNA screening for risk refinement . After counseling the patient opted for NIPT/cffDNA screening which will be completed today - we will contact her in approximately a week with the results. Finally, we discussed that an EIF has no structural or functional implications on cardiac function and further evaluation is not necessary either prenatally or postnatally.     We also reviewed that no CP cysts can be identified at this time.     Return to primary provider for continued prenatal care.    If you have questions regarding today's evaluation or if we can be of further service, please contact the [facility].     I spent a total of 15 minutes on the date of this encounter including preparing to see the patient (reviewing medical records/tests), counseling and discussing the plan of care, documenting the visit in the electronic medical record, and communicating with other health care professionals and/or care coordination.    Heidi De Leon MD  Maternal Fetal Medicine

## 2023-05-08 ENCOUNTER — TELEPHONE (OUTPATIENT)
Dept: MATERNAL FETAL MEDICINE | Facility: HOSPITAL | Age: 28
End: 2023-05-08
Payer: COMMERCIAL

## 2023-05-08 LAB — SCANNED LAB RESULT: NORMAL

## 2023-05-08 NOTE — TELEPHONE ENCOUNTER
LM for Rosibel regarding her low risk NIPT results. Results indicate a low risk for fetal aneuploidy involving chromosomes 21, 18, 13, or sex chromosomes (XX). Fetal sex (female) was disclosed per patient wishes. This puts her current pregnancy at low risk for Down syndrome, trisomy 18, trisomy 13 and sex chromosome abnormalities. Although these results are reassuring, this does not replace a standard chromosome analysis from a chorionic villus sampling or amniocentesis.     Her results are available in her Epic chart for her primary OB to review.    Mimi Julio MS, [facility]  Maternal Fetal Medicine  [phone]

## 2023-05-15 ENCOUNTER — PRENATAL OFFICE VISIT (OUTPATIENT)
Dept: OBGYN | Facility: CLINIC | Age: 28
End: 2023-05-15
Payer: COMMERCIAL

## 2023-05-15 VITALS
HEART RATE: 89 BPM | DIASTOLIC BLOOD PRESSURE: 72 MMHG | WEIGHT: 187 LBS | TEMPERATURE: 98.1 F | BODY MASS INDEX: 33.13 KG/M2 | HEIGHT: 63 IN | SYSTOLIC BLOOD PRESSURE: 115 MMHG | RESPIRATION RATE: 18 BRPM

## 2023-05-15 DIAGNOSIS — Z34.82 PRENATAL CARE, SUBSEQUENT PREGNANCY, SECOND TRIMESTER: Primary | ICD-10-CM

## 2023-05-15 LAB
ERYTHROCYTE [DISTWIDTH] IN BLOOD BY AUTOMATED COUNT: 12.4 % (ref 10–15)
GLUCOSE 1H P 50 G GLC PO SERPL-MCNC: 132 MG/DL (ref 70–129)
HCT VFR BLD AUTO: 35.7 % (ref 35–47)
HGB BLD-MCNC: 11.9 G/DL (ref 11.7–15.7)
MCH RBC QN AUTO: 30.8 PG (ref 26.5–33)
MCHC RBC AUTO-ENTMCNC: 33.3 G/DL (ref 31.5–36.5)
MCV RBC AUTO: 93 FL (ref 78–100)
PLATELET # BLD AUTO: 216 10E3/UL (ref 150–450)
RBC # BLD AUTO: 3.86 10E6/UL (ref 3.8–5.2)
WBC # BLD AUTO: 11.9 10E3/UL (ref 4–11)

## 2023-05-15 PROCEDURE — 85027 COMPLETE CBC AUTOMATED: CPT | Performed by: OBSTETRICS & GYNECOLOGY

## 2023-05-15 PROCEDURE — 36415 COLL VENOUS BLD VENIPUNCTURE: CPT | Performed by: OBSTETRICS & GYNECOLOGY

## 2023-05-15 PROCEDURE — 86780 TREPONEMA PALLIDUM: CPT | Performed by: OBSTETRICS & GYNECOLOGY

## 2023-05-15 PROCEDURE — 99207 PR PRENATAL VISIT: CPT | Performed by: OBSTETRICS & GYNECOLOGY

## 2023-05-15 PROCEDURE — 82950 GLUCOSE TEST: CPT | Performed by: OBSTETRICS & GYNECOLOGY

## 2023-05-15 NOTE — NURSING NOTE
"Initial /72 (BP Location: Right arm, Patient Position: Chair, Cuff Size: Adult Regular)   Pulse 89   Temp 98.1  F (36.7  C) (Tympanic)   Resp 18   Ht 1.588 m (5' 2.5\")   Wt 84.8 kg (187 lb)   LMP 11/21/2022   BMI 33.66 kg/m   Estimated body mass index is 33.66 kg/m  as calculated from the following:    Height as of this encounter: 1.588 m (5' 2.5\").    Weight as of this encounter: 84.8 kg (187 lb). .      "

## 2023-05-15 NOTE — LETTER
May 22, 2023      Rosibel Saeed  [address]   [place] [address]        Dear Rosibel,   We have been trying to reach you to give you your recent test results and recommendations. Please call the clinic for these results and recommendations. [phone] Opt 2,1,2          Sincerely,        Luisana Caba, DO

## 2023-05-15 NOTE — PATIENT INSTRUCTIONS
Blood Glucose Screening During Pregnancy  Gestational diabetes is diabetes that only pregnant women get. Changes in your body during pregnancy can cause high blood sugar (glucose). This can cause problems for you and your baby. It is a serious condition. But it can be controlled.     Your healthcare provider will talk with you about blood glucose screening.     Who is at risk for gestational diabetes?  You are at risk of getting gestational diabetes if any of the risk factors below apply to you. The risk for this condition gets higher as your number of risk factors increases:    You are , , , , or .    You weigh more than your healthcare provider says is healthy for you.    You have a relative with diabetes.    You are older than 25.    You had gestational diabetes during a past pregnancy.    You had a stillbirth or a very large baby before.    You have a history of abnormal glucose tolerance.    You have sugar in your urine at the first prenatal visit.    You have metabolic syndrome, PCOS (polycystic ovary syndrome), are using glucocorticoids, or have high blood pressure.    You are pregnant with twins or more  What happens during a screening?  Here is what to expect during a blood glucose screening:    There is conflicting advice for screening. But the American College of Obstetricians and Gynecologists currently advises that all pregnant women be screened for gestational diabetes. When you are screened depends on your risk. Women are tested at 24 to 28 weeks of pregnancy. Women at high risk may be tested when they first learn they are pregnant.    To do the screening, a blood sample is taken. Your blood sugar level is measured.    If the results show a high blood sugar level, a glucose tolerance test may be ordered. You will drink a certain amount of sugar. This test measures how long it takes for sugar to leave your blood. The test will show if you  have gestational diabetes.  What to know if you test positive  Here are some things you need to know:    Gestational diabetes can be treated. The best way to control it is to find out you have it early and start treatment quickly.    This condition can cause problems for the mother during pregnancy. It can also cause problems with the baby during pregnancy, delivery, and after. Treatment greatly lowers the chance for problems.    The changes in your body that cause gestational diabetes normally happen only when you are pregnant. After the baby is born, your body goes back to normal. The condition goes away. But you may be more likely to have type 2 diabetes later. Talk with your healthcare provider about ways to help prevent type 2 diabetes.  Treating gestational diabetes  Here is how to treat gestational diabetes:    You ll need to check your blood sugar often. You can do this at home. Prick your finger and check a drop of blood on a glucose monitor. Your healthcare provider will show you how and when to check your blood sugar. They will talk about your target blood sugar level.    To manage your blood sugar, you will be given a special plan. It will likely include meal planning and getting regular exercise. Some women need to take a hormone called insulin. Others may take medicine to help control their blood sugar.  [employer] last reviewed this educational content on 8/1/2020 2000-2022 [employer]. All rights reserved. This information is not intended as a substitute for professional medical care. Always follow your healthcare professional's instructions.          Tdap (Tetanus, Diphtheria, Pertussis) Vaccine: What You Need to Know    This is a Vaccine Information Statement from the CDC.   Many vaccine information statements are available in [place] and other languages. See www.immunize.org/vis   Hojas de información sobre vacunas están disponibles en español y en muchos otros idiomas. Visite  www.immunize.org/vis   1. Why get vaccinated?   Tdap vaccine can prevent tetanus, diphtheria, and pertussis.   Diphtheria and pertussis spread from person to person. Tetanus enters the body through cuts or wounds.     TETANUS (T) causes painful stiffening of the muscles. Tetanus can lead to serious health problems, including being unable to open the mouth, having trouble swallowing and breathing, or death.    DIPHTHERIA (D) can lead to difficulty breathing, heart failure, paralysis, or death.    PERTUSSIS (aP), also known as  whooping cough,  can cause uncontrollable, violent coughing that makes it hard to breathe, eat, or drink. Pertussis can be extremely serious especially in babies and young children, causing pneumonia, convulsions, brain damage, or death. In teens and adults, it can cause weight loss, loss of bladder control, passing out, and rib fractures from severe coughing.  2. Tdap vaccine   Tdap is only for children 7 years and older, adolescents, and adults.   Adolescents should receive a single dose of Tdap, preferably at age 11 or 12 years.   Pregnant people should get a dose of Tdap during every pregnancy, preferably during the early part of the third trimester, to help protect the  from pertussis. Infants are most at risk for severe, life-threatening complications from pertussis.   Adults who have never received Tdap should get a dose of Tdap.   Also, adults should receive a booster dose of either Tdap or Td (a different vaccine that protects against tetanus and diphtheria but not pertussis) every 10 years , or after 5 years in the case of a severe or dirty wound or burn.   Tdap may be given at the same time as other vaccines.   3. Talk with your health care provider  Tell your vaccination provider if the person getting the vaccine:     Has had an allergic reaction after a previous dose of any vaccine that protects against tetanus, diphtheria, or pertussis , or has any severe, life-threatening  allergies    Has had a coma, decreased level of consciousness, or prolonged seizures within 7 days after a previous dose of any pertussis vaccine (DTP, DTaP, or Tdap)    Has seizures or another nervous system problem    Has ever had Guillain-Barré Syndrome (also called  GBS )    Has had severe pain or swelling after a previous dose of any vaccine that protects against tetanus or diphtheria  In some cases, your health care provider may decide to postpone Tdap vaccination until a future visit.   People with minor illnesses, such as a cold, may be vaccinated. People who are moderately or severely ill should usually wait until they recover before getting Tdap vaccine.   Your health care provider can give you more information.  4. Risks of a vaccine reaction     Pain, redness, or swelling where the shot was given, mild fever, headache, feeling tired, and nausea, vomiting, diarrhea, or stomachache sometimes happen after Tdap vaccination.  People sometimes faint after medical procedures, including vaccination. Tell your provider if you feel dizzy or have vision changes or ringing in the ears.   As with any medicine, there is a very remote chance of a vaccine causing a severe allergic reaction, other serious injury, or death.   5. What if there is a serious problem?  An allergic reaction could occur after the vaccinated person leaves the clinic. If you see signs of a severe allergic reaction (hives, swelling of the face and throat, difficulty breathing, a fast heartbeat, dizziness, or weakness), call 9-1-1 and get the person to the nearest hospital.   For other signs that concern you, call your health care provider.   Adverse reactions should be reported to the Vaccine Adverse Event Reporting System (VAERS). Your health care provider will usually file this report, or you can do it yourself. Visit the VAERS website at www.vaers.hhs.gov or call 1-847.551.2970. VAERS is only for reporting reactions, and VAERS staff members do  not give medical advice.   6. The National Vaccine Injury Compensation Program  The National Vaccine Injury Compensation Program (VICP) is a federal program that was created to compensate people who may have been injured by certain vaccines. Claims regarding alleged injury or death due to vaccination have a time limit for filing, which may be as short as two years. Visit the VICP website at www.hrsa.gov/vaccinecompensation or call 1-226.863.1271 to learn about the program and about filing a claim.   7. How can I learn more?     Ask your health care provider.    Call your local or state health department.    Visit the website of the Food and Drug Administration (FDA) for vaccine package inserts and additional information at www.fda.gov/vaccines-blood-biologics/vaccines.  ? Contact the Centers for Disease Control and Prevention (CDC): Call 1-506.883.8712 ( 9-124-QBN-INFO) or  ? Visit CDC s website at www.cdc.gov/vaccines.  Vaccine Information Statement   Tdap (Tetanus, Diphtheria, Pertussis) Vaccine  42 U.S.C.   [id number]  8/6/2021  [employer] last reviewed this educational content on     2950-1597 [employer]. All rights reserved. This information is not intended as a substitute for professional medical care. Always follow your healthcare professional's instructions.        You have been provided the My Labor and Birth Wishes document.  Please review at home and bring to your next prenatal visit. Bring this sheet to the hospital for your birth. Give copies to your care team members and support person.   Additional copies can be found here:  [email or web address]

## 2023-05-15 NOTE — PROGRESS NOTES
"Patient was given QR code to scan and watch \"Anesthetic options for pain relief during labor\" video.  "

## 2023-05-15 NOTE — PROGRESS NOTES
"[facility]    Return OB Note    CC: Return OB     Subjective:  Rosibel is a 28 year old  at 24w1d   Denies vaginal bleeding, loss of fluid, or regular contractions. Good fetal movement.  Complaints today: None    Objective:  /72 (BP Location: Right arm, Patient Position: Chair, Cuff Size: Adult Regular)   Pulse 89   Temp 98.1  F (36.7  C) (Tympanic)   Resp 18   Ht 1.588 m (5' 2.5\")   Wt 84.8 kg (187 lb)   LMP 2022   BMI 33.66 kg/m      Fundal height: 25cm  FHT: 150bpm    Assessment/Plan:   Encounter Diagnosis   Name Primary?     Prenatal care, subsequent pregnancy, second trimester Yes       IUP at 24w1d  -NOB labs normal  -Genetic screening: NIPT low risk  -Anatomy US: choroid plexus cyst and echogenic intracardiac focus. L2 CPC resolved, EIF present, genetic screening ordered and normal. No follow up needed.  -Mid trimester labs today  -Strict return precautions given    RTC 4 weeks    Luisana Caba DO"

## 2023-05-16 DIAGNOSIS — R73.09 ABNORMAL GLUCOSE TOLERANCE TEST: Primary | ICD-10-CM

## 2023-05-16 LAB — T PALLIDUM AB SER QL: NONREACTIVE

## 2023-05-18 NOTE — RESULT ENCOUNTER NOTE
Call to pt to notify of below.  Unable to reach.  Left message for pt to call back     Christal Lerner   [facility]  RN

## 2023-05-19 NOTE — RESULT ENCOUNTER NOTE
Call to pt to notify of below.  Unable to reach.  Left message for pt to call back.  Please send letter to patient asking patient to call clinic for test results and new recommendations from her physician.    Christal Lerner   [facility]  RN

## 2023-06-08 ENCOUNTER — LAB (OUTPATIENT)
Dept: LAB | Facility: CLINIC | Age: 28
End: 2023-06-08
Payer: COMMERCIAL

## 2023-06-08 DIAGNOSIS — R73.09 ABNORMAL GLUCOSE TOLERANCE TEST: ICD-10-CM

## 2023-06-08 LAB
GESTATIONAL GTT 1 HR POST DOSE: 151 MG/DL (ref 60–179)
GESTATIONAL GTT 2 HR POST DOSE: 104 MG/DL (ref 60–154)
GESTATIONAL GTT 3 HR POST DOSE: 118 MG/DL (ref 60–139)
GLUCOSE P FAST SERPL-MCNC: 81 MG/DL (ref 60–94)

## 2023-06-08 PROCEDURE — 82951 GLUCOSE TOLERANCE TEST (GTT): CPT

## 2023-06-08 PROCEDURE — 82952 GTT-ADDED SAMPLES: CPT

## 2023-06-08 PROCEDURE — 36415 COLL VENOUS BLD VENIPUNCTURE: CPT

## 2023-06-09 NOTE — PATIENT INSTRUCTIONS
Kick Counts  It s normal to worry about your baby s health. One way you can know your baby s doing well is to record the baby s movements once a day. This is called a kick count.   Remember to take your kick count records to all your appointments with your healthcare provider.  How to count kicks    Time how long it takes you to feel 10 kicks, flutters, swishes, or rolls. Ideally, you want to feel at least 10 movements in 2 hours. You will likely feel 10 movements in less time than that.  Starting at 28 weeks, count your baby's movements daily. Follow your healthcare provider's instructions for kick counting. Here are tips for counting kicks:    Choose a time when the baby is active, such as after a meal.     Sit comfortably or lie on your side.     The first time the baby moves, write down the time.     Count each movement until the baby has moved  10 times. This can take from 20 minutes to 2 hours.     If you haven't felt 10 kicks by the end of the second hour, wait a few hours. Then try again.    Try to do it at the same time each day.  When to call your healthcare provider  Call your healthcare provider  right away if:    You do a couple sets of kick counts during the day and your baby moves fewer than 10 times in 2 hours.    Your baby moves much less often than on the days before.    You haven't felt your baby move all day.  [employer] last reviewed this educational content on 8/1/2020 2000-2022 [employer]. All rights reserved. This information is not intended as a substitute for professional medical care. Always follow your healthcare professional's instructions.        Counting Your Baby's Movements   Counting your unborn baby's movements will assure you of your baby's health.   The best time to count is when your baby is most active. Do it at the same time every day.  To keep track of your baby's movements, use the attached chart. Bring the chart with you to each clinic visit.  1. Do not  smoke for at least 2 hours before counting your baby's movements. (In fact, it's best to quit smoking if you're pregnant.)  2. Lie on your side. Put your hand on your baby.  3. Write the time you start counting movements.  4. Count the next 10 kicks, rolls, and other movements.  5. Write the time when your baby has finished 10 movements.  6. If you reach 10 movements within 2 hours, you're done for the day.  Call your care provider right away if:    You feel no movement for the first hour.    It takes more than 2 hours to feel 10 movements.    There's a change in the normal pattern of movements.  Your care provider's phone number is:   ________________________________________  The number to your [facility] center is:   ________________________________________     For informational purposes only. Not to replace the advice of your health care provider. Copyright   ,  [place] [employer] [facility]. All rights reserved. Clinically reviewed by Celia Liz RN, [facility]. [employer] [id number] - REV 10/21.       Understanding  Labor  Going into labor before week 37 of pregnancy is called  labor.  labor can cause your baby to be born too soon. This can lead to health problems for your baby.     Before labor, the cervix is thick and closed.      In  labor, the cervix begins to efface (thin) and dilate (open).     Symptoms of  labor  If you think you re having  labor, get medical help right away. Contractions alone don t mean you re in  labor. What matters more are changes in your cervix. The cervix is the opening at the lower end of the uterus. Symptoms of  labor include:    4 or more contractions per hour    Strong contractions    Constant menstrual-like cramping    Low-back pain    Mucous or bloody fluid from the vagina    Bleeding or spotting in the second or third trimester  Evaluating  labor  Your healthcare provider will try to find  out if you re in  labor or just having contractions. They may watch you for a few hours. You may have these tests:    Pelvic exam. This is to see if your cervix has effaced (thinned) and dilated (opened).    Uterine activity monitoring. This is used to detect contractions.    Fetal monitoring. This is done to check the health of your baby.    Ultrasound. This test looks at your baby s size and position.    Amniocentesis. This test checks how mature your baby s lungs are.  Caring for yourself at home  If you have  contractions, but your cervix is still thick and closed, your healthcare provider may tell you to:    Drink plenty of water.    Do fewer activities.    Rest in bed on your side.    Don't have intercourse or stimulate your nipples.  When to call your healthcare provider  Call your healthcare provider if you have any of these:    4 or more contractions per hour    Bag of water breaks    Bleeding or spotting  If you need hospital care   labor often means that you need hospital care. You may need complete bed rest. You may have an IV (intravenous) line in your arm or hand. This is to give you fluids. You may be given pills or injections. These are done to help prevent contractions. You may get a medicine called a corticosteroid. This is to help your baby s lungs mature more quickly.  Are you at risk?  Any pregnant woman can have  labor. It may start for no reason. But these risk factors can increase your chances:    Past  labor or early birth    Smoking, drug, or alcohol use in pregnancy    A multiple pregnancy (twins or more)    Problems with the shape of the uterus    Bleeding during the pregnancy  The dangers of  birth  A baby born too soon may have health problems. This is because the baby didn t have enough time to grow. Some of the risks for your baby include:    Not breastfeeding or feeding well    Having immature lungs    Bleeding in the brain    Death  Reaching  "term  Your goal is to get as close to term (week 37 or later) as you can before giving birth. The closer you get to term, the higher your chance of having a healthy baby. Work with your healthcare provider. Together, you can take steps that may keep you from giving birth too early.  José Luis last reviewed this educational content on 10/1/2021    7595-8749 [employer]. All rights reserved. This information is not intended as a substitute for professional medical care. Always follow your healthcare professional's instructions.          Using Nitrous Oxide During Labor  What is nitrous oxide?  Nitrous oxide is a mixture of 50% nitrous oxide gas and 50% oxygen that is inhaled through a mask. Nitrous oxide is better known for use in dental offices or before surgery to help patients relax. Most people know of it as \"laughing gas.\"   How do I use it during labor?  You hold your own mask and begin to inhale the gas mixture about 30 seconds before a contraction begins. Breathing before and during a contraction helps the gas work the best right at the peak of the contraction, providing the greatest relief. It may take 3 to 4 contractions to get used to the rhythm of breathing the nitrous oxide.   Does nitrous oxide have any side effects?  Some women have reported dizziness, feeling sleepy, dry mouth and nausea (feeling sick to your stomach) with use of nitrous oxide. These side effects often decrease over time. Medicine is available to help ease nausea if it is a concern for you.   Is any extra monitoring required for me or my baby?  No. Your monitoring will not need to change just because you are using nitrous oxide.   Can I still be out of bed and use nitrous oxide?  Yes. Women sometimes feel dizzy when using nitrous oxide. For this reason, you will begin using nitrous oxide while in a bed or chair. If you feel okay, you may get up and walk or use a birthing ball or stool. It will be important that you have " someone in the room close by for support.   Can I use nitrous oxide and have IV pain medicines at the same time?  No. The combination of narcotics (injectable pain medications) and nitrous oxide can slow your breathing, so it is not safe for them to be used together. You may use nitrous oxide before receiving an epidural or IV pain medication.  If I use nitrous oxide can I still get an epidural?  Yes. You may wish to use nitrous oxide until you are ready for an epidural. Nitrous oxide can be less effective than an epidural in reducing labor pain. If an epidural is part of your birth plan, it is important that you get your epidural while you are still able to sit for safe placement.   Are there reasons I couldn't use nitrous oxide?   Yes. You cannot use nitrous oxide if you:    Cannot hold your own face mask.    Have received a dose of narcotic in the past few hours (your nurse will discuss this with you).    Have a documented B12 deficiency.    Have one of a very few other rare medical conditions.  Can my labor support person help me with my nitrous oxide?  No! Only you can administer nitrous oxide to yourself. Part of the built-in safety of nitrous oxide is that you hold your own mask. If anyone in the room is found to be handling the nitrous oxide equipment other than you or your nurse, the nitrous oxide will be removed.  Can I use nitrous oxide with other procedures?   Yes. If nitrous oxide is right for you and your provider agrees, it may be used in these situations:    During the repair of a laceration or episiotomy    Manual removal of your placenta    Blood draws    IV placement  For informational purposes only. Not to replace the advice of your health care provider. Copyright   2019 [employer]. All rights reserved. Clinically reviewed by  [employer] [facility] Team. [employer] 211177 - Rev .          Pregnancy: Your Third Trimester Changes  As the baby grows, your body  changes, too. You may also see signs that your body is getting ready for labor. Be patient. Within a few more weeks, your baby will be born.   How you are changing  Your body is preparing for the birth of your baby. Some of the most common changes are listed below. If you have any questions or concerns, ask your healthcare provider:     You ll gain more weight from fluids, extra blood, and fat deposits.    Your breasts will grow as your body gets ready to feed the baby. They may be more tender. You may also notice a slight yellow or white discharge from the nipples.    Discharge from your vagina may increase. This is normal.    You might see some skin color changes on your forehead, cheeks, or nose. Most of these will go away after you deliver.  How your baby is growing  Month 7  Your baby can open and close their eyes and weighs around 4 pounds (1.8 kg). If born prematurely (too early), your baby would likely survive with special care.     Month 8  Your baby is building up body fat and weighs around 6 pounds (2.7 kg).    Month 9  Your baby weighs nearly 7 pounds (3.2 kg) and is about 19 to 21 inches long. In other words, any day now...     [employer] last reviewed this educational content on 9/1/2021 2000-2022 [employer]. All rights reserved. This information is not intended as a substitute for professional medical care. Always follow your healthcare professional's instructions.

## 2023-06-12 ENCOUNTER — PRENATAL OFFICE VISIT (OUTPATIENT)
Dept: OBGYN | Facility: CLINIC | Age: 28
End: 2023-06-12
Payer: COMMERCIAL

## 2023-06-12 VITALS
TEMPERATURE: 98.5 F | RESPIRATION RATE: 16 BRPM | WEIGHT: 192.9 LBS | HEIGHT: 63 IN | BODY MASS INDEX: 34.18 KG/M2 | DIASTOLIC BLOOD PRESSURE: 67 MMHG | HEART RATE: 98 BPM | SYSTOLIC BLOOD PRESSURE: 116 MMHG

## 2023-06-12 DIAGNOSIS — Z3A.28 28 WEEKS GESTATION OF PREGNANCY: Primary | ICD-10-CM

## 2023-06-12 PROCEDURE — 90471 IMMUNIZATION ADMIN: CPT | Performed by: OBSTETRICS & GYNECOLOGY

## 2023-06-12 PROCEDURE — 99207 PR PRENATAL VISIT: CPT | Performed by: OBSTETRICS & GYNECOLOGY

## 2023-06-12 PROCEDURE — 90715 TDAP VACCINE 7 YRS/> IM: CPT | Performed by: OBSTETRICS & GYNECOLOGY

## 2023-06-12 NOTE — PROGRESS NOTES
Prior to immunization administration, verified patients identity using patient s name and date of birth. Please see Immunization Activity for additional information.     Screening Questionnaire for Adult Immunization    Are you sick today?   No   Do you have allergies to medications, food, a vaccine component or latex?   No   Have you ever had a serious reaction after receiving a vaccination?   No   Do you have a long-term health problem with heart, lung, kidney, or metabolic disease (e.g., diabetes), asthma, a blood disorder, no spleen, complement component deficiency, a cochlear implant, or a spinal fluid leak?  Are you on long-term aspirin therapy?   No   Do you have cancer, leukemia, HIV/AIDS, or any other immune system problem?   No   Do you have a parent, brother, or sister with an immune system problem?   No   In the past 3 months, have you taken medications that affect  your immune system, such as prednisone, other steroids, or anticancer drugs; drugs for the treatment of rheumatoid arthritis, Crohn s disease, or psoriasis; or have you had radiation treatments?   No   Have you had a seizure, or a brain or other nervous system problem?   No   During the past year, have you received a transfusion of blood or blood    products, or been given immune (gamma) globulin or antiviral drug?   No   For women: Are you pregnant or is there a chance you could become       pregnant during the next month? yes   Have you received any vaccinations in the past 4 weeks?   No     Immunization questionnaire answers were all negative.      Injection of TDAP given by Iesha Byrd. Patient instructed to remain in clinic for 15 minutes afterwards, and to report any adverse reactions.     Screening performed by Iesha Byrd on 6/12/2023 at 3:36 PM.

## 2023-06-12 NOTE — PROGRESS NOTES
"[facility]    Return OB Note    CC: Return OB     Subjective:  Rosibel is a 28 year old  at 28w1d   Denies vaginal bleeding, loss of fluid, or regular contractions. Pos fetal movement. No headache, visual disturbance or RUQ pain.  Complaints today: none    Objective:  /67 (BP Location: Right arm, Patient Position: Chair, Cuff Size: Adult Regular)   Pulse 98   Temp 98.5  F (36.9  C) (Tympanic)   Resp 16   Ht 1.588 m (5' 2.5\")   Wt 87.5 kg (192 lb 14.4 oz)   LMP 2022   BMI 34.72 kg/m      See flowsheet      Assessment/Plan:       28 year old year old  female with IUP at 28w1d  Encounter Diagnosis   Name Primary?     28 weeks gestation of pregnancy Yes          -NOB labs normal  -Genetic screening: NIPT low risk  -Anatomy US: choroid plexus cyst and echogenic intracardiac focus. L2 CPC resolved, EIF present, genetic screening ordered and normal. No follow up needed.  -Mid trimester labs- failed 1 hour GTT, passed 3 hr GTT  -Tdap done today.    Return precautions given  Discussed  labor and preeclampsia precautions.  Discussed fetal movement precautions.    RTC 2 weeks    Anali Swann MD  "

## 2023-06-12 NOTE — NURSING NOTE
"Initial /67 (BP Location: Right arm, Patient Position: Chair, Cuff Size: Adult Regular)   Pulse 98   Temp 98.5  F (36.9  C) (Tympanic)   Resp 16   Ht 1.588 m (5' 2.5\")   Wt 87.5 kg (192 lb 14.4 oz)   LMP 11/21/2022   BMI 34.72 kg/m   Estimated body mass index is 34.72 kg/m  as calculated from the following:    Height as of this encounter: 1.588 m (5' 2.5\").    Weight as of this encounter: 87.5 kg (192 lb 14.4 oz). .    Iesha Byrd CMA    "

## 2023-06-22 NOTE — PATIENT INSTRUCTIONS
Birth Control Methods  Birth control methods are used to help prevent pregnancy. There are many different methods to choose from. Talk with your healthcare provider about which method is right for you. Be sure to ask your provider how well each one works. Also ask about the benefits, risks, and side effects of each method.   Hormones  Some birth control methods work by releasing hormones such as progestin and estrogen. These methods include hormone implants, hormone shots, the vaginal ring, the patch, and birth control pills. They all work by stopping the release of the egg from the ovary (ovulation). All of these methods work well and can be stopped at any time.     The implant is a small device that needs to be placed in the upper arm by a trained healthcare provider. It works for up to 3 years.    Hormone injections must be repeated every 3 months.    The vaginal ring must be replaced monthly. It can be removed during the fourth week of each cycle.    The patch must be replaced weekly. It's not worn during the fourth week of each cycle.    Birth control pills must be taken every day.  Intrauterine device (IUD)  An IUD is a small, T-shaped device. It must be placed in the uterus by a trained healthcare provider. There are different types of IUDs available. They work by causing changes in the uterus that make it harder for sperm to reach the egg. Depending on the type of IUD you have, it may work for several years or longer. The IUD is a reversible birth control method. This means it can be removed at any time.   Condom  A condom is a sheath that forms a thin barrier between the penis and the vagina. It helps prevent pregnancy by keeping sperm from entering the vagina. When latex condoms are used, they have the added benefit of protecting against most STIs (sexually transmitted infections). Condoms are used each time there is sexual intercourse and should be discarded after each use. Ask your healthcare  provider about the different types of condoms available. These include both the male condom and female condom.   Spermicide  Spermicides come as foams, jellies, creams, suppositories, and tablets.  They help prevent pregnancy by killing sperm. When used alone they are not that reliable. They work best when combined with other birth control methods such as diaphragms and cervical caps.   Sponge, diaphragm, and cervical cap   All of these methods help prevent pregnancy by covering the opening of the uterus (cervix). This prevents sperm from passing through.   The sponge contains spermicide. It can be bought over the counter. The sponge must be left in place for at least 6 hours after the last time you have sex. However, it should not stay in place for more than 24 hours. Discard after use.   The diaphragm and cervical cap must be fitted and prescribed by your healthcare provider. Both are used with spermicide. The diaphragm must be left in place for at least 6 hours after sex. However, it should not stay in place for more than 24 hours. It can be washed and reused. The cervical cap must be left in place for at least 6 hours after sex. However, it should not stay in place for more than 48 hours. It can be washed and reused.   Withdrawal method  This is when the man pulls his penis out of the vagina just before ejaculation ( coming ). This lowers the amount of sperm entering the vagina. Be aware that fluids released just before ejaculation often still contain some sperm, so this method is not as reliable as certain other methods.   Rhythm method   This method is also call natural family planning or fertility awareness. It requires that you know when in your menstrual cycle you are likely to become pregnant. Then you not have sex during those days. This requires careful planning and good discipline. Your healthcare provider can explain more about how this works.   Tubal ligation and vasectomy  These are surgical methods  to prevent pregnancy. Tubal ligation is an option for women. The fallopian tubes are blocked or cut (ligated). This keeps the egg from passing into the uterus or sperm from reaching the egg. Vasectomy is an option for men. The tubes that normally carry sperm to the penis are either closed or blocked. Both tubal ligation and vasectomy are permanent birth control methods. This means reversal is either not possible or unlikely to work. They are good choices for women and men who know that they don't want to have children in the future.   [employer] last reviewed this educational content on 7/1/2020 2000-2022 [employer]. All rights reserved. This information is not intended as a substitute for professional medical care. Always follow your healthcare professional's instructions.

## 2023-06-26 ENCOUNTER — PRENATAL OFFICE VISIT (OUTPATIENT)
Dept: OBGYN | Facility: CLINIC | Age: 28
End: 2023-06-26
Payer: COMMERCIAL

## 2023-06-26 VITALS
TEMPERATURE: 98.7 F | RESPIRATION RATE: 14 BRPM | BODY MASS INDEX: 35.3 KG/M2 | DIASTOLIC BLOOD PRESSURE: 67 MMHG | HEIGHT: 63 IN | HEART RATE: 85 BPM | SYSTOLIC BLOOD PRESSURE: 106 MMHG | WEIGHT: 199.2 LBS

## 2023-06-26 DIAGNOSIS — Z34.93 PRENATAL CARE IN THIRD TRIMESTER: Primary | ICD-10-CM

## 2023-06-26 PROCEDURE — 99207 PR PRENATAL VISIT: CPT | Performed by: ADVANCED PRACTICE MIDWIFE

## 2023-06-26 NOTE — PROGRESS NOTES
"Initial /67 (BP Location: Left arm, Patient Position: Chair, Cuff Size: Adult Regular)   Pulse 85   Temp 98.7  F (37.1  C) (Tympanic)   Resp 14   Ht 1.588 m (5' 2.5\")   Wt 90.4 kg (199 lb 3.2 oz)   LMP 11/21/2022   BMI 35.85 kg/m   Estimated body mass index is 35.85 kg/m  as calculated from the following:    Height as of this encounter: 1.588 m (5' 2.5\").    Weight as of this encounter: 90.4 kg (199 lb 3.2 oz). .      "

## 2023-06-26 NOTE — PROGRESS NOTES
Feeling well.  Baby is active. Denies any leaking of fluid, vaginal bleeding, regular uterine contractions, or headaches or other concerns.  Reviewed to call for contractions, loss of fluid, vaginal bleeding, decreased fetal movement or any other questions or concerns.    RTC in 2 weeks.  Mecca Mcelroy, PILAR, APRN, CNM

## 2023-07-10 ENCOUNTER — PRENATAL OFFICE VISIT (OUTPATIENT)
Dept: OBGYN | Facility: CLINIC | Age: 28
End: 2023-07-10
Payer: COMMERCIAL

## 2023-07-10 VITALS
WEIGHT: 202 LBS | RESPIRATION RATE: 18 BRPM | HEART RATE: 86 BPM | BODY MASS INDEX: 35.79 KG/M2 | TEMPERATURE: 98.8 F | HEIGHT: 63 IN | SYSTOLIC BLOOD PRESSURE: 102 MMHG | DIASTOLIC BLOOD PRESSURE: 65 MMHG

## 2023-07-10 DIAGNOSIS — R73.09 ABNORMAL GLUCOSE TOLERANCE TEST: ICD-10-CM

## 2023-07-10 DIAGNOSIS — Z34.93 PRENATAL CARE IN THIRD TRIMESTER: Primary | ICD-10-CM

## 2023-07-10 PROCEDURE — 59025 FETAL NON-STRESS TEST: CPT | Performed by: STUDENT IN AN ORGANIZED HEALTH CARE EDUCATION/TRAINING PROGRAM

## 2023-07-10 PROCEDURE — 99207 PR PRENATAL VISIT: CPT | Performed by: STUDENT IN AN ORGANIZED HEALTH CARE EDUCATION/TRAINING PROGRAM

## 2023-07-10 RX ORDER — BREAST PUMP
1 EACH MISCELLANEOUS SEE ADMIN INSTRUCTIONS
Qty: 1 EACH | Refills: 0 | Status: SHIPPED | OUTPATIENT
Start: 2023-07-10

## 2023-07-10 NOTE — PROGRESS NOTES
Patient came into clinic for a prenatal visit appointment. Instructed to put patient on NST Monitor for 20 min.     Strip removed from machine at 20 min and shown to Dr. Stephenson.     Instructed per Dr. Stephenson to take patient off NST and send patient home    NST strip handed to Dr. Stephenson to be reviewed and documented.    Margie Aguilar cma

## 2023-07-10 NOTE — NURSING NOTE
"Initial /65 (BP Location: Left arm, Patient Position: Chair, Cuff Size: Adult Regular)   Pulse 86   Temp 98.8  F (37.1  C) (Tympanic)   Resp 18   Ht 1.588 m (5' 2.5\")   Wt 91.6 kg (202 lb)   LMP 11/21/2022   BMI 36.36 kg/m   Estimated body mass index is 36.36 kg/m  as calculated from the following:    Height as of this encounter: 1.588 m (5' 2.5\").    Weight as of this encounter: 91.6 kg (202 lb). .    "

## 2023-07-10 NOTE — PROGRESS NOTES
"[facility]  Return OB Note    Subjective:  Denies vaginal bleeding, loss of fluid, or regular contractions. Noted normal fetal movement.  Complaints today: none    Objective:  /65 (BP Location: Left arm, Patient Position: Chair, Cuff Size: Adult Regular)   Pulse 86   Temp 98.8  F (37.1  C) (Tympanic)   Resp 18   Ht 1.588 m (5' 2.5\")   Wt 91.6 kg (202 lb)   LMP 2022   BMI 36.36 kg/m      Fundal height: 31cm  NST: baseline 115bpm, moderate variability, x2 [id number] present, no decels  Tocometer: no contractions    Assessment/Plan:   Rosibel Saeed is a 28 year old  at 32w1d by 7w2d US, here for return OB visit.    -NOB labs normal. Mid trimester labs- failed 1 hour GTT, passed 3 hr GTT  -Genetic screening: NIPT low risk  -Anatomy US: choroid plexus cyst and echogenic intracardiac focus. L2 CPC resolved, EIF present, genetic screening ordered and normal. No follow up needed.  -S/p Tdap vaccine  -Possible fetal heart rate deceleration noted during doptone: NST obtained and reactive.  -Breast pump prescription given    RTC 2 weeks    Seema Stephenson MD  Obstetrics and Gynecology  [facility]   07/10/2023             "

## 2023-07-10 NOTE — PATIENT INSTRUCTIONS
"Search \"[facility]\" on [employer] for preparing for labor information, pain/aches in pregnancy.    Search \"new little life by [employer]if\" on [employer] for reviews of different breat pump. If [employer] equipment supply has the pump that you desire, I would recommend getting a pump with us.  Otherwise, you can purchase the pump through online via [employer] or [employer] breast pump.    Search \"[place] breast-feeding\" on [employer] for more breast-feeding information.    To encourage the onset of labor at home:  36 weeks:   - 70-80g of Dates at 36 weeks  37 weeks  ? EVENING PRIMROSE  mg po TID until delivery  38 weeks  ? Breast stimulation 15-20 minutes 3 times a day  ? Acupuncture with electro stim  ? Exercise 30 minutes, 3 times a week  ? Membrane sweep weekly  39 weeks and beyond  ? Castor Oil 60ml in 200ml warm water (60 ml single dose in 200 ml warm water or orange juice)     "

## 2023-07-24 ENCOUNTER — PRENATAL OFFICE VISIT (OUTPATIENT)
Dept: OBGYN | Facility: CLINIC | Age: 28
End: 2023-07-24
Payer: COMMERCIAL

## 2023-07-24 VITALS
HEART RATE: 88 BPM | RESPIRATION RATE: 18 BRPM | DIASTOLIC BLOOD PRESSURE: 78 MMHG | SYSTOLIC BLOOD PRESSURE: 107 MMHG | TEMPERATURE: 98.1 F | BODY MASS INDEX: 35.44 KG/M2 | WEIGHT: 200 LBS | HEIGHT: 63 IN

## 2023-07-24 DIAGNOSIS — Z34.83 PRENATAL CARE, SUBSEQUENT PREGNANCY IN THIRD TRIMESTER: Primary | ICD-10-CM

## 2023-07-24 PROCEDURE — 99207 PR PRENATAL VISIT: CPT | Performed by: OBSTETRICS & GYNECOLOGY

## 2023-07-24 NOTE — NURSING NOTE
"Initial /78 (BP Location: Left arm, Patient Position: Chair, Cuff Size: Adult Regular)   Pulse 88   Temp 98.1  F (36.7  C) (Tympanic)   Resp 18   Ht 1.588 m (5' 2.5\")   Wt 90.7 kg (200 lb)   LMP 11/21/2022   BMI 36.00 kg/m   Estimated body mass index is 36 kg/m  as calculated from the following:    Height as of this encounter: 1.588 m (5' 2.5\").    Weight as of this encounter: 90.7 kg (200 lb). .    "

## 2023-07-24 NOTE — PATIENT INSTRUCTIONS
Weeks 34 to 36 of Your Pregnancy: Care Instructions  Your belly has grown quite large. It's almost time to give birth! Your baby's lungs are almost ready to breathe air. The skull bones are firm enough to protect your baby's head. But they're soft enough to move down through the birth canal.    You might be wondering what to expect during labor. Because each birth is different, there's no way to know exactly what childbirth will be like for you. Talk to your doctor or midwife about any concerns you have.   You'll probably have a test for group B streptococcus (GBS). GBS is bacteria that can live in the vagina and rectum. GBS can make your baby sick after birth. If you test positive, you'll get antibiotics during labor.     Choose what type of pain relief you would like during labor.  You can choose from a few types, including medicine and non-medicine options. You may want to use several types of pain relief.     Know how medicines can help with pain during labor.  Some medicines lower anxiety and help with some of the pain. Others make your lower body numb so that you will feel less pain.     Tell your doctor about your pain medicine choice.  Do this before you start labor or very early in your labor. You may be able to change your mind during labor.     Learn about the stages of labor.    The first stage includes the early (latent) and active phases of labor. Contractions start in early labor. During active labor, contractions get stronger, last longer, and happen more often. And the cervix opens more rapidly.  The second stage starts when you're ready to push. During this stage, your baby is born.  During the third stage, you'll usually have a few more contractions to push out the placenta.   Follow-up care is a key part of your treatment and safety. Be sure to make and go to all appointments, and call your doctor if you are having problems. It's also a good idea to know your test results and keep a list of the  "medicines you take.  Where can you learn more?  Go to https://www.[employer].net/patiented  Enter B912 in the search box to learn more about \"Weeks 34 to 36 of Your Pregnancy: Care Instructions.\"  Current as of: 2022               Content Version: 13.7    5998-6303 [employer].   Care instructions adapted under license by your healthcare professional. If you have questions about a medical condition or this instruction, always ask your healthcare professional. [employer] disclaims any warranty or liability for your use of this information.      Group B Strep During Pregnancy: Care Instructions  Overview     Group B strep infection is caused by a type of bacteria. It's a different kind of bacteria than the kind that causes strep throat.  You may have this kind of bacteria in your body. Sometimes it may cause an infection, but most of the time it doesn't make you sick or cause symptoms. But if you pass the bacteria to your baby during the birth, it can cause serious health problems for your baby.  If you have this bacteria in your body, you will get antibiotics when you are in labor. Antibiotics help prevent problems for a  baby.  After birth, doctors will watch and may test your baby. If your baby tests positive for Group B strep, your baby will get antibiotics.  If you plan to breastfeed your baby, don't worry. It will be safe to breastfeed.  Follow-up care is a key part of your treatment and safety. Be sure to make and go to all appointments, and call your doctor if you are having problems. It's also a good idea to know your test results and keep a list of the medicines you take.  How can you care for yourself at home?  If your doctor has prescribed antibiotics, take them as directed. Do not stop taking them just because you feel better. You need to take the full course of antibiotics.  Tell your doctor if you are allergic to any antibiotic.  If you go into labor, or " "your water breaks, go to the hospital. Your doctor will give you antibiotics to help protect your baby from infection.  Tell the doctors and nurses if you have an allergy to penicillin.  Tell the doctors and nurses at the hospital that you tested positive for group B strep.  When should you call for help?   Call your doctor now or seek immediate medical care if:    You have symptoms of a urinary tract infection. These may include:  Pain or burning when you urinate.  A frequent need to urinate without being able to pass much urine.  Pain in the flank, which is just below the rib cage and above the waist on either side of the back.  Blood in your urine.  A fever.     You think you are in labor or your water has broken.     You have pain in your belly or pelvis.   Watch closely for changes in your health, and be sure to contact your doctor if you have any problems.  Where can you learn more?  Go to https://www.[employer]/patiented  Enter M001 in the search box to learn more about \"Group B Strep During Pregnancy: Care Instructions.\"  Current as of: October 31, 2022               Content Version: 13.7    9935-6146 [employer].   Care instructions adapted under license by your healthcare professional. If you have questions about a medical condition or this instruction, always ask your healthcare professional. [employer] disclaims any warranty or liability for your use of this information.      Circumcision in Infants: What to Expect at Home  Your Child's Recovery  After circumcision, your baby's penis may look red and swollen. It may have petroleum jelly and gauze on it. The gauze will likely come off when your baby urinates. Follow your doctor's directions about whether to put clean gauze back on your baby's penis or to leave the gauze off. If you need to remove gauze from the penis, use warm water to soak the gauze and gently loosen it.  The doctor may have used a Plastibell device to do " the circumcision. If so, your baby will have a plastic ring around the head of the penis. The ring should fall off by itself in 10 to 12 days.  A thin, yellow film may form over the area the day after the procedure. This is part of the normal healing process. It should go away in a few days.  Your baby may seem fussy while the area heals. It may hurt for your baby to urinate. This pain often gets better in 3 or 4 days. But it may last for up to 2 weeks.  Even though your baby's penis will likely start to feel better after 3 or 4 days, it may look worse. The penis often starts to look like it's getting better after about 7 to 10 days.  This care sheet gives you a general idea about how long it will take for your child to recover. But each child recovers at a different pace. Follow the steps below to help your child get better as quickly as possible.  How can you care for your child at home?  Activity    Let your baby rest as much as possible. Sleeping will help with recovery.     You can give your baby a sponge bath the day after surgery. Ask your doctor when it is okay to give your baby a bath.   Medicines    Your doctor will tell you if and when your child can restart any medicines. The doctor will also give you instructions about your child taking any new medicines.     Your doctor may recommend giving your baby acetaminophen (Tylenol) to help with pain after the procedure. Be safe with medicines. Give your child medicines exactly as prescribed. Call your doctor if you think your child is having a problem with a medicine.     Do not give your child two or more pain medicines at the same time unless the doctor told you to. Many pain medicines have acetaminophen, which is Tylenol. Too much acetaminophen (Tylenol) can be harmful.   Circumcision care    Always wash your hands before and after touching the circumcision area.     Gently wash your baby's penis with plain, warm water after each diaper change, and pat it  "dry. Do not use soap. Don't use hydrogen peroxide or alcohol. They can slow healing.     Do not try to remove the film that forms on the penis. The film will go away on its own.     Put plenty of petroleum jelly (such as Vaseline) on the circumcision area during each diaper change. This will prevent your baby's penis from sticking to the diaper while it heals.     Fasten your baby's diapers loosely so that there is less pressure on the penis while it heals.   Follow-up care is a key part of your child's treatment and safety. Be sure to make and go to all appointments, and call your doctor if your child is having problems. It's also a good idea to know your child's test results and keep a list of the medicines your child takes.  When should you call for help?   Call your doctor now or seek immediate medical care if:    Your baby has a fever over 100.4 F.     Your baby is extremely fussy or irritable, has a high-pitched cry, or refuses to eat.     Your baby does not have a wet diaper within 12 hours after the circumcision.     You find a spot of bleeding larger than a 2-inch [place] from the incision.     Your baby has signs of infection. Signs may include severe swelling; redness; a red streak on the shaft of the penis; or a thick, yellow discharge.   Watch closely for changes in your child's health, and be sure to contact your doctor if:    A Plastibell device was used for the circumcision and the ring has not fallen off after 10 to 12 days.   Where can you learn more?  Go to https://www.[employer].net/patiented  Enter S255 in the search box to learn more about \"Circumcision in Infants: What to Expect at Home.\"  Current as of: March 1, 2023               Content Version: 13.7    1847-9411 [employer].   Care instructions adapted under license by your healthcare professional. If you have questions about a medical condition or this instruction, always ask your healthcare professional. [employer], " [facility] disclaims any warranty or liability for your use of this information.        The Benefits of Breastmilk  Breastmilk is the best food for your baby. It has just the right amount of nutrients. It protects your baby's digestive system. It protects other body systems in your baby, and it helps your baby grow and develop.       Healthiest for baby  Breastmilk is the ideal food for babies. It has all the nutrients your baby needs to grow healthy and strong.    Breastmilk has these benefits:  It lowers the risk for sudden infant death syndrome (SIDS).  It gives babies a lower risk for ear infections in their first year. This is compared to babies who are fed formula.  It has DHA. This is a type of fat. It helps your baby s growing brain, nervous system, and eyes.  It is full of antibodies. These help your baby fight infection.  It lowers your baby's risk for lung illness.  It lowers the risk of diarrhea.  It lowers your baby s risk for allergies. Babies fed formula are more likely to have an allergy to cow's milk.  It lowers your baby s risk for colds and many other diseases.  It changes as your baby grows. This meets your baby's changing needs.  And it s important to know that:  Giving only breastmilk for the first 6 months gives your baby more of these benefits.  Giving breastmilk plus solid food from 6 months to 1 year or more gives more benefits.   babies have fewer long-term health problems when they grow up. These problems include diabetes and obesity.  Breastfeeding gives contact that your baby loves. Spending time skin-to-skin with you is calming and comforting.  Healthiest for mom  For many people, breastfeeding is a good experience. It creates a strong bond between mother and baby. People who breastfeed also get health benefits. Some benefits for you include:   You can know that your baby is growing healthy and strong because of your milk.  Breastmilk is convenient. It's free and clean. It's  always at the right temperature.  Breastfeeding burns calories. This can help you lose pregnancy weight faster.  Breastfeeding releases hormones that contract the uterus. This helps the uterus return to its normal size after childbirth.  Mothers who breastfeed have a lower risk for ovarian and breast cancers.  Some studies have found that breastfeeding may reduce a person's risk for type 2 diabetes and rheumatoid arthritis. It may reduce the risk of cardiovascular disease. This includes high blood pressure and high cholesterol.  Breastfeeding every day delays the return of your menstrual period. This can help extend the time between pregnancies.  Many people can help you learn to breastfeed. A lactation consultant can help. This is a healthcare provider who is trained to help you breastfeed. Your nurse, midwife, nurse practitioner, obstetrician, pediatrician, or family practice healthcare provider can also help you learn about breastfeeding.   What does it mean to give only breastmilk?   Giving only breastmilk for at least the first 6 months of life is best for your baby. If you need to be away from your baby, you can express breastmilk. This means pumping milk from your breast into a container. Talk with your healthcare provider about the best ways to feed this milk to your baby.    You should not give your baby water, sugar water, formula, or solids during their first 6 months unless your baby's healthcare provider tells you to.   Your baby s provider may tell you to give your baby vitamins, minerals, or medicines.  babies should be given vitamin D supplements. The provider will tell you the type and amount of vitamin D to give your baby.   What are the risks of not giving only breastmilk?   You now know the many of the benefits of breastfeeding. But you might not know why it's important to give only breastmilk for at least 6 months.   Your baby gets the best protection against health problems when they  get only breastmilk. Breastfeeding some of the time is good. But breastfeeding all of the time is best.   Giving your baby formula or other liquids may cause you to:  Have more problems breastfeeding  Make less milk  Be less confident in breastfeeding  Breastfeed less often  Stop breastfeeding before your baby is at least 12 months old                                                     When other choices may be needed  Giving only breastmilk is almost always the best thing to do. But your healthcare provider may have reasons to advise giving your baby formula or other liquids. They include:   Your baby has a health problem. There are cases where you may need to add formula or other liquids. This is often only for a short time. This may be the case if your baby has low blood sugar (hypoglycemia), loses body fluids (dehydration), or has high levels of bilirubin.  You have certain health problems. Some infections can be passed from your skin to your baby's skin. Or it can pass through your breastmilk. People with HIV/AIDS or untreated and contagious TB (tuberculosis) should not breastfeed. Women with active skin sores from chickenpox (varicella) can pump their breastmilk and feed their baby. But they should keep their baby s skin from touching any of the sores.  You use illegal drugs or drink alcohol. People who use illegal drugs should not breastfeed. If you are going to have a drink that has alcohol, it's best to do so just after you nurse or pump milk. Breastfeeding or pumping breast milk is OK at least 4 hours after your last drink. That way, your body will have some time to get rid of the alcohol before the next feeding and less of it will reach your baby. Long-term exposure to alcohol in breastmilk may affect your baby's health. It may also cause you to make less milk.  You take certain medicines. If you take any medicines, ask your baby s healthcare provider if you can breastfeed.  José Luis last reviewed this  educational content on 2022-2023 [employer]. All rights reserved. This information is not intended as a substitute for professional medical care. Always follow your healthcare professional's instructions.          What Is Group B Strep?  Group B strep (streptococcus) is a common type of bacteria. It can grow in the vagina, rectum, or urinary tract. It most often does not cause harm in adults. But in rare cases, a pregnant person who has group B strep can infect the baby during birth. This can cause serious illness in the . But treatment during labor reduces the risk of the baby becoming infected. And if a  gets group B strep, the infection can be treated.     Facts about group B strep   Learning more about group B strep can help you understand how testing and treatment can help. Here are some basic facts about group B strep:   It's not a sexually transmitted infection.  It's not the same as strep throat. (That is caused by group A strep.)  It often has no symptoms. It may cause no problems in adults.  Test results can be misleading. They may be negative one week and positive the next week.  Group B strep can be spread to the baby during vaginal delivery. It can't be passed during  (surgical) birth.  A person with group B strep rarely infects the . (Infection happens only about 1% to 2% of the time.)  When a person is treated during labor and delivery, the baby almost never becomes infected.  Certain factors during pregnancy increase the risk of a baby becoming infected.  Possible effects on your baby   Group B strep can infect the blood. It can also cause inflammation of the baby s lungs, brain, or spinal cord. Long-term effects can include blindness, deafness, mental retardation, or cerebral palsy. And in rare cases, infection causes death. Infection is most often found soon after the baby is born.   How your baby may become infected   Group B strep often  lives in the vagina or rectum. If the amniotic sac breaks early, bacteria from the vagina can travel to the uterus, reaching the baby. Or, while passing through the birth canal, the baby can come in contact with the bacteria. In rare cases, group B strep can be passed to the baby after delivery. This is called late-onset group B strep. The source of this type of infection is not well-understood. But some experts believe that it happens if the baby is exposed to group B strep in the home, from the parents or siblings, or in the community.   What increases the risk?   Certain things increase the chance that a baby will be infected. They include:  Breaking or leaking of the amniotic sac before 37 weeks of pregnancy  Labor before 37 weeks of pregnancy  Breaking of the amniotic sac more than 18 hours before labor starts  Fever during labor  A urinary tract infection with group B strep at any point in the pregnancy  Having a previous baby born with a group B strep infection  José Luis last reviewed this educational content on 6/1/2022 2000-2023 [employer]. All rights reserved. This information is not intended as a substitute for professional medical care. Always follow your healthcare professional's instructions.

## 2023-08-07 ENCOUNTER — PRENATAL OFFICE VISIT (OUTPATIENT)
Dept: OBGYN | Facility: CLINIC | Age: 28
End: 2023-08-07
Payer: COMMERCIAL

## 2023-08-07 VITALS
BODY MASS INDEX: 36.72 KG/M2 | SYSTOLIC BLOOD PRESSURE: 113 MMHG | TEMPERATURE: 97.9 F | WEIGHT: 204 LBS | DIASTOLIC BLOOD PRESSURE: 85 MMHG | HEART RATE: 75 BPM

## 2023-08-07 DIAGNOSIS — Z34.83 PRENATAL CARE, SUBSEQUENT PREGNANCY IN THIRD TRIMESTER: Primary | ICD-10-CM

## 2023-08-07 PROCEDURE — 87653 STREP B DNA AMP PROBE: CPT | Performed by: OBSTETRICS & GYNECOLOGY

## 2023-08-07 PROCEDURE — 99207 PR PRENATAL VISIT: CPT | Performed by: OBSTETRICS & GYNECOLOGY

## 2023-08-07 NOTE — NURSING NOTE
"Initial /85 (BP Location: Right arm, Patient Position: Chair, Cuff Size: Adult Large)   Pulse 75   Temp 97.9  F (36.6  C) (Tympanic)   Wt 92.5 kg (204 lb)   LMP 11/21/2022   BMI 36.72 kg/m   Estimated body mass index is 36.72 kg/m  as calculated from the following:    Height as of 7/24/23: 1.588 m (5' 2.5\").    Weight as of this encounter: 92.5 kg (204 lb). .        Chani Hayes MA    "

## 2023-08-07 NOTE — PROGRESS NOTES
[facility]     Return OB Note     CC: Return OB      Subjective:  Rosibel is a 28 year old  at 36w1d   Denies vaginal bleeding, loss of fluid, or regular contractions. Good fetal movement.  Complaints today: None     Objective:  /85 (BP Location: Right arm, Patient Position: Chair, Cuff Size: Adult Large)   Pulse 75   Temp 97.9  F (36.6  C) (Tympanic)   Wt 92.5 kg (204 lb)   LMP 2022   BMI 36.72 kg/m     Fundal height: 36cm  FHT: 140bpm  SVE /-2     Assessment/Plan:       IUP at 36w1d   -NOB labs normal. Mid trimester labs- failed 1 hour GTT, passed 3 hr GTT  -Genetic screening: NIPT low risk  -Anatomy US: choroid plexus cyst and echogenic intracardiac focus. L2 CPC resolved, EIF present, genetic screening ordered and normal. No follow up needed.  -S/p Tdap vaccine  -Strict return precautions given     RTC 1 weeks     Mary Dunbar MD  2023 1:24 PM

## 2023-08-07 NOTE — PATIENT INSTRUCTIONS
"Week 37 of Your Pregnancy: Care Instructions    Most babies are born between 37 and 40 weeks.   This is a good time to pack a bag to take with you to the birth. Then it will be ready to go when you are.     Learn about breastfeeding.  For example, find out about ways to hold your baby to make breastfeeding easier. And think about learning how to pump and store milk.     Know that crying is normal.  It's common for babies to cry 1 to 3 hours a day. Some cry more, and some cry less.     Learn why babies cry.  They may be hungry; have gas; need a diaper change; or feel cold, warm, tired, lonely, or tense. Sometimes they cry for unknown reasons.     Think about what will help you stay calm when your baby cries.  Taking slow, deep breaths can help. So can taking a break. It's okay to put your baby somewhere safe (like their crib) and walk away for a few minutes.     Learn about safe sleep for your baby.  Always put your baby to sleep on their back. Place them alone in a crib or bassinet with a firm, flat surface. Keep soft items like stuffed animals out of the crib.     Learn what to expect with  poop.  Your baby will have their own bowel patterns. Some babies have several bowel movements a day. Some have fewer.     Know that  babies will often have loose, yellow bowel movements.  Formula-fed babies have more formed stools. If your baby's poop looks like pellets, your baby is constipated.   Follow-up care is a key part of your treatment and safety. Be sure to make and go to all appointments, and call your doctor if you are having problems. It's also a good idea to know your test results and keep a list of the medicines you take.  Where can you learn more?  Go to https://www.[employer].net/patiented  Enter N257 in the search box to learn more about \"Week 37 of Your Pregnancy: Care Instructions.\"  Current as of: 2022               Content Version: 13.7    [id number] [employer] "   Care instructions adapted under license by your healthcare professional. If you have questions about a medical condition or this instruction, always ask your healthcare professional. [employer] disclaims any warranty or liability for your use of this information.

## 2023-08-08 LAB — GP B STREP DNA SPEC QL NAA+PROBE: NEGATIVE

## 2023-08-14 ENCOUNTER — PRENATAL OFFICE VISIT (OUTPATIENT)
Dept: OBGYN | Facility: CLINIC | Age: 28
End: 2023-08-14
Payer: COMMERCIAL

## 2023-08-14 VITALS
BODY MASS INDEX: 37.26 KG/M2 | WEIGHT: 207 LBS | TEMPERATURE: 97.9 F | SYSTOLIC BLOOD PRESSURE: 111 MMHG | DIASTOLIC BLOOD PRESSURE: 77 MMHG | HEART RATE: 84 BPM

## 2023-08-14 DIAGNOSIS — Z34.93 PRENATAL CARE, THIRD TRIMESTER: Primary | ICD-10-CM

## 2023-08-14 PROCEDURE — 99207 PR PRENATAL VISIT: CPT | Performed by: OBSTETRICS & GYNECOLOGY

## 2023-08-14 NOTE — NURSING NOTE
"Initial /77 (BP Location: Right arm, Patient Position: Chair, Cuff Size: Adult Large)   Pulse 84   Temp 97.9  F (36.6  C) (Tympanic)   Wt 93.9 kg (207 lb)   LMP 11/21/2022   BMI 37.26 kg/m   Estimated body mass index is 37.26 kg/m  as calculated from the following:    Height as of 7/24/23: 1.588 m (5' 2.5\").    Weight as of this encounter: 93.9 kg (207 lb). .      Chani Hayes MA    "

## 2023-08-14 NOTE — PROGRESS NOTES
[facility]     Return OB Note     CC: Return OB      Subjective:  Rosibel is a 28 year old  at 37w1d  Denies vaginal bleeding, loss of fluid, or  regular contractions. Good fetal movement.  Complaints today: None     Objective:  /77 (BP Location: Right arm, Patient Position: Chair, Cuff Size: Adult Large)   Pulse 84   Temp 97.9  F (36.6  C) (Tympanic)   Wt 93.9 kg (207 lb)   LMP 2022   BMI 37.26 kg/m       Fundal height: 37  FHT: 140s  SVE /-2     Assessment/Plan:       IUP at 37w1d   -NOB labs normal. Mid trimester labs- failed 1 hour GTT, passed 3 hr GTT  -Genetic screening: NIPT low risk  -Anatomy US: choroid plexus cyst and echogenic intracardiac focus. L2 CPC resolved, EIF present, genetic screening ordered and normal. No follow up needed.  -S/p Tdap vaccine  -Strict return precautions given     RTC 1 weeks     Mary Dunbar MD   2023 1:16 PM

## 2023-08-21 ENCOUNTER — PRENATAL OFFICE VISIT (OUTPATIENT)
Dept: OBGYN | Facility: CLINIC | Age: 28
End: 2023-08-21
Payer: COMMERCIAL

## 2023-08-21 VITALS
SYSTOLIC BLOOD PRESSURE: 117 MMHG | WEIGHT: 213.1 LBS | HEIGHT: 63 IN | TEMPERATURE: 98.4 F | RESPIRATION RATE: 16 BRPM | BODY MASS INDEX: 37.76 KG/M2 | HEART RATE: 105 BPM | DIASTOLIC BLOOD PRESSURE: 73 MMHG

## 2023-08-21 DIAGNOSIS — Z34.83 PRENATAL CARE, SUBSEQUENT PREGNANCY IN THIRD TRIMESTER: Primary | ICD-10-CM

## 2023-08-21 PROCEDURE — 99207 PR PRENATAL VISIT: CPT | Performed by: OBSTETRICS & GYNECOLOGY

## 2023-08-21 NOTE — PROGRESS NOTES
"Initial /73 (BP Location: Left arm, Patient Position: Chair, Cuff Size: Adult Regular)   Pulse 105   Temp 98.4  F (36.9  C) (Tympanic)   Resp 16   Ht 1.588 m (5' 2.5\")   Wt 96.7 kg (213 lb 1.6 oz)   LMP 11/21/2022   BMI 38.36 kg/m   Estimated body mass index is 38.36 kg/m  as calculated from the following:    Height as of this encounter: 1.588 m (5' 2.5\").    Weight as of this encounter: 96.7 kg (213 lb 1.6 oz). .    "

## 2023-08-21 NOTE — PATIENT INSTRUCTIONS
Week 38 of Your Pregnancy: Care Instructions  Believe it or not, your baby is almost here. You may notice how your baby responds to sounds, warmth, cold, and light. You may even know what kind of music your baby likes.    Even if you expect a vaginal birth, it's a good idea to learn about  section ().  is the delivery of a baby through a cut (incision) in your belly. It's a major surgery, so it has more risks than a vaginal birth.   During the first 2 weeks after the birth, limit visitors. Don't allow visitors who have colds or infections. Ask visitors to wash their hands before they touch your baby. And never let anyone smoke around your baby.     Know about unplanned C-sections.  Reasons for an unplanned  include labor that slows or stops, signs of distress in your baby, and high blood pressure or other problems for you.     Know about planned C-sections.  If your baby isn't in a head-down position for delivery (breech position), your doctor may plan a . Or you may have a planned  if you're having twins or more.     Get as much help as you can while you're in the hospital.  You can learn about feeding, diapering, and bathing your baby.     Talk to your doctor or midwife about how to care for the umbilical cord stump.  If your baby will be circumcised, also ask about how to care for that.     Ask friends or family for help, as you need it.  If you can, have another adult in your home for at least 2 or 3 days after the birth.     Try to nap when your baby naps.  This may be your best chance to get some sleep.     Watch for changes in your mental health.  For the first 1 to 2 weeks after birth, it's common to cry or feel sad or irritable. If these feelings last for more than 2 weeks, you may have postpartum depression. Ask your doctor for help. Postpartum depression can be treated.   Follow-up care is a key part of your treatment and safety. Be sure to make and go  "to all appointments, and call your doctor if you are having problems. It's also a good idea to know your test results and keep a list of the medicines you take.  Where can you learn more?  Go to https://www.BlueSpace.net/patiented  Enter B044 in the search box to learn more about \"Week 38 of Your Pregnancy: Care Instructions.\"  Current as of: 2022               Content Version: 13.7    7845-4665 [employer].   Care instructions adapted under license by your healthcare professional. If you have questions about a medical condition or this instruction, always ask your healthcare professional. [employer] disclaims any warranty or liability for your use of this information.      Week 39 of Your Pregnancy: Care Instructions    [place] babies can look different than what you see in pictures or movies. Their heads can be a strange shape right after birth. And they may have swollen eyes and red marks on their faces.   You can still get pregnant even if you are breastfeeding. If you don't want to get pregnant, talk to your doctor about birth control.   Tips for week 39 of pregnancy  If you plan to breastfeed, get prepared.     Continue to eat healthy foods.  Keep taking your prenatal vitamins during breastfeeding if your doctor recommends it.  Talk to your doctor before taking any medicines or supplements.  Choose the right birth control for you.     Intrauterine devices (IUDs) are placed in the uterus. Sometimes the IUD can be placed right after giving birth. They work for years.  Hormonal implants are placed under the skin of the arm. They also work for years.  Depo-Provera is a shot. You get it every 3 months.  Birth control pills can be used. They're taken every day.  Tubal ligation (tying your tubes) and vasectomy are surgeries. They're permanent.  Diaphragms, spermicide, and condoms must be used each time you have sex. If you used a diaphragm before, you should get refitted after " "the baby is born.  A birth control patch or ring can be used. These just can't be started until several weeks after you give birth.  Follow-up care is a key part of your treatment and safety. Be sure to make and go to all appointments, and call your doctor if you are having problems. It's also a good idea to know your test results and keep a list of the medicines you take.  Where can you learn more?  Go to https://www.B[employer] Snacks.net/patiented  Enter A811 in the search box to learn more about \"Week 39 of Your Pregnancy: Care Instructions.\"  Current as of: 2022               Content Version: 13.7    3902-4287 [employer].   Care instructions adapted under license by your healthcare professional. If you have questions about a medical condition or this instruction, always ask your healthcare professional. [employer] disclaims any warranty or liability for your use of this information.      Weeks 40 to 41 of Your Pregnancy: Care Instructions  By week 40, you've reached your due date. Your baby could be coming any day. Most babies born after their due dates are healthy. But you may have tests to make sure everything is okay. If you feel stressed, you could try a meditation exercise, such as guided imagery. It may help you relax.    If you are past 41 weeks, your doctor may measure the amount of fluid that surrounds your baby. They may also test your baby's movement and heart rate.   If you don't start labor on your own by 41 or 42 weeks, your doctor may want to start (induce) labor. If there are other concerns, your doctor may talk to you about a .   To start (induce) your labor, your doctor may do any of these things.    Place a narrow tube with a small balloon on the end (balloon catheter) into your cervix.  The doctor inflates the balloon. This helps your cervix open (dilate).     Sweep the membranes (separate the lining of the amniotic sac from the uterus).  This helps " "the uterus make a chemical that can start contractions.     \"Break your water\" (rupture the amniotic sac).  This may be done if your cervix has started to open.     Use medicines.  They may be used to soften the cervix or start contractions.   How to do guided imagery    Close your eyes, and take a few deep breaths. Picture a peaceful setting, such as a beach or a meadow. Add a winding path. Follow the path until you feel more and more relaxed.   Take a few minutes to breathe slowly and feel the calm. When you are ready, slowly take yourself back to the present. Count to 3, and open your eyes.   Follow-up care is a key part of your treatment and safety. Be sure to make and go to all appointments, and call your doctor if you are having problems. It's also a good idea to know your test results and keep a list of the medicines you take.  Where can you learn more?  Go to https://www.[employer]/patiented  Enter T922 in the search box to learn more about \"Weeks 40 to 41 of Your Pregnancy: Care Instructions.\"  Current as of: November 9, 2022               Content Version: 13.7    2109-9322 [employer].   Care instructions adapted under license by your healthcare professional. If you have questions about a medical condition or this instruction, always ask your healthcare professional. [employer] disclaims any warranty or liability for your use of this information.      Labor Induction  What You Need to Know  What is labor induction?  In most cases, it is best to go into labor naturally. When labor does not start on its own, we may use medicine or other methods to start (induce) labor. This is called induction, which:  Helps the uterus contract  Thins, softens and opens the cervix (opening of the uterus)  When is induction used?  There are two types of induction.  Medical induction may be done to protect the health of the parent or baby if:  There are medical concerns for you or your baby.  You " "haven't had your baby by 41 weeks.  Induction for non-medical reasons may be done at 39 weeks or later if:  You live far from the hospital.  You've been having contractions for many days.  You've given birth quickly in the past.   We will not perform an induction for non-medical reasons before 39 weeks. Studies show that babies born before 39 weeks may struggle with breathing, feeding, sleeping and staying warm. They are more likely to have health problems and may need to stay in the hospital longer. If we start your labor for medical reasons, the benefits will outweigh these risks. We will talk to you about your risks, benefits and alternatives (other options) before we start your labor.  How is induction done?  We may start your labor by doing one or more of the following:  We may need to help your cervix soften and open (sometimes called \"ripening\") to prepare it for labor. There are two ways to do this:  Medicines--these may be in the form of pills that you swallow or medicines that are put into the vagina next to the cervix.  Mechanical--using either a single or double balloon. These are small balloons that are attached to tubes that go up inside the cervix. The balloons are then filled with water to put pressure on the cervix and help the cervix soften and open. Depending on the type of balloon used, you may be allowed to go home after it is placed.  After your cervix is ready:  We may give you medicine through an IV (a small tube placed in your vein). This medicine is called Pitocin. It helps the muscles of your uterus to contract.  We may make a small opening in your bag of water (the sac around your baby). This is called an amniotomy. Sometimes called \"breaking the water\". It may help your uterus contract and your cervix open.  What might happen if my labor is induced?  Some of this depends on how your labor is started and how your body responds. Your labor may be more complicated. You and your baby may " need more medical treatments. In general:  You may not go into labor right away. If so, we may send you home with follow-up instructions.  It will be important to monitor you and your baby during the induction.  You may not be able to move around during labor. You will have two belts with monitors attached to your belly. These allow the nurse to watch your contractions and your baby's heart rate.  Your labor may take longer than if you went into labor on your own. It might take more than one day.  If you need cervical ripening, it is important to know that it may be many hours (even days) until delivery happens.  Cervical ripening can be slow and require several doses before your body is ready to labor.  A long labor may increase the risk of infection in mother and baby.  Your labor may not progress as planned. This could lead to a  birth.  Can I plan the date of my delivery?  After 39 weeks, you may ask about planning your delivery date. This is only an option if your body--and your baby--are ready. To find out, we will check your cervix and your baby's heart rate.   If you are ready to be induced, we will give you a date and time to come to the hospital. If many patients are in labor that day, we may need to start your labor at another time.  If you are not ready, we will not start your labor. It will be safer for your baby to come on its own.  What else do I need to know?  Before you have an induction, make sure you understand the reasons, risks and benefits. Ask your doctor or nurse-midwife:  Why do I need to be induced?  What are the risks to my baby?  How will you start my labor?  How will you know if my baby is ready to be born?  How will you know if my body is ready to give birth?  Where can I get more information?  To learn more about induction, you may visit these websites:  The American College of Nurse-Midwives:  www.mymidwife.org  The American College of Obstetricians and Gynecologists:  www.acog.org  Childbirth Connection:  www.childbirthconnection.org  March of Dimes: www.marchofdimes.com  Association of Women's Health, Obstetrics, and  Nursing  www.xtudog7kbm.org/go-the-full-40&#047;  For informational purposes only. Not to replace the advice of your health care provider. Copyright   2008 [facility]. All rights reserved. Clinically reviewed by the  System Operations Leadership team. [employer] [id number] - REV .

## 2023-08-21 NOTE — PROGRESS NOTES
"[facility]     Return OB Note     CC: Return OB      Subjective:  Rosibel is a 28 year old  at 38w1d  Denies vaginal bleeding, loss of fluid, or  regular contractions. Good fetal movement.  Complaints today: None, wants to be induced!     /73 (BP Location: Left arm, Patient Position: Chair, Cuff Size: Adult Regular)   Pulse 105   Temp 98.4  F (36.9  C) (Tympanic)   Resp 16   Ht 1.588 m (5' 2.5\")   Wt 96.7 kg (213 lb 1.6 oz)   LMP 2022   BMI 38.36 kg/m       Assessment/Plan:       IUP at 38w1d   -NOB labs normal. Mid trimester labs- failed 1 hour GTT, passed 3 hr GTT  -Genetic screening: NIPT low risk  -Anatomy US: choroid plexus cyst and echogenic intracardiac focus. L2 CPC resolved, EIF present, genetic screening ordered and normal. No follow up needed.  -S/p Tdap vaccine  -Strict return precautions given  -membranes swept today, offered 39wk elective IOL with pitocin and AROM. Will talk to  and let me know.   -planning breastfeeding and epidural      RTC 1 weeks, strict labor and FM precautions reviewed, ob triage # given.     Usha Engle MD  OB/GYN   "

## 2023-08-23 RX ORDER — PROCHLORPERAZINE 25 MG
25 SUPPOSITORY, RECTAL RECTAL EVERY 12 HOURS PRN
Status: CANCELLED | OUTPATIENT
Start: 2023-08-23

## 2023-08-23 RX ORDER — OXYTOCIN 10 [USP'U]/ML
10 INJECTION, SOLUTION INTRAMUSCULAR; INTRAVENOUS
Status: CANCELLED | OUTPATIENT
Start: 2023-08-23

## 2023-08-23 RX ORDER — ONDANSETRON 4 MG/1
4 TABLET, ORALLY DISINTEGRATING ORAL EVERY 6 HOURS PRN
Status: CANCELLED | OUTPATIENT
Start: 2023-08-23

## 2023-08-23 RX ORDER — ACETAMINOPHEN 325 MG/1
650 TABLET ORAL EVERY 4 HOURS PRN
Status: CANCELLED | OUTPATIENT
Start: 2023-08-23

## 2023-08-23 RX ORDER — METOCLOPRAMIDE HYDROCHLORIDE 5 MG/ML
10 INJECTION INTRAMUSCULAR; INTRAVENOUS EVERY 6 HOURS PRN
Status: CANCELLED | OUTPATIENT
Start: 2023-08-23

## 2023-08-23 RX ORDER — CITRIC ACID/SODIUM CITRATE 334-500MG
30 SOLUTION, ORAL ORAL
Status: CANCELLED | OUTPATIENT
Start: 2023-08-23

## 2023-08-23 RX ORDER — OXYTOCIN/0.9 % SODIUM CHLORIDE 30/500 ML
340 PLASTIC BAG, INJECTION (ML) INTRAVENOUS CONTINUOUS PRN
Status: CANCELLED | OUTPATIENT
Start: 2023-08-23

## 2023-08-23 RX ORDER — TERBUTALINE SULFATE 1 MG/ML
0.25 INJECTION, SOLUTION SUBCUTANEOUS
Status: CANCELLED | OUTPATIENT
Start: 2023-08-23

## 2023-08-23 RX ORDER — OXYCODONE HYDROCHLORIDE 5 MG/1
5 TABLET ORAL
Status: CANCELLED | OUTPATIENT
Start: 2023-08-23

## 2023-08-23 RX ORDER — IBUPROFEN 200 MG
800 TABLET ORAL
Status: CANCELLED | OUTPATIENT
Start: 2023-08-23 | End: 2023-08-28

## 2023-08-23 RX ORDER — CARBOPROST TROMETHAMINE 250 UG/ML
250 INJECTION, SOLUTION INTRAMUSCULAR
Status: CANCELLED | OUTPATIENT
Start: 2023-08-23

## 2023-08-23 RX ORDER — NALOXONE HYDROCHLORIDE 0.4 MG/ML
0.4 INJECTION, SOLUTION INTRAMUSCULAR; INTRAVENOUS; SUBCUTANEOUS
Status: CANCELLED | OUTPATIENT
Start: 2023-08-23

## 2023-08-23 RX ORDER — KETOROLAC TROMETHAMINE 30 MG/ML
30 INJECTION, SOLUTION INTRAMUSCULAR; INTRAVENOUS
Status: CANCELLED | OUTPATIENT
Start: 2023-08-23 | End: 2023-08-28

## 2023-08-23 RX ORDER — FENTANYL CITRATE 50 UG/ML
100 INJECTION, SOLUTION INTRAMUSCULAR; INTRAVENOUS
Status: CANCELLED | OUTPATIENT
Start: 2023-08-23

## 2023-08-23 RX ORDER — METHYLERGONOVINE MALEATE 0.2 MG/ML
200 INJECTION INTRAVENOUS
Status: CANCELLED | OUTPATIENT
Start: 2023-08-23

## 2023-08-23 RX ORDER — OXYTOCIN/0.9 % SODIUM CHLORIDE 30/500 ML
100-340 PLASTIC BAG, INJECTION (ML) INTRAVENOUS CONTINUOUS PRN
Status: CANCELLED | OUTPATIENT
Start: 2023-08-23

## 2023-08-23 RX ORDER — LIDOCAINE 40 MG/G
CREAM TOPICAL
Status: CANCELLED | OUTPATIENT
Start: 2023-08-23

## 2023-08-23 RX ORDER — ONDANSETRON 2 MG/ML
4 INJECTION INTRAMUSCULAR; INTRAVENOUS EVERY 6 HOURS PRN
Status: CANCELLED | OUTPATIENT
Start: 2023-08-23

## 2023-08-23 RX ORDER — PROCHLORPERAZINE MALEATE 5 MG
10 TABLET ORAL EVERY 6 HOURS PRN
Status: CANCELLED | OUTPATIENT
Start: 2023-08-23

## 2023-08-23 RX ORDER — SODIUM CHLORIDE, SODIUM LACTATE, POTASSIUM CHLORIDE, CALCIUM CHLORIDE 600; 310; 30; 20 MG/100ML; MG/100ML; MG/100ML; MG/100ML
INJECTION, SOLUTION INTRAVENOUS CONTINUOUS PRN
Status: CANCELLED | OUTPATIENT
Start: 2023-08-23

## 2023-08-23 RX ORDER — NALOXONE HYDROCHLORIDE 0.4 MG/ML
0.2 INJECTION, SOLUTION INTRAMUSCULAR; INTRAVENOUS; SUBCUTANEOUS
Status: CANCELLED | OUTPATIENT
Start: 2023-08-23

## 2023-08-23 RX ORDER — MISOPROSTOL 200 UG/1
800 TABLET ORAL
Status: CANCELLED | OUTPATIENT
Start: 2023-08-23

## 2023-08-23 RX ORDER — OXYTOCIN/0.9 % SODIUM CHLORIDE 30/500 ML
1-24 PLASTIC BAG, INJECTION (ML) INTRAVENOUS CONTINUOUS
Status: CANCELLED | OUTPATIENT
Start: 2023-08-23

## 2023-08-23 RX ORDER — MISOPROSTOL 100 UG/1
400 TABLET ORAL
Status: CANCELLED | OUTPATIENT
Start: 2023-08-23

## 2023-08-23 RX ORDER — METOCLOPRAMIDE 5 MG/1
10 TABLET ORAL EVERY 6 HOURS PRN
Status: CANCELLED | OUTPATIENT
Start: 2023-08-23

## 2023-08-26 ENCOUNTER — TELEPHONE (OUTPATIENT)
Dept: OBGYN | Facility: CLINIC | Age: 28
End: 2023-08-26
Payer: COMMERCIAL

## 2023-08-28 ENCOUNTER — HOSPITAL ENCOUNTER (INPATIENT)
Facility: CLINIC | Age: 28
LOS: 1 days | Discharge: ANOTHER HOSPITAL | End: 2023-08-29
Attending: OBSTETRICS & GYNECOLOGY | Admitting: OBSTETRICS & GYNECOLOGY
Payer: COMMERCIAL

## 2023-08-28 ENCOUNTER — APPOINTMENT (OUTPATIENT)
Dept: GENERAL RADIOLOGY | Facility: CLINIC | Age: 28
End: 2023-08-28
Attending: OBSTETRICS & GYNECOLOGY
Payer: COMMERCIAL

## 2023-08-28 ENCOUNTER — ANESTHESIA (OUTPATIENT)
Dept: SURGERY | Facility: CLINIC | Age: 28
End: 2023-08-28
Payer: COMMERCIAL

## 2023-08-28 ENCOUNTER — ANESTHESIA EVENT (OUTPATIENT)
Dept: SURGERY | Facility: CLINIC | Age: 28
End: 2023-08-28
Payer: COMMERCIAL

## 2023-08-28 ENCOUNTER — APPOINTMENT (OUTPATIENT)
Dept: CT IMAGING | Facility: CLINIC | Age: 28
End: 2023-08-28
Attending: OBSTETRICS & GYNECOLOGY
Payer: COMMERCIAL

## 2023-08-28 DIAGNOSIS — D50.8 IRON DEFICIENCY ANEMIA SECONDARY TO INADEQUATE DIETARY IRON INTAKE: ICD-10-CM

## 2023-08-28 DIAGNOSIS — Z98.891 S/P PRIMARY LOW TRANSVERSE C-SECTION: ICD-10-CM

## 2023-08-28 DIAGNOSIS — Z98.891 S/P CESAREAN SECTION: Primary | ICD-10-CM

## 2023-08-28 PROBLEM — Z34.90 ENCOUNTER FOR INDUCTION OF LABOR: Status: ACTIVE | Noted: 2023-08-28

## 2023-08-28 PROBLEM — R55 EPISODE OF LOSS OF CONSCIOUSNESS: Status: ACTIVE | Noted: 2023-08-28

## 2023-08-28 LAB
ALBUMIN MFR UR ELPH: 34.1 MG/DL
ALT SERPL W P-5'-P-CCNC: 8 U/L (ref 0–50)
AMPHETAMINES UR QL SCN: NORMAL
AST SERPL W P-5'-P-CCNC: 14 U/L (ref 0–45)
BARBITURATES UR QL SCN: NORMAL
BENZODIAZ UR QL SCN: NORMAL
BZE UR QL SCN: NORMAL
CANNABINOIDS UR QL SCN: NORMAL
CREAT SERPL-MCNC: 0.73 MG/DL (ref 0.51–0.95)
CREAT UR-MCNC: 237.6 MG/DL
ERYTHROCYTE [DISTWIDTH] IN BLOOD BY AUTOMATED COUNT: 12.5 % (ref 10–15)
GFR SERPL CREATININE-BSD FRML MDRD: >90 ML/MIN/1.73M2
HCT VFR BLD AUTO: 32.4 % (ref 35–47)
HGB BLD-MCNC: 10.7 G/DL (ref 11.7–15.7)
MCH RBC QN AUTO: 29.6 PG (ref 26.5–33)
MCHC RBC AUTO-ENTMCNC: 33 G/DL (ref 31.5–36.5)
MCV RBC AUTO: 90 FL (ref 78–100)
OPIATES UR QL SCN: NORMAL
PCP QUAL URINE (ROCHE): NORMAL
PLATELET # BLD AUTO: 188 10E3/UL (ref 150–450)
PROT/CREAT 24H UR: 0.14 MG/MG CR (ref 0–0.2)
RBC # BLD AUTO: 3.61 10E6/UL (ref 3.8–5.2)
WBC # BLD AUTO: 16.8 10E3/UL (ref 4–11)

## 2023-08-28 PROCEDURE — 360N000076 HC SURGERY LEVEL 3, PER MIN: Performed by: OBSTETRICS & GYNECOLOGY

## 2023-08-28 PROCEDURE — 88307 TISSUE EXAM BY PATHOLOGIST: CPT | Mod: TC | Performed by: OBSTETRICS & GYNECOLOGY

## 2023-08-28 PROCEDURE — 999N000063 XR ABDOMEN PORT 1 VIEW

## 2023-08-28 PROCEDURE — 120N000001 HC R&B MED SURG/OB

## 2023-08-28 PROCEDURE — 88307 TISSUE EXAM BY PATHOLOGIST: CPT | Mod: 26 | Performed by: PATHOLOGY

## 2023-08-28 PROCEDURE — 250N000011 HC RX IP 250 OP 636: Performed by: NURSE ANESTHETIST, CERTIFIED REGISTERED

## 2023-08-28 PROCEDURE — 370N000017 HC ANESTHESIA TECHNICAL FEE, PER MIN: Performed by: OBSTETRICS & GYNECOLOGY

## 2023-08-28 PROCEDURE — 82565 ASSAY OF CREATININE: CPT | Performed by: OBSTETRICS & GYNECOLOGY

## 2023-08-28 PROCEDURE — 710N000012 HC RECOVERY PHASE 2, PER MINUTE: Performed by: OBSTETRICS & GYNECOLOGY

## 2023-08-28 PROCEDURE — 250N000025 HC SEVOFLURANE, PER MIN: Performed by: OBSTETRICS & GYNECOLOGY

## 2023-08-28 PROCEDURE — 70450 CT HEAD/BRAIN W/O DYE: CPT

## 2023-08-28 PROCEDURE — 250N000011 HC RX IP 250 OP 636: Mod: JZ | Performed by: OBSTETRICS & GYNECOLOGY

## 2023-08-28 PROCEDURE — 250N000011 HC RX IP 250 OP 636: Performed by: OBSTETRICS & GYNECOLOGY

## 2023-08-28 PROCEDURE — 84460 ALANINE AMINO (ALT) (SGPT): CPT | Performed by: OBSTETRICS & GYNECOLOGY

## 2023-08-28 PROCEDURE — 250N000009 HC RX 250: Performed by: OBSTETRICS & GYNECOLOGY

## 2023-08-28 PROCEDURE — 80307 DRUG TEST PRSMV CHEM ANLYZR: CPT | Performed by: OBSTETRICS & GYNECOLOGY

## 2023-08-28 PROCEDURE — 84156 ASSAY OF PROTEIN URINE: CPT | Performed by: OBSTETRICS & GYNECOLOGY

## 2023-08-28 PROCEDURE — 250N000009 HC RX 250: Performed by: NURSE ANESTHETIST, CERTIFIED REGISTERED

## 2023-08-28 PROCEDURE — 36415 COLL VENOUS BLD VENIPUNCTURE: CPT | Performed by: OBSTETRICS & GYNECOLOGY

## 2023-08-28 PROCEDURE — 258N000003 HC RX IP 258 OP 636: Performed by: OBSTETRICS & GYNECOLOGY

## 2023-08-28 PROCEDURE — 710N000009 HC RECOVERY PHASE 1, LEVEL 1, PER MIN: Performed by: OBSTETRICS & GYNECOLOGY

## 2023-08-28 PROCEDURE — 272N000001 HC OR GENERAL SUPPLY STERILE: Performed by: OBSTETRICS & GYNECOLOGY

## 2023-08-28 PROCEDURE — 85014 HEMATOCRIT: CPT | Performed by: OBSTETRICS & GYNECOLOGY

## 2023-08-28 PROCEDURE — 59510 CESAREAN DELIVERY: CPT | Performed by: OBSTETRICS & GYNECOLOGY

## 2023-08-28 PROCEDURE — 250N000013 HC RX MED GY IP 250 OP 250 PS 637: Performed by: OBSTETRICS & GYNECOLOGY

## 2023-08-28 PROCEDURE — 84450 TRANSFERASE (AST) (SGOT): CPT | Performed by: OBSTETRICS & GYNECOLOGY

## 2023-08-28 RX ORDER — OXYTOCIN 10 [USP'U]/ML
10 INJECTION, SOLUTION INTRAMUSCULAR; INTRAVENOUS
Status: DISCONTINUED | OUTPATIENT
Start: 2023-08-28 | End: 2023-08-29 | Stop reason: HOSPADM

## 2023-08-28 RX ORDER — LIDOCAINE 40 MG/G
CREAM TOPICAL
Status: DISCONTINUED | OUTPATIENT
Start: 2023-08-28 | End: 2023-08-29 | Stop reason: HOSPADM

## 2023-08-28 RX ORDER — SODIUM CHLORIDE, SODIUM LACTATE, POTASSIUM CHLORIDE, CALCIUM CHLORIDE 600; 310; 30; 20 MG/100ML; MG/100ML; MG/100ML; MG/100ML
INJECTION, SOLUTION INTRAVENOUS CONTINUOUS PRN
Status: DISCONTINUED | OUTPATIENT
Start: 2023-08-28 | End: 2023-08-28

## 2023-08-28 RX ORDER — ONDANSETRON 2 MG/ML
4 INJECTION INTRAMUSCULAR; INTRAVENOUS EVERY 6 HOURS PRN
Status: DISCONTINUED | OUTPATIENT
Start: 2023-08-28 | End: 2023-08-28

## 2023-08-28 RX ORDER — PROCHLORPERAZINE MALEATE 10 MG
10 TABLET ORAL EVERY 6 HOURS PRN
Status: DISCONTINUED | OUTPATIENT
Start: 2023-08-28 | End: 2023-08-28

## 2023-08-28 RX ORDER — METOCLOPRAMIDE 10 MG/1
10 TABLET ORAL EVERY 6 HOURS PRN
Status: DISCONTINUED | OUTPATIENT
Start: 2023-08-28 | End: 2023-08-28

## 2023-08-28 RX ORDER — OXYTOCIN/0.9 % SODIUM CHLORIDE 30/500 ML
340 PLASTIC BAG, INJECTION (ML) INTRAVENOUS CONTINUOUS PRN
Status: COMPLETED | OUTPATIENT
Start: 2023-08-28 | End: 2023-08-28

## 2023-08-28 RX ORDER — NALOXONE HYDROCHLORIDE 0.4 MG/ML
0.4 INJECTION, SOLUTION INTRAMUSCULAR; INTRAVENOUS; SUBCUTANEOUS
Status: DISCONTINUED | OUTPATIENT
Start: 2023-08-28 | End: 2023-08-29 | Stop reason: HOSPADM

## 2023-08-28 RX ORDER — CITRIC ACID/SODIUM CITRATE 334-500MG
30 SOLUTION, ORAL ORAL
Status: DISCONTINUED | OUTPATIENT
Start: 2023-08-28 | End: 2023-08-28

## 2023-08-28 RX ORDER — NALOXONE HYDROCHLORIDE 0.4 MG/ML
0.2 INJECTION, SOLUTION INTRAMUSCULAR; INTRAVENOUS; SUBCUTANEOUS
Status: DISCONTINUED | OUTPATIENT
Start: 2023-08-28 | End: 2023-08-29 | Stop reason: HOSPADM

## 2023-08-28 RX ORDER — SODIUM CHLORIDE, SODIUM LACTATE, POTASSIUM CHLORIDE, CALCIUM CHLORIDE 600; 310; 30; 20 MG/100ML; MG/100ML; MG/100ML; MG/100ML
INJECTION, SOLUTION INTRAVENOUS CONTINUOUS
Status: DISCONTINUED | OUTPATIENT
Start: 2023-08-28 | End: 2023-08-29 | Stop reason: HOSPADM

## 2023-08-28 RX ORDER — HYDROMORPHONE HCL IN WATER/PF 6 MG/30 ML
0.2 PATIENT CONTROLLED ANALGESIA SYRINGE INTRAVENOUS EVERY 5 MIN PRN
Status: DISCONTINUED | OUTPATIENT
Start: 2023-08-28 | End: 2023-08-29 | Stop reason: HOSPADM

## 2023-08-28 RX ORDER — METOCLOPRAMIDE 10 MG/1
10 TABLET ORAL EVERY 6 HOURS PRN
Status: DISCONTINUED | OUTPATIENT
Start: 2023-08-28 | End: 2023-08-29 | Stop reason: HOSPADM

## 2023-08-28 RX ORDER — KETOROLAC TROMETHAMINE 30 MG/ML
30 INJECTION, SOLUTION INTRAMUSCULAR; INTRAVENOUS
Status: DISCONTINUED | OUTPATIENT
Start: 2023-08-28 | End: 2023-08-29 | Stop reason: HOSPADM

## 2023-08-28 RX ORDER — OXYCODONE HYDROCHLORIDE 5 MG/1
10 TABLET ORAL
Status: DISCONTINUED | OUTPATIENT
Start: 2023-08-28 | End: 2023-08-29 | Stop reason: HOSPADM

## 2023-08-28 RX ORDER — MISOPROSTOL 200 UG/1
400 TABLET ORAL
Status: DISCONTINUED | OUTPATIENT
Start: 2023-08-28 | End: 2023-08-29 | Stop reason: HOSPADM

## 2023-08-28 RX ORDER — OXYTOCIN/0.9 % SODIUM CHLORIDE 30/500 ML
340 PLASTIC BAG, INJECTION (ML) INTRAVENOUS CONTINUOUS PRN
Status: DISCONTINUED | OUTPATIENT
Start: 2023-08-28 | End: 2023-08-29 | Stop reason: HOSPADM

## 2023-08-28 RX ORDER — IBUPROFEN 800 MG/1
800 TABLET, FILM COATED ORAL EVERY 6 HOURS
Status: DISCONTINUED | OUTPATIENT
Start: 2023-08-29 | End: 2023-08-29 | Stop reason: HOSPADM

## 2023-08-28 RX ORDER — MISOPROSTOL 200 UG/1
800 TABLET ORAL
Status: DISCONTINUED | OUTPATIENT
Start: 2023-08-28 | End: 2023-08-28

## 2023-08-28 RX ORDER — TERBUTALINE SULFATE 1 MG/ML
0.25 INJECTION, SOLUTION SUBCUTANEOUS
Status: DISCONTINUED | OUTPATIENT
Start: 2023-08-28 | End: 2023-08-28

## 2023-08-28 RX ORDER — BISACODYL 10 MG
10 SUPPOSITORY, RECTAL RECTAL DAILY PRN
Status: DISCONTINUED | OUTPATIENT
Start: 2023-08-30 | End: 2023-08-29 | Stop reason: HOSPADM

## 2023-08-28 RX ORDER — MISOPROSTOL 200 UG/1
400 TABLET ORAL
Status: DISCONTINUED | OUTPATIENT
Start: 2023-08-28 | End: 2023-08-28

## 2023-08-28 RX ORDER — METOCLOPRAMIDE HYDROCHLORIDE 5 MG/ML
10 INJECTION INTRAMUSCULAR; INTRAVENOUS EVERY 6 HOURS PRN
Status: DISCONTINUED | OUTPATIENT
Start: 2023-08-28 | End: 2023-08-29 | Stop reason: HOSPADM

## 2023-08-28 RX ORDER — DEXTROSE, SODIUM CHLORIDE, SODIUM LACTATE, POTASSIUM CHLORIDE, AND CALCIUM CHLORIDE 5; .6; .31; .03; .02 G/100ML; G/100ML; G/100ML; G/100ML; G/100ML
INJECTION, SOLUTION INTRAVENOUS CONTINUOUS
Status: DISCONTINUED | OUTPATIENT
Start: 2023-08-28 | End: 2023-08-29 | Stop reason: HOSPADM

## 2023-08-28 RX ORDER — ONDANSETRON 4 MG/1
4 TABLET, ORALLY DISINTEGRATING ORAL EVERY 30 MIN PRN
Status: DISCONTINUED | OUTPATIENT
Start: 2023-08-28 | End: 2023-08-29 | Stop reason: HOSPADM

## 2023-08-28 RX ORDER — FENTANYL CITRATE 50 UG/ML
25 INJECTION, SOLUTION INTRAMUSCULAR; INTRAVENOUS EVERY 5 MIN PRN
Status: DISCONTINUED | OUTPATIENT
Start: 2023-08-28 | End: 2023-08-29 | Stop reason: HOSPADM

## 2023-08-28 RX ORDER — CARBOPROST TROMETHAMINE 250 UG/ML
250 INJECTION, SOLUTION INTRAMUSCULAR
Status: DISCONTINUED | OUTPATIENT
Start: 2023-08-28 | End: 2023-08-29 | Stop reason: HOSPADM

## 2023-08-28 RX ORDER — ONDANSETRON 2 MG/ML
4 INJECTION INTRAMUSCULAR; INTRAVENOUS EVERY 30 MIN PRN
Status: DISCONTINUED | OUTPATIENT
Start: 2023-08-28 | End: 2023-08-29 | Stop reason: HOSPADM

## 2023-08-28 RX ORDER — MISOPROSTOL 200 UG/1
800 TABLET ORAL
Status: DISCONTINUED | OUTPATIENT
Start: 2023-08-28 | End: 2023-08-29 | Stop reason: HOSPADM

## 2023-08-28 RX ORDER — NALOXONE HYDROCHLORIDE 0.4 MG/ML
0.2 INJECTION, SOLUTION INTRAMUSCULAR; INTRAVENOUS; SUBCUTANEOUS
Status: DISCONTINUED | OUTPATIENT
Start: 2023-08-28 | End: 2023-08-28

## 2023-08-28 RX ORDER — SIMETHICONE 80 MG
80 TABLET,CHEWABLE ORAL 4 TIMES DAILY PRN
Status: DISCONTINUED | OUTPATIENT
Start: 2023-08-28 | End: 2023-08-29 | Stop reason: HOSPADM

## 2023-08-28 RX ORDER — OXYCODONE HYDROCHLORIDE 5 MG/1
5 TABLET ORAL
Status: DISCONTINUED | OUTPATIENT
Start: 2023-08-28 | End: 2023-08-29 | Stop reason: HOSPADM

## 2023-08-28 RX ORDER — METHYLERGONOVINE MALEATE 0.2 MG/ML
200 INJECTION INTRAVENOUS
Status: DISCONTINUED | OUTPATIENT
Start: 2023-08-28 | End: 2023-08-29 | Stop reason: HOSPADM

## 2023-08-28 RX ORDER — METHYLERGONOVINE MALEATE 0.2 MG/ML
200 INJECTION INTRAVENOUS
Status: DISCONTINUED | OUTPATIENT
Start: 2023-08-28 | End: 2023-08-28

## 2023-08-28 RX ORDER — OXYTOCIN/0.9 % SODIUM CHLORIDE 30/500 ML
1-24 PLASTIC BAG, INJECTION (ML) INTRAVENOUS CONTINUOUS
Status: DISCONTINUED | OUTPATIENT
Start: 2023-08-28 | End: 2023-08-28

## 2023-08-28 RX ORDER — FENTANYL CITRATE 50 UG/ML
50 INJECTION, SOLUTION INTRAMUSCULAR; INTRAVENOUS EVERY 5 MIN PRN
Status: DISCONTINUED | OUTPATIENT
Start: 2023-08-28 | End: 2023-08-29 | Stop reason: HOSPADM

## 2023-08-28 RX ORDER — AMOXICILLIN 250 MG
2 CAPSULE ORAL 2 TIMES DAILY
Status: DISCONTINUED | OUTPATIENT
Start: 2023-08-28 | End: 2023-08-29 | Stop reason: HOSPADM

## 2023-08-28 RX ORDER — AMOXICILLIN 250 MG
1 CAPSULE ORAL 2 TIMES DAILY
Status: DISCONTINUED | OUTPATIENT
Start: 2023-08-28 | End: 2023-08-29 | Stop reason: HOSPADM

## 2023-08-28 RX ORDER — OXYTOCIN 10 [USP'U]/ML
INJECTION, SOLUTION INTRAMUSCULAR; INTRAVENOUS PRN
Status: DISCONTINUED | OUTPATIENT
Start: 2023-08-28 | End: 2023-08-28

## 2023-08-28 RX ORDER — TRANEXAMIC ACID 10 MG/ML
1 INJECTION, SOLUTION INTRAVENOUS EVERY 30 MIN PRN
Status: DISCONTINUED | OUTPATIENT
Start: 2023-08-28 | End: 2023-08-29 | Stop reason: HOSPADM

## 2023-08-28 RX ORDER — PROCHLORPERAZINE 25 MG
25 SUPPOSITORY, RECTAL RECTAL EVERY 12 HOURS PRN
Status: DISCONTINUED | OUTPATIENT
Start: 2023-08-28 | End: 2023-08-28

## 2023-08-28 RX ORDER — NALOXONE HYDROCHLORIDE 0.4 MG/ML
0.4 INJECTION, SOLUTION INTRAMUSCULAR; INTRAVENOUS; SUBCUTANEOUS
Status: DISCONTINUED | OUTPATIENT
Start: 2023-08-28 | End: 2023-08-28

## 2023-08-28 RX ORDER — PROCHLORPERAZINE 25 MG
25 SUPPOSITORY, RECTAL RECTAL EVERY 12 HOURS PRN
Status: DISCONTINUED | OUTPATIENT
Start: 2023-08-28 | End: 2023-08-29 | Stop reason: HOSPADM

## 2023-08-28 RX ORDER — ONDANSETRON 2 MG/ML
4 INJECTION INTRAMUSCULAR; INTRAVENOUS EVERY 6 HOURS PRN
Status: DISCONTINUED | OUTPATIENT
Start: 2023-08-28 | End: 2023-08-29 | Stop reason: HOSPADM

## 2023-08-28 RX ORDER — KETOROLAC TROMETHAMINE 30 MG/ML
30 INJECTION, SOLUTION INTRAMUSCULAR; INTRAVENOUS EVERY 6 HOURS
Status: COMPLETED | OUTPATIENT
Start: 2023-08-28 | End: 2023-08-28

## 2023-08-28 RX ORDER — METOCLOPRAMIDE HYDROCHLORIDE 5 MG/ML
10 INJECTION INTRAMUSCULAR; INTRAVENOUS EVERY 6 HOURS PRN
Status: DISCONTINUED | OUTPATIENT
Start: 2023-08-28 | End: 2023-08-28

## 2023-08-28 RX ORDER — LIDOCAINE 40 MG/G
CREAM TOPICAL
Status: DISCONTINUED | OUTPATIENT
Start: 2023-08-28 | End: 2023-08-28

## 2023-08-28 RX ORDER — PROCHLORPERAZINE MALEATE 10 MG
10 TABLET ORAL EVERY 6 HOURS PRN
Status: DISCONTINUED | OUTPATIENT
Start: 2023-08-28 | End: 2023-08-29 | Stop reason: HOSPADM

## 2023-08-28 RX ORDER — IBUPROFEN 800 MG/1
800 TABLET, FILM COATED ORAL
Status: DISCONTINUED | OUTPATIENT
Start: 2023-08-28 | End: 2023-08-29 | Stop reason: HOSPADM

## 2023-08-28 RX ORDER — FENTANYL CITRATE 50 UG/ML
100 INJECTION, SOLUTION INTRAMUSCULAR; INTRAVENOUS
Status: DISCONTINUED | OUTPATIENT
Start: 2023-08-28 | End: 2023-08-28

## 2023-08-28 RX ORDER — ACETAMINOPHEN 325 MG/1
975 TABLET ORAL EVERY 6 HOURS
Status: DISCONTINUED | OUTPATIENT
Start: 2023-08-28 | End: 2023-08-29 | Stop reason: HOSPADM

## 2023-08-28 RX ORDER — MODIFIED LANOLIN
OINTMENT (GRAM) TOPICAL
Status: DISCONTINUED | OUTPATIENT
Start: 2023-08-28 | End: 2023-08-29 | Stop reason: HOSPADM

## 2023-08-28 RX ORDER — ACETAMINOPHEN 325 MG/1
650 TABLET ORAL EVERY 4 HOURS PRN
Status: DISCONTINUED | OUTPATIENT
Start: 2023-08-28 | End: 2023-08-28

## 2023-08-28 RX ORDER — ONDANSETRON 4 MG/1
4 TABLET, ORALLY DISINTEGRATING ORAL EVERY 6 HOURS PRN
Status: DISCONTINUED | OUTPATIENT
Start: 2023-08-28 | End: 2023-08-29 | Stop reason: HOSPADM

## 2023-08-28 RX ORDER — CARBOPROST TROMETHAMINE 250 UG/ML
250 INJECTION, SOLUTION INTRAMUSCULAR
Status: DISCONTINUED | OUTPATIENT
Start: 2023-08-28 | End: 2023-08-28

## 2023-08-28 RX ORDER — OXYCODONE HYDROCHLORIDE 5 MG/1
5 TABLET ORAL EVERY 4 HOURS PRN
Status: DISCONTINUED | OUTPATIENT
Start: 2023-08-28 | End: 2023-08-29 | Stop reason: HOSPADM

## 2023-08-28 RX ORDER — ONDANSETRON 4 MG/1
4 TABLET, ORALLY DISINTEGRATING ORAL EVERY 6 HOURS PRN
Status: DISCONTINUED | OUTPATIENT
Start: 2023-08-28 | End: 2023-08-28

## 2023-08-28 RX ORDER — PROPOFOL 10 MG/ML
INJECTION, EMULSION INTRAVENOUS PRN
Status: DISCONTINUED | OUTPATIENT
Start: 2023-08-28 | End: 2023-08-28

## 2023-08-28 RX ORDER — TRANEXAMIC ACID 10 MG/ML
1 INJECTION, SOLUTION INTRAVENOUS EVERY 30 MIN PRN
Status: DISCONTINUED | OUTPATIENT
Start: 2023-08-28 | End: 2023-08-28

## 2023-08-28 RX ORDER — HYDROMORPHONE HCL IN WATER/PF 6 MG/30 ML
0.4 PATIENT CONTROLLED ANALGESIA SYRINGE INTRAVENOUS EVERY 5 MIN PRN
Status: DISCONTINUED | OUTPATIENT
Start: 2023-08-28 | End: 2023-08-29 | Stop reason: HOSPADM

## 2023-08-28 RX ORDER — OXYTOCIN 10 [USP'U]/ML
10 INJECTION, SOLUTION INTRAMUSCULAR; INTRAVENOUS
Status: DISCONTINUED | OUTPATIENT
Start: 2023-08-28 | End: 2023-08-28

## 2023-08-28 RX ORDER — HYDROCORTISONE 25 MG/G
CREAM TOPICAL 3 TIMES DAILY PRN
Status: DISCONTINUED | OUTPATIENT
Start: 2023-08-28 | End: 2023-08-29 | Stop reason: HOSPADM

## 2023-08-28 RX ORDER — FENTANYL CITRATE 50 UG/ML
25 INJECTION, SOLUTION INTRAMUSCULAR; INTRAVENOUS
Status: DISCONTINUED | OUTPATIENT
Start: 2023-08-28 | End: 2023-08-29 | Stop reason: HOSPADM

## 2023-08-28 RX ORDER — OXYTOCIN/0.9 % SODIUM CHLORIDE 30/500 ML
100-340 PLASTIC BAG, INJECTION (ML) INTRAVENOUS CONTINUOUS PRN
Status: DISCONTINUED | OUTPATIENT
Start: 2023-08-28 | End: 2023-08-29 | Stop reason: HOSPADM

## 2023-08-28 RX ORDER — CEFAZOLIN SODIUM 1 G/3ML
INJECTION, POWDER, FOR SOLUTION INTRAMUSCULAR; INTRAVENOUS PRN
Status: DISCONTINUED | OUTPATIENT
Start: 2023-08-28 | End: 2023-08-28

## 2023-08-28 RX ADMIN — FENTANYL CITRATE 25 MCG: 50 INJECTION, SOLUTION INTRAMUSCULAR; INTRAVENOUS at 09:23

## 2023-08-28 RX ADMIN — MISOPROSTOL 400 MCG: 200 TABLET ORAL at 10:40

## 2023-08-28 RX ADMIN — SUGAMMADEX 200 MG: 100 INJECTION, SOLUTION INTRAVENOUS at 08:28

## 2023-08-28 RX ADMIN — CEFAZOLIN 2 G: 1 INJECTION, POWDER, FOR SOLUTION INTRAMUSCULAR; INTRAVENOUS at 07:56

## 2023-08-28 RX ADMIN — FENTANYL CITRATE 25 MCG: 50 INJECTION, SOLUTION INTRAMUSCULAR; INTRAVENOUS at 09:04

## 2023-08-28 RX ADMIN — FENTANYL CITRATE 100 MCG: 50 INJECTION, SOLUTION INTRAMUSCULAR; INTRAVENOUS at 08:04

## 2023-08-28 RX ADMIN — ACETAMINOPHEN 975 MG: 325 TABLET, FILM COATED ORAL at 10:01

## 2023-08-28 RX ADMIN — FENTANYL CITRATE 25 MCG: 50 INJECTION, SOLUTION INTRAMUSCULAR; INTRAVENOUS at 09:09

## 2023-08-28 RX ADMIN — Medication 500 ML: at 08:11

## 2023-08-28 RX ADMIN — SODIUM CHLORIDE, SODIUM LACTATE, POTASSIUM CHLORIDE, CALCIUM CHLORIDE AND DEXTROSE MONOHYDRATE: 5; 600; 310; 30; 20 INJECTION, SOLUTION INTRAVENOUS at 11:08

## 2023-08-28 RX ADMIN — PROPOFOL 150 MG: 10 INJECTION, EMULSION INTRAVENOUS at 07:44

## 2023-08-28 RX ADMIN — KETOROLAC TROMETHAMINE 30 MG: 30 INJECTION INTRAMUSCULAR; INTRAVENOUS at 16:09

## 2023-08-28 RX ADMIN — Medication 340 ML/HR: at 07:48

## 2023-08-28 RX ADMIN — OXYCODONE HYDROCHLORIDE 5 MG: 5 TABLET ORAL at 12:28

## 2023-08-28 RX ADMIN — ONDANSETRON 4 MG: 2 INJECTION INTRAMUSCULAR; INTRAVENOUS at 07:46

## 2023-08-28 RX ADMIN — ROCURONIUM BROMIDE 20 MG: 50 INJECTION, SOLUTION INTRAVENOUS at 07:46

## 2023-08-28 RX ADMIN — SODIUM CHLORIDE, POTASSIUM CHLORIDE, SODIUM LACTATE AND CALCIUM CHLORIDE: 600; 310; 30; 20 INJECTION, SOLUTION INTRAVENOUS at 07:36

## 2023-08-28 RX ADMIN — LIDOCAINE HYDROCHLORIDE 5 ML: 10 INJECTION, SOLUTION EPIDURAL; INFILTRATION; INTRACAUDAL; PERINEURAL at 07:44

## 2023-08-28 RX ADMIN — SENNOSIDES AND DOCUSATE SODIUM 1 TABLET: 50; 8.6 TABLET ORAL at 21:58

## 2023-08-28 RX ADMIN — SENNOSIDES AND DOCUSATE SODIUM 1 TABLET: 50; 8.6 TABLET ORAL at 10:01

## 2023-08-28 RX ADMIN — ACETAMINOPHEN 975 MG: 325 TABLET, FILM COATED ORAL at 21:58

## 2023-08-28 RX ADMIN — ACETAMINOPHEN 975 MG: 325 TABLET, FILM COATED ORAL at 16:09

## 2023-08-28 RX ADMIN — KETOROLAC TROMETHAMINE 30 MG: 30 INJECTION INTRAMUSCULAR; INTRAVENOUS at 21:57

## 2023-08-28 RX ADMIN — Medication 340 ML/HR: at 08:12

## 2023-08-28 RX ADMIN — Medication 100 MG: at 07:44

## 2023-08-28 RX ADMIN — TRANEXAMIC ACID 1 G: 10 INJECTION, SOLUTION INTRAVENOUS at 10:39

## 2023-08-28 RX ADMIN — FENTANYL CITRATE 25 MCG: 50 INJECTION, SOLUTION INTRAMUSCULAR; INTRAVENOUS at 09:19

## 2023-08-28 RX ADMIN — KETOROLAC TROMETHAMINE 30 MG: 30 INJECTION INTRAMUSCULAR; INTRAVENOUS at 10:01

## 2023-08-28 ASSESSMENT — ACTIVITIES OF DAILY LIVING (ADL)
CHANGE_IN_FUNCTIONAL_STATUS_SINCE_ONSET_OF_CURRENT_ILLNESS/INJURY: NO
WEAR_GLASSES_OR_BLIND: NO
WALKING_OR_CLIMBING_STAIRS_DIFFICULTY: NO
DOING_ERRANDS_INDEPENDENTLY_DIFFICULTY: NO
ADLS_ACUITY_SCORE: 18
ADLS_ACUITY_SCORE: 18
DIFFICULTY_EATING/SWALLOWING: NO
DRESSING/BATHING_DIFFICULTY: NO
ADLS_ACUITY_SCORE: 35
ADLS_ACUITY_SCORE: 18
TOILETING_ISSUES: NO
ADLS_ACUITY_SCORE: 18
ADLS_ACUITY_SCORE: 18
FALL_HISTORY_WITHIN_LAST_SIX_MONTHS: NO
ADLS_ACUITY_SCORE: 31
CONCENTRATING,_REMEMBERING_OR_MAKING_DECISIONS_DIFFICULTY: NO
ADLS_ACUITY_SCORE: 18

## 2023-08-28 NOTE — BRIEF OP NOTE
[facility]    Brief Operative Note    Pre-operative diagnosis: Fetal distress  Maternal seizure activity  Term Pregnancy  Post-operative diagnosis Same as pre-operative diagnosis    Procedure: Procedure(s):   SECTION  Surgeon: Surgeon(s) and Role:     * Rafat Alvarado MD - Primary     * Mary Dunbar MD - Assisting     * Carlos Kaplan PA-C  Anesthesia: General   Estimated Blood Loss: 634 ml    Drains: Champagne  Specimens:   ID Type Source Tests Collected by Time Destination   1 :  Placenta Placenta PLACENTA PATH ORDER AND INDICATIONS Rafat Alvarado MD 2023  8:08 AM    A :  Urine Urine, Champagne Catheter URINE DRUGS OF ABUSE SCREEN Rafat Alvarado MD 2023  8:01 AM    B :  Cord blood Umbilical Cord OR DOCUMENTATION ONLY Rafat Alvarado MD 2023  8:01 AM      Findings:   Viable female   Norml uterus , bilateral ovaries and fallopian tubes.  Complications: None.  Implants: * No implants in log *    Rafat Alvarado MD

## 2023-08-28 NOTE — PROGRESS NOTES
"Reviewed pt history with on call neurology team. Relayed events surrounding syncope, hypotension and possible seizure activity as well as time frame based on patient's memory and RNs description. After discussion, neurology team does feel less likely to be true seizure activity given description of events and \"post ictal\" time of only 1-2 minutes. He feels more likely related to be syncopal convulsions from hypotension. Neuro does recommend postpartum visit with them in 4-6w to formally review. In the interval, he said head CT is not unreasonable while in patient. Ordered to be completed today. Likely too late to complete a lactate as has been 4 hours since event and may be confounded with interval surgery.  Will continue to monitor closely. No current signs of ecclampsia. Vital stable, WNL. HELLP labs and UPC WNL    Mary Dunbar MD   8/28/2023 11:38 AM   "

## 2023-08-28 NOTE — ANESTHESIA POSTPROCEDURE EVALUATION
Patient: Rosibel Saeed    Procedure: Procedure(s):   SECTION       Anesthesia Type:  General    Note:  Disposition: Inpatient   Postop Pain Control: Uneventful            Sign Out: Well controlled pain   PONV: No   Neuro/Psych: Uneventful            Sign Out: Acceptable/Baseline neuro status   Airway/Respiratory: Uneventful            Sign Out: Acceptable/Baseline resp. status   CV/Hemodynamics: Uneventful            Sign Out: Acceptable CV status; No obvious hypovolemia; No obvious fluid overload   Other NRE: NONE   DID A NON-ROUTINE EVENT OCCUR? No           Last vitals:  Vitals Value Taken Time   /81 23 0845   Temp 36.4  C (97.5  F) 23 0840   Pulse 80 23 0845   Resp 16 23 0845   SpO2 97 % 23 0845       Electronically Signed By: Joel Santos CRNA, JACINDA PIERRE  2023  9:12 AM

## 2023-08-28 NOTE — PLAN OF CARE
Vitals stable. Fundus now firm/midline/U-1. Patient received TXA and Misoprostol d/t boggy uterus with fundal check at 1030am. Patient is tolerating regular diet. Champagne catheter is removed. Patient denies nausea, lightheaded/dizziness. Pain is well controlled with scheduled and PRN medications. Oxycodone given 1 time for pain 6/10, pain reassessment pain was 2/10. Patient was quite flat this morning, but has become more smiley and bonding well with baby. Patient had some tearful moments today, encouraged her through these and that it is ok and normal to cry. Appears much better at end of shift compared to this morning.    Incision is clean, dry, intact and open to air. Teaching completed on lifting & post caesarean restrictions, hand expression. feeding frequency, breastfeeding positioning, infant sleeping positions, pain management and hospital timeline.

## 2023-08-28 NOTE — PROGRESS NOTES
Patient arrived at 0700 for elective induction. While attempting oral temperature she had seizure like activity. Again about 5 minutes later she had more seizure like activity and had large emesis. Patient did not respond to her name or questions for a few minutes during that time.  Pressures dropped to 60/37. IV placed and bolus started. FHR dropped to 70/80's. Oxygen applied. RRT Called. Patient taken back for stat section and maier placed in OR.

## 2023-08-28 NOTE — OP NOTE
[facility] Obstetrics Operative Note    Pre-operative diagnosis: *Term intrauterine pregnancy  Transient loss of consciousness  Suspected seizure activity  Fetal distress   Post-operative diagnosis: Same  Viable female  9 pounds with Apgars of 4 and 8 and 9   Procedure: Primary low transverse  section   Surgeon: Rafat Alvarado MD     Assistant(s): Carlos Kaplan PA-C, PA-C  A surgical assistant was required for this surgery for his experience with retraction, achievement of hemostasis, and wound closure     Anesthesia: General Endotracheal Anesthesia   Quantitative blood loss: 634 mls   Total IV fluids: (See anesthesia record)   Blood transfusion: No transfusion was given during surgery   Total urine output: (See anesthesia record)   Drains: Champagne catheter   Specimens: placenta sent to pathology   Findings: Live   Female  Cephalic presentation:  left occiput anterior  APGARS: 1 minute: 4   5 minute: 8 10-minute: 9  Weight: 9 pounds  Placenta: Removed manually and sent for pathologic evaluation  Cord pH: Arterial pH 7.06 and base excess of -10.6  Venous pH 7.13 with a base excess of -9.3  Tubes: normal  Uterus: normal  Ovaries: Normal    Complications: None   Condition: Infant stable, transferred to general care nursery  Mother stable, transfered to post-anesthesia recovery   Comments: Rosibel Saeed   1995  [id number]      Rosibel Saeed  presented for the induction of labor at 39 weeks by maternal request.  Her pregnancy was uncomplicated.  Is a  2 para 1-0-0-1   I was called stat for a  delivery.  I was attending a procedure in room 6 downstairs.  I was called emergently to the OR 20 upstairs in the birthplace.  On my arrival there was a multitude of people.  The patient was on the table awake with a Champagne catheter and IV in place.  Fetal heart tones were in the 60s.  I briefly informed her what my attempt was to do a  delivery and that  she would be going to sleep.  She expressed understanding.  The OR @ [facility] where she was  in the supine position.  She was prepped and draped.  She underwent General anesthesia with endotracheal intubation.  She was then placed in the supine left lateral tilt position.     Once she was intubated Pfannenstiel incision was made carried down through the subcutaneous tissue the fascia which was transversely incised.  The fascia was freed midline rectus muscles in a cephalad and caudad direction.  Midline diastases of the exploited  Peritoneum entered bluntly bladder blade placed.  Low segment transverse incision was made carried down through the myometrium and through a venous lake.  The membranes were encountered and ruptured productive of clear fluid.  The uterine incision was tented by finger fracture technique.  Elevated new wound delivered at 0 747 viable female delayed cord clamping was not performed cord was doubly clamped and cut  handed to the nurse in attendance.  Weight was 9 pounds Apgars were 4, 8, and 9 at 1 5 and 10 minutes.  Cord blood was obtained.  The placenta was removed from the uterus manually and the uterus exteriorized.  IV Pitocin was started but the uterus was atonic.  She received 10 units of I intramyometrial Pitocin after which the uterus responded with increased tone.  Reinforcement placed around the hysterotomy site.  The uterus having been exteriorized.  The endometrium was soft curetted with a lap sponge removing debris and membranes.  Uterus was closed primarily with a running locked 0 Vicryl suture and imbricated with 0 Vicryl suture hemostasis was assured at this point time and uterine tone was good.  Uterine anatomy as well as a adnexal anatomy was found to be normal.  The abdomen pelvis were washed with warm normal saline and clots removed.  Uterus returned to its anatomic position and again hemostasis was assured.  Peritoneum was closed with running 2-0 Vicryl  sutures the rectus muscle reapproximated in the midline using interrupted 2-0 Vicryl.  Subcu was closed with 0 chromic after hemostasis assured with electrocautery.  At this point time Dr. Carleen hoskins was available.  She completed the closure and I returned to the OR 6 to complete my scheduled case.  As there was no precount, an x-ray was performed.  The finding of which was negative for retained materials.  She was awakened taken the PACU in good condition.    Rafat Alvarado MD

## 2023-08-28 NOTE — ANESTHESIA PREPROCEDURE EVALUATION
Anesthesia Pre-Procedure Evaluation    Patient: Rosibel Saeed   MRN: [id number] : 1995        Procedure : Procedure(s):   SECTION          Past Medical History:   Diagnosis Date    Chickenpox       Past Surgical History:   Procedure Laterality Date    ADENOIDECTOMY      LAPAROSCOPIC APPENDECTOMY      MOUTH SURGERY        Allergies   Allergen Reactions    Amoxicillin Rash      Social History     Tobacco Use    Smoking status: Never    Smokeless tobacco: Never   Substance Use Topics    Alcohol use: Not Currently     Comment: occas-quit with pregnancy      Wt Readings from Last 1 Encounters:   23 96.7 kg (213 lb 1.6 oz)        Anesthesia Evaluation            ROS/MED HX  ENT/Pulmonary:     (+)           allergic rhinitis,                            Neurologic:       Cardiovascular:       METS/Exercise Tolerance:     Hematologic:       Musculoskeletal:       GI/Hepatic:       Renal/Genitourinary:       Endo:       Psychiatric/Substance Use:       Infectious Disease:       Malignancy:       Other:      (+) Possibly pregnant, , ,            OUTSIDE LABS:  CBC:   Lab Results   Component Value Date    WBC 11.9 (H) 05/15/2023    WBC 8.5 2023    HGB 11.9 05/15/2023    HGB 13.4 2023    HCT 35.7 05/15/2023    HCT 38.4 2023     05/15/2023     2023     BMP: No results found for: NA, POTASSIUM, CHLORIDE, CO2, BUN, CR, GLC  COAGS: No results found for: PTT, INR, FIBR  POC: No results found for: BGM, HCG, HCGS  HEPATIC: No results found for: ALBUMIN, PROTTOTAL, ALT, AST, GGT, ALKPHOS, BILITOTAL, BILIDIRECT, JANEEN  OTHER:   Lab Results   Component Value Date    A1C 4.9 2023       Anesthesia Plan    ASA Status:  3, emergent       Anesthesia Type: General.     - Airway: ETT   Induction: Intravenous, RSI.   Maintenance: Balanced.   Techniques and Equipment:     - Airway: Video-Laryngoscope       Consents    Anesthesia Plan(s) and associated risks, benefits, and  realistic alternatives discussed. Questions answered and patient/representative(s) expressed understanding.     - Discussed:     - Discussed with:  Patient            Postoperative Care            Comments:                Joel Santos CRNA, APRN CRNA

## 2023-08-28 NOTE — MEDICATION SCRIBE - ADMISSION MEDICATION HISTORY
Medication Scribe Admission Medication History    Admission medication history is complete. The information provided in this note is only as accurate as the sources available at the time of the update.    Medication reconciliation/reorder completed by provider prior to medication history? Yes    Information Source(s): Patient and PTA med list  via  room phone.    Pertinent Information: Patient has breast pump at home.    Changes made to PTA medication list:  Added: None  Deleted: None  Changed: None    Medication Affordability:  Not including over the counter (OTC) medications, was there a time in the past 3 months when you did not take your medications as prescribed because of cost?: No    Allergies reviewed with patient and updates made in EHR: yes, added shellfish.    Medication History Completed By: Pratibha Austin 8/28/2023 2:41 PM    Prior to Admission medications    Medication Sig Last Dose Taking? Auth Provider Long Term End Date   cetirizine (ZYRTEC) 10 MG tablet Take 10 mg by mouth daily 8/25/2023 at am Yes Reported, Patient     Misc. Devices (BREAST PUMP) MISC 1 each See Admin Instructions on hand at not using yet Yes Seema Stephenson MD     Prenatal Vit-Fe Fumarate-FA (PRENATAL MULTIVITAMIN W/IRON) 27-0.8 MG tablet Take 1 tablet by mouth daily 8/28/2023 at am Yes Reported, Patient

## 2023-08-28 NOTE — PROGRESS NOTES
[facility] Labor and Delivery H&P  2023  Rosibel Saeed  [id number]      HPI: Rosibel Saeed is a 28 year old  at 39w1d who presented for IOL. Evaluated patient post op after STAT CS completed for fetal bradycardia. Pt doing ok. Tearful. States remembers coming in for induction, feeling a little nauseous during drive. On arrival felt ok and was in room when they started putting monitors on patient states she began feeling dizzy, noticing her vision darkening, feeling nauseous and then passing out. She remembers waking with an O2 mask on and then passing out again. When she awoke next she remembers noticing she vomited and being wheeled to the OR for surgery.     OBHX:   OB History    Para Term  AB Living   2 1 1 0 0 1   SAB IAB Ectopic Multiple Live Births   0 0 0 0 1      # Outcome Date GA Lbr Og/2nd Weight Sex Delivery Anes PTL Lv   2 Current            1 Term 21 41w0d 06:30 / 02:54 3.685 kg (8 lb 2 oz) M Vag-Spont EPI N FERDA      Name: King      Apgar1: 8  Apgar5: 9       MedicalHX:   Past Medical History:   Diagnosis Date    Chickenpox        SurgicalHX:   Past Surgical History:   Procedure Laterality Date    ADENOIDECTOMY      LAPAROSCOPIC APPENDECTOMY      MOUTH SURGERY         Medications:   No current facility-administered medications on file prior to encounter.  cetirizine (ZYRTEC) 10 MG tablet, Take 10 mg by mouth daily  Misc. Devices (BREAST PUMP) MISC, 1 each See Admin Instructions (Patient not taking: Reported on 2023)  Prenatal Vit-Fe Fumarate-FA (PRENATAL MULTIVITAMIN W/IRON) 27-0.8 MG tablet, Take 1 tablet by mouth daily        Allergies:  Allergies   Allergen Reactions    Amoxicillin Rash       FamilyHX:    Family History   Problem Relation Age of Onset    Seizure Disorder Mother     Spina bifida Mother     Colon Cancer Mother     Diabetes Mother         type II    No Known Problems Father     Cerebrovascular Disease Maternal Grandmother      Bleeding Disorder Maternal Grandmother         ?history of blood clots    Hypertension Maternal Grandfather        SocialHX:   Social History     Socioeconomic History    Marital status: Single   Tobacco Use    Smoking status: Never    Smokeless tobacco: Never   Vaping Use    Vaping Use: Never used   Substance and Sexual Activity    Alcohol use: Not Currently     Comment: occas-quit with pregnancy    Drug use: Never    Sexual activity: Yes     Partners: Male     Birth control/protection: Condom   Other Topics Concern    Parent/sibling w/ CABG, MI or angioplasty before 65F 55M? No       ROS: 10-point ROS negative except as in HPI    Physical Exam:  Vitals:    23 0925 23 0930 23 0932 23 0933   BP:  129/84 127/81    Pulse: 86 86 80 88   Resp: 16 16     Temp: 98.3  F (36.8  C)      TempSrc: Oral      SpO2: 96% 96% 95%      GEN: resting comfortably in bed, NAD   Neuro: EOMI, no focal deficits,alert and oriented  CV: RRR, no murmurs  PULM: CTAB, no increased work of breathing, no cough/wheeze   ABD: soft, gravid, non-tender, non-distended  EXT:  trace edema, non tender to palpation    Labs:   HELLP labs, UPC WNL     Lab Results   Component Value Date    ABO A 2021    RH Pos 2021    AS Negative 2023    HEPBANG Nonreactive 2023    CHPCRT Negative 2020    GCPCRT Negative 2020    HGB 10.7 (L) 2023       GBS Status:   Lab Results   Component Value Date    GBS Negative 2021         A/P: Rosibel Saeed is a 28 year old female  at 39w1d who presented for IOL. While getting ready for IOL patient was noted to have pale appearance with subsequent syncopal episode and per RNs seizure-like activity x2 (eyes rolling, convulsions) They report these lasted less than a minute and she was confused for 1-2 min after each episode. No tongue biting or urinary incontinence. During the episodes she was noted to be significantly hypotensive. With this her fetus was  noted to have bradycardia by RNs. Therefore RNs did call rapid response while awaiting MD to arrive at bedside. Given that the bradycardia was not improving, they did initiate readying for STAT CS delivery. Dr. Alvarado was in house for gynecologic procedure and responded to call for STAT delivery, and completed CS. Please see his operative note for details. Upon my arrival surgery was nearly completed. Given the patient's history, most concerned for vaso-vagal episode at this time. She did improve quickly after improvement in her blood pressures. It does sound as though she was briefly confused after the syncopal episodes but was not post-ictal for any prolonged duration. Her vitals have remained stable since. No focal deficits noted. She does not have signs of eclampsia with no elevated BPs, changes in HELLP labs or elevated UPC. Patient has no hx of epilepsy, does have fam hx with her mother. For now will continue with routine postpartum cares. Will attempt to contact neurology to see if they agree with assessment and if they have any other recommendations regarding work up such as imaging or EEG.    Mary Dunbar MD   OB/GYN   8/28/2023  10:08 AM

## 2023-08-28 NOTE — ANESTHESIA CARE TRANSFER NOTE
Patient: Rosibel Saeed    Procedure: Procedure(s):   SECTION       Diagnosis: * No pre-op diagnosis entered *  Diagnosis Additional Information: No value filed.    Anesthesia Type:   General     Note:    Oropharynx: oropharynx clear of all foreign objects and spontaneously breathing  Level of Consciousness: awake  Oxygen Supplementation: room air    Independent Airway: airway patency satisfactory and stable  Dentition: dentition unchanged  Vital Signs Stable: post-procedure vital signs reviewed and stable  Report to RN Given: handoff report given  Patient transferred to: PACU    Handoff Report: Identifed the Patient, Identified the Reponsible Provider, Reviewed the pertinent medical history, Discussed the surgical course, Reviewed Intra-OP anesthesia mangement and issues during anesthesia, Set expectations for post-procedure period and Allowed opportunity for questions and acknowledgement of understanding      Vitals:  Vitals Value Taken Time   BP     Temp     Pulse     Resp     SpO2         Electronically Signed By: Joel Santos CRNA, JACINDA PIERRE  2023  8:35 AM

## 2023-08-28 NOTE — PLAN OF CARE
S:Delivery  B:No Labor,39w1d    Lab Results   Component Value Date    GBS Negative 2021    with antibiotic treatment not indicated 4 hours prior to delivery.  A: Patient delivered Primary C/S for  fetal intolerance at 0747 with Dr. RYAN Alvarado in attendance and baby placed on mother's abdomen for delayed cord clamping. Baby received from surgeon. Baby to warmer for assessment/resusitative efforts. Apgars 4/8/9. Delivery QBL 525mL.  IV infusion of Oxytocin  infused. Placenta removal spontaneous and manual. MD does want placenta sent to pathology.  See Flowsheet for VS and PP checks.  .  Labor care plan goals met, transition now to postpartum care. Postpartum QBL pending.  R: Expect routine postpartum care. Anticipate first feeding within the hour or whenever infant displays feeding cues. Continue skin to skin. Prior discussion with mother indicates that feeding plan is Breast feeding . Educated mother on importance of exclusive breastfeeding, expected feeding readiness cues and encouraged her to observe for these cues while rooming in. Informed her that breastfeeding assistance would be provided.

## 2023-08-29 ENCOUNTER — HOSPITAL ENCOUNTER (INPATIENT)
Facility: CLINIC | Age: 28
LOS: 2 days | Discharge: HOME HEALTH | DRG: 776 | End: 2023-08-31
Attending: OBSTETRICS & GYNECOLOGY | Admitting: OBSTETRICS & GYNECOLOGY
Payer: COMMERCIAL

## 2023-08-29 VITALS
RESPIRATION RATE: 16 BRPM | OXYGEN SATURATION: 97 % | TEMPERATURE: 97.8 F | HEART RATE: 88 BPM | DIASTOLIC BLOOD PRESSURE: 73 MMHG | SYSTOLIC BLOOD PRESSURE: 103 MMHG

## 2023-08-29 DIAGNOSIS — Z30.011 ENCOUNTER FOR INITIAL PRESCRIPTION OF CONTRACEPTIVE PILLS: ICD-10-CM

## 2023-08-29 DIAGNOSIS — Z98.891 S/P PRIMARY LOW TRANSVERSE C-SECTION: ICD-10-CM

## 2023-08-29 DIAGNOSIS — Z98.891 S/P CESAREAN SECTION: Primary | ICD-10-CM

## 2023-08-29 LAB
HGB BLD-MCNC: 9.7 G/DL (ref 11.7–15.7)
HOLD SPECIMEN: NORMAL
PATH REPORT.COMMENTS IMP SPEC: NORMAL
PATH REPORT.COMMENTS IMP SPEC: NORMAL
PATH REPORT.FINAL DX SPEC: NORMAL
PATH REPORT.GROSS SPEC: NORMAL
PATH REPORT.MICROSCOPIC SPEC OTHER STN: NORMAL
PATH REPORT.RELEVANT HX SPEC: NORMAL
PHOTO IMAGE: NORMAL

## 2023-08-29 PROCEDURE — 36415 COLL VENOUS BLD VENIPUNCTURE: CPT | Performed by: OBSTETRICS & GYNECOLOGY

## 2023-08-29 PROCEDURE — 250N000013 HC RX MED GY IP 250 OP 250 PS 637: Performed by: OBSTETRICS & GYNECOLOGY

## 2023-08-29 PROCEDURE — 250N000013 HC RX MED GY IP 250 OP 250 PS 637

## 2023-08-29 PROCEDURE — 85018 HEMOGLOBIN: CPT | Performed by: OBSTETRICS & GYNECOLOGY

## 2023-08-29 PROCEDURE — 120N000002 HC R&B MED SURG/OB UMMC

## 2023-08-29 RX ORDER — AMOXICILLIN 250 MG
2 CAPSULE ORAL 2 TIMES DAILY
Qty: 30 TABLET | Refills: 1 | Status: SHIPPED | OUTPATIENT
Start: 2023-08-29

## 2023-08-29 RX ORDER — FERROUS SULFATE 325(65) MG
325 TABLET ORAL DAILY
Qty: 30 TABLET | Refills: 1 | Status: SHIPPED | OUTPATIENT
Start: 2023-08-29

## 2023-08-29 RX ORDER — OXYCODONE HYDROCHLORIDE 5 MG/1
5 TABLET ORAL
Qty: 12 TABLET | Refills: 0 | Status: ON HOLD | OUTPATIENT
Start: 2023-08-29 | End: 2023-08-31

## 2023-08-29 RX ORDER — AMOXICILLIN 250 MG
2 CAPSULE ORAL 2 TIMES DAILY
Status: DISCONTINUED | OUTPATIENT
Start: 2023-08-29 | End: 2023-08-31 | Stop reason: HOSPADM

## 2023-08-29 RX ORDER — TRANEXAMIC ACID 10 MG/ML
1 INJECTION, SOLUTION INTRAVENOUS EVERY 30 MIN PRN
Status: DISCONTINUED | OUTPATIENT
Start: 2023-08-29 | End: 2023-08-31 | Stop reason: HOSPADM

## 2023-08-29 RX ORDER — AMOXICILLIN 250 MG
1 CAPSULE ORAL 2 TIMES DAILY
Status: DISCONTINUED | OUTPATIENT
Start: 2023-08-29 | End: 2023-08-31 | Stop reason: HOSPADM

## 2023-08-29 RX ORDER — FERROUS SULFATE 325(65) MG
325 TABLET ORAL DAILY
Status: DISCONTINUED | OUTPATIENT
Start: 2023-08-29 | End: 2023-08-29 | Stop reason: HOSPADM

## 2023-08-29 RX ORDER — OXYCODONE HYDROCHLORIDE 5 MG/1
5 TABLET ORAL
Status: DISCONTINUED | OUTPATIENT
Start: 2023-08-29 | End: 2023-08-31 | Stop reason: HOSPADM

## 2023-08-29 RX ORDER — UREA 10 %
500 LOTION (ML) TOPICAL DAILY
Status: DISCONTINUED | OUTPATIENT
Start: 2023-08-29 | End: 2023-08-29 | Stop reason: HOSPADM

## 2023-08-29 RX ORDER — LIDOCAINE 40 MG/G
CREAM TOPICAL
Status: DISCONTINUED | OUTPATIENT
Start: 2023-08-29 | End: 2023-08-31 | Stop reason: HOSPADM

## 2023-08-29 RX ORDER — CARBOPROST TROMETHAMINE 250 UG/ML
250 INJECTION, SOLUTION INTRAMUSCULAR
Status: DISCONTINUED | OUTPATIENT
Start: 2023-08-29 | End: 2023-08-31 | Stop reason: HOSPADM

## 2023-08-29 RX ORDER — ONDANSETRON 4 MG/1
4 TABLET, ORALLY DISINTEGRATING ORAL EVERY 6 HOURS PRN
Status: DISCONTINUED | OUTPATIENT
Start: 2023-08-29 | End: 2023-08-31 | Stop reason: HOSPADM

## 2023-08-29 RX ORDER — IBUPROFEN 800 MG/1
800 TABLET, FILM COATED ORAL EVERY 6 HOURS
Qty: 30 TABLET | Refills: 1 | Status: SHIPPED | OUTPATIENT
Start: 2023-08-29

## 2023-08-29 RX ORDER — OXYTOCIN 10 [USP'U]/ML
10 INJECTION, SOLUTION INTRAMUSCULAR; INTRAVENOUS
Status: DISCONTINUED | OUTPATIENT
Start: 2023-08-29 | End: 2023-08-31 | Stop reason: HOSPADM

## 2023-08-29 RX ORDER — SIMETHICONE 80 MG
80 TABLET,CHEWABLE ORAL 4 TIMES DAILY PRN
Status: DISCONTINUED | OUTPATIENT
Start: 2023-08-29 | End: 2023-08-31 | Stop reason: HOSPADM

## 2023-08-29 RX ORDER — RIBOFLAVIN (VITAMIN B2) 100 MG
100 TABLET ORAL DAILY
Qty: 30 TABLET | Refills: 1 | Status: SHIPPED | OUTPATIENT
Start: 2023-08-29

## 2023-08-29 RX ORDER — PROCHLORPERAZINE MALEATE 10 MG
10 TABLET ORAL EVERY 6 HOURS PRN
Status: DISCONTINUED | OUTPATIENT
Start: 2023-08-29 | End: 2023-08-31 | Stop reason: HOSPADM

## 2023-08-29 RX ORDER — METHYLERGONOVINE MALEATE 0.2 MG/ML
200 INJECTION INTRAVENOUS
Status: DISCONTINUED | OUTPATIENT
Start: 2023-08-29 | End: 2023-08-31 | Stop reason: HOSPADM

## 2023-08-29 RX ORDER — OXYTOCIN/0.9 % SODIUM CHLORIDE 30/500 ML
340 PLASTIC BAG, INJECTION (ML) INTRAVENOUS CONTINUOUS PRN
Status: DISCONTINUED | OUTPATIENT
Start: 2023-08-29 | End: 2023-08-31 | Stop reason: HOSPADM

## 2023-08-29 RX ORDER — PRENATAL VIT/IRON FUM/FOLIC AC 27MG-0.8MG
1 TABLET ORAL DAILY
Status: DISCONTINUED | OUTPATIENT
Start: 2023-08-30 | End: 2023-08-31 | Stop reason: HOSPADM

## 2023-08-29 RX ORDER — MISOPROSTOL 200 UG/1
400 TABLET ORAL
Status: DISCONTINUED | OUTPATIENT
Start: 2023-08-29 | End: 2023-08-31 | Stop reason: HOSPADM

## 2023-08-29 RX ORDER — ACETAMINOPHEN 325 MG/1
975 TABLET ORAL EVERY 6 HOURS
Status: DISCONTINUED | OUTPATIENT
Start: 2023-08-29 | End: 2023-08-31 | Stop reason: HOSPADM

## 2023-08-29 RX ORDER — PROCHLORPERAZINE 25 MG
25 SUPPOSITORY, RECTAL RECTAL EVERY 12 HOURS PRN
Status: DISCONTINUED | OUTPATIENT
Start: 2023-08-29 | End: 2023-08-31 | Stop reason: HOSPADM

## 2023-08-29 RX ORDER — CETIRIZINE HYDROCHLORIDE 10 MG/1
10 TABLET ORAL DAILY
Status: DISCONTINUED | OUTPATIENT
Start: 2023-08-30 | End: 2023-08-31 | Stop reason: HOSPADM

## 2023-08-29 RX ORDER — ASCORBIC ACID 250 MG
125 TABLET ORAL DAILY
Status: DISCONTINUED | OUTPATIENT
Start: 2023-08-29 | End: 2023-08-29 | Stop reason: HOSPADM

## 2023-08-29 RX ORDER — KETOROLAC TROMETHAMINE 30 MG/ML
30 INJECTION, SOLUTION INTRAMUSCULAR; INTRAVENOUS EVERY 6 HOURS
Status: ACTIVE | OUTPATIENT
Start: 2023-08-30 | End: 2023-08-30

## 2023-08-29 RX ORDER — IBUPROFEN 800 MG/1
800 TABLET, FILM COATED ORAL EVERY 6 HOURS
Status: DISCONTINUED | OUTPATIENT
Start: 2023-08-29 | End: 2023-08-31 | Stop reason: HOSPADM

## 2023-08-29 RX ORDER — METOCLOPRAMIDE 10 MG/1
10 TABLET ORAL EVERY 6 HOURS PRN
Status: DISCONTINUED | OUTPATIENT
Start: 2023-08-29 | End: 2023-08-31 | Stop reason: HOSPADM

## 2023-08-29 RX ORDER — ACETAMINOPHEN 325 MG/1
975 TABLET ORAL EVERY 6 HOURS
COMMUNITY
Start: 2023-08-29

## 2023-08-29 RX ORDER — METOCLOPRAMIDE HYDROCHLORIDE 5 MG/ML
10 INJECTION INTRAMUSCULAR; INTRAVENOUS EVERY 6 HOURS PRN
Status: DISCONTINUED | OUTPATIENT
Start: 2023-08-29 | End: 2023-08-31 | Stop reason: HOSPADM

## 2023-08-29 RX ORDER — HYDROCORTISONE 25 MG/G
CREAM TOPICAL 3 TIMES DAILY PRN
Status: DISCONTINUED | OUTPATIENT
Start: 2023-08-29 | End: 2023-08-31 | Stop reason: HOSPADM

## 2023-08-29 RX ORDER — MISOPROSTOL 200 UG/1
800 TABLET ORAL
Status: DISCONTINUED | OUTPATIENT
Start: 2023-08-29 | End: 2023-08-31 | Stop reason: HOSPADM

## 2023-08-29 RX ORDER — DEXTROSE, SODIUM CHLORIDE, SODIUM LACTATE, POTASSIUM CHLORIDE, AND CALCIUM CHLORIDE 5; .6; .31; .03; .02 G/100ML; G/100ML; G/100ML; G/100ML; G/100ML
INJECTION, SOLUTION INTRAVENOUS CONTINUOUS
Status: DISCONTINUED | OUTPATIENT
Start: 2023-08-29 | End: 2023-08-31 | Stop reason: HOSPADM

## 2023-08-29 RX ORDER — BISACODYL 10 MG
10 SUPPOSITORY, RECTAL RECTAL DAILY PRN
Status: DISCONTINUED | OUTPATIENT
Start: 2023-08-31 | End: 2023-08-31 | Stop reason: HOSPADM

## 2023-08-29 RX ORDER — ONDANSETRON 2 MG/ML
4 INJECTION INTRAMUSCULAR; INTRAVENOUS EVERY 6 HOURS PRN
Status: DISCONTINUED | OUTPATIENT
Start: 2023-08-29 | End: 2023-08-31 | Stop reason: HOSPADM

## 2023-08-29 RX ORDER — MODIFIED LANOLIN
OINTMENT (GRAM) TOPICAL
Status: DISCONTINUED | OUTPATIENT
Start: 2023-08-29 | End: 2023-08-31 | Stop reason: HOSPADM

## 2023-08-29 RX ADMIN — IBUPROFEN 800 MG: 800 TABLET ORAL at 16:55

## 2023-08-29 RX ADMIN — ACETAMINOPHEN 975 MG: 325 TABLET, FILM COATED ORAL at 16:55

## 2023-08-29 RX ADMIN — ACETAMINOPHEN 975 MG: 325 TABLET, FILM COATED ORAL at 05:26

## 2023-08-29 RX ADMIN — SENNOSIDES AND DOCUSATE SODIUM 2 TABLET: 50; 8.6 TABLET ORAL at 22:35

## 2023-08-29 RX ADMIN — ACETAMINOPHEN 975 MG: 325 TABLET, FILM COATED ORAL at 22:34

## 2023-08-29 RX ADMIN — IBUPROFEN 800 MG: 800 TABLET ORAL at 22:34

## 2023-08-29 RX ADMIN — ACETAMINOPHEN 975 MG: 325 TABLET, FILM COATED ORAL at 11:24

## 2023-08-29 RX ADMIN — SENNOSIDES AND DOCUSATE SODIUM 1 TABLET: 50; 8.6 TABLET ORAL at 10:35

## 2023-08-29 RX ADMIN — IBUPROFEN 800 MG: 800 TABLET ORAL at 10:35

## 2023-08-29 ASSESSMENT — ACTIVITIES OF DAILY LIVING (ADL)
ADLS_ACUITY_SCORE: 18
ADLS_ACUITY_SCORE: 35
ADLS_ACUITY_SCORE: 18
ADLS_ACUITY_SCORE: 26
ADLS_ACUITY_SCORE: 18
ADLS_ACUITY_SCORE: 26
ADLS_ACUITY_SCORE: 26
ADLS_ACUITY_SCORE: 18
ADLS_ACUITY_SCORE: 18
ADLS_ACUITY_SCORE: 26
ADLS_ACUITY_SCORE: 26
ADLS_ACUITY_SCORE: 18

## 2023-08-29 NOTE — PROGRESS NOTES
[facility] OB/GYN Department    Post-Partum Progress Note: PPD #1    Name: Rosibel Saeed  Date: 8/29/2023    Subjective:   Patient seen and examined.  No complaints.  Pain well controlled.  Tolerating regular diet, no nausea or vomiting.  Ambulating and voiding without difficulty.  Lochia mod.  Breast feeding.  No headache, visual disturbance or RUQ pain.    ROS:    General/Constitutional:  Denies chills or fever  Respiratory: Denies shortness of breath  Cardiovascular: Denies chest pain  Gastrointestinal:  +mild uterine cramping, no nausea or vomiting  Genitourinary: Denies difficulty urinating  Musculoskeletal: Denies  peripheral edema      Objective:     Intake/Output Summary (Last 24 hours) at 8/29/2023 1224  Last data filed at 8/29/2023 0600  Gross per 24 hour   Intake 500 ml   Output --   Net 500 ml       Patient Vitals for the past 24 hrs:   BP Temp Temp src Pulse Resp SpO2   08/29/23 1030 -- 97.6  F (36.4  C) Oral 88 16 96 %   08/29/23 0552 123/81 97.9  F (36.6  C) Oral 75 16 --   08/29/23 0027 105/67 98.1  F (36.7  C) Oral 75 16 95 %   08/28/23 2130 115/61 -- -- 77 -- --   08/28/23 2126 103/53 -- -- 77 -- --   08/28/23 1954 -- -- -- -- -- 94 %   08/28/23 1821 115/73 -- -- 85 -- --   08/28/23 1715 108/69 98.3  F (36.8  C) Oral 84 16 --   08/28/23 1620 112/72 -- -- 83 16 --   08/28/23 1615 -- -- -- 84 -- --   08/28/23 1500 119/72 98.3  F (36.8  C) -- -- 18 --   08/28/23 1410 113/69 -- -- 85 16 --   08/28/23 1300 116/82 98.4  F (36.9  C) -- 88 16 --          Recent Labs   Lab 08/29/23  0554 08/28/23  0929   HGB 9.7* 10.7*       General appearance: well-hydrated, A&O x 3, no apparent distress  ENT: EOMI, sclera anicteric   Lungs: Equal expansion bilaterally, no accessory muscle use  Heart: No heaves or thrills. No peripheral varicosities  Constitutional: See vitals  Abdomen: Soft, non-distended, no rebound or rigidity   Uterus: Firm at umbilicus with non-tender fundus   Neurologic: CN II-XII  grossly intact, no lateralizing defects, no gross movement abnormalities  Extremities: trace edema, no calf tenderness    Assessment and Plan:    28 year old   s/p stat  delivery  Seizure-like activity prior to  section with hypotensive episode.  No signs of gestational hypertensive disorders.  No seizure like activity since.  Continue observation.  Anemia, chronic with acute anemia due to blood loss at surgery. Will start iron with vitamin C and B12.  Possible discharge tomorrow.  Discharge prescriptions for iron, vitamin C, B12, senna-docusate, ibuprofen, tylenol and oxycodone sent to pharmacy today.    Anali Swann MD

## 2023-08-29 NOTE — PLAN OF CARE
Goal Outcome Evaluation:    Assessments as charted. B/P: 123/81, T: 97.9, P: 75, R: 16. Rates pain: 2-3/10 at incision site. Incision: Healing well, well approximated, without signs of infection, and no drainage. Voiding without difficulty. Fundus firm, midline, U/1. Lochia: Light. Activity: moving with difficulty due to post op surgical pain. Pt reports pain is well controlled with scheduled Tylenol and Ibuprofen, see MAR.  Infant feeding: Both breast and formula, per maternal choice.  Pt has been up independently and is voiding without difficulty.           Postpartum breastfeeding assessment completed and education provided, see Patient Education Activity.  Items included in the education are:   proper positioning and latch  effectiveness of feeding  manual expression  handling and storing breastmilk  maintenance of breastfeeding for the first 6 months  sign/symptoms of infant feeding issues requiring referral to qualified health care provider  Postpartum care education provided, see Patient Education activity. Patient denies needs at this time and is resting in bed.

## 2023-08-29 NOTE — PLAN OF CARE
Report given to [place] charge nurse. Ambulance called for transport.

## 2023-08-29 NOTE — PROGRESS NOTES
Pt is stable. Infant was transferred to [facility]. Pt is transferring as well. Pt was updated and aware. Transport paperwork received, orders received. Plan to transfer around 1900.

## 2023-08-29 NOTE — PROGRESS NOTES
Pt is walking around the room and tolerating the activity well.  She is voiding and her pain is relieved with tylenol and ibuprofen.  Pt is aware that she can have a stronger pain med if needed.

## 2023-08-29 NOTE — DISCHARGE SUMMARY
[facility]  Delivery Discharge Summary    Admit date: 2023  Transfer date: 2023     Accepting Hospital: [facility]  Accepting Doctor: Dr. Lana Lucero  Reason for transfer: baby transferred to NICU    Admit Dx:   - 28 year old y/o  at 39w1d   - planned induction of labor    Discharge Dx:  - transient loss of consciousness associated with hypotensive episode  -s/p stat low transverse  section for fetal bradycardia, not present on admission  -chronic anemia worsened by acute anemia secondary to blood loss, started on iron, vitamin C and vitamin B12, not present on admission      Procedures:  - Stat Primary low transverse  section   -CT of Head without contrast      Admit HPI:    Rosibel Saeed  presented for the induction of labor at 39 weeks by maternal request.  Her pregnancy was uncomplicated.  She was a  2 para 1-0-0-1.      While getting ready for IOL patient was noted to have pale appearance with subsequent syncopal episode and per RNs seizure-like activity x2 (eyes rolling, convulsions) They report these lasted less than a minute and she was confused for 1-2 min after each episode. No tongue biting or urinary incontinence. During the episodes she was noted to be significantly hypotensive. With this her fetus was noted to have bradycardia by RNs. Therefore RNs did call rapid response while awaiting MD to arrive at bedside. Given that the bradycardia was not improving, they did initiate readying for STAT CS delivery. Dr. Alvarado was in house for gynecologic procedure and responded to call for STAT delivery, and completed CS. Please see his operative note for details.     Given the patient's history, most concerned for vaso-vagal episode at this time. She did improve quickly after improvement in her blood pressures. It does sound as though she was briefly confused after the syncopal episodes but was not post-ictal for any prolonged duration. Her  "vitals had remained stable since. No focal deficits noted. She did not have signs of eclampsia with no elevated BPs, changes in HELLP labs or elevated UPC. Patient has no hx of epilepsy, does have fam hx with her mother.     Later the patient stated she remembers coming in for induction, feeling a little nauseous during drive. On arrival felt ok and was in room when they started putting monitors on patient states she began feeling dizzy, noticing her vision darkening, feeling nauseous and then passing out. She remembers waking with an O2 mask on and then passing out again. When she awoke next she remembers noticing she vomited and being wheeled to the OR for surgery.      Dr. Alvarado and Dr. Dunbar were called stat for a  delivery.     Upon arrival of Dr. Alvarado, who was called emergently from the OR, the fetal heart tones were in the 60s.  He briefly informed her of his plan to do a  delivery and that she would be going to sleep.  She expressed understanding.   Dr. Dunbar arrived and later took over care. Her  section itself was uncomplicated.    Dr. Dunbar reviewed pt history with on call neurology team. Relayed events surrounding syncope, hypotension and possible seizure activity as well as time frame based on patient's memory and RNs description. After discussion, neurology team does feel less likely to be true seizure activity given description of events and \"post ictal\" time of only 1-2 minutes. He feels more likely related to be syncopal convulsions from hypotension. Neuro does recommend postpartum visit with them in 4-6w to formally review. In the interval, he said head CT is not unreasonable while in patient. CT scan showed no acute changes. Likely too late to complete a lactate as it had been 4 hours since event and may be confounded with interval surgery. Continued close monitoring. No signs of ecclampsia. Vital stable, WNL. HELLP labs and UPC WNL.     Indications " for : fetal bradycardia     QBL from the delivery was 634 ml. Operative findings included: Live   Female  Cephalic presentation:  left occiput anterior  APGARS: 1 minute: 4   5 minute: 8 10-minute: 9  Weight: 9 pounds  Placenta: Removed manually and sent for pathologic evaluation  Cord pH: Arterial pH 7.06 and base excess of -10.6  Venous pH 7.13 with a base excess of -9.3    Please see her  Section Operative Note for full details regarding her delivery.    Her postoperative course was otherwise uncomplicated. On POD#1,  She was voiding without difficulty, tolerating a regular diet without nausea and vomiting, her pain was well controlled on oral pain medicines and her lochia was appropriate. Her hemoglobin after delivery was 9.7.  she was started on iron, vitamin C and vitamin B12.  Her Rh status was pos and Rhogam was not indicated. At the time of transfer, she was breast feeding her infant. Her [place] required transfer to a higher level of care, so mother was transferred to stay with the .    Physical exam on the day of discharge:  Vitals:    23 2130 23 0027 23 0552 23 1030   BP: 115/61 105/67 123/81    BP Location:   Left arm    Patient Position: Standing  Semi-Lloyd's    Cuff Size:       Pulse: 77 75 75 88   Resp:  16 16 16   Temp:  98.1  F (36.7  C) 97.9  F (36.6  C) 97.6  F (36.4  C)   TempSrc:  Oral Oral Oral   SpO2:  95%  96%       General: sitting up, alert and cooperative  Abd: soft, non-distended, non-tender. Fundus firm, nontender, at umbilicus.   Incision clean/dry/intact with dermabond in place.  Extremities: calves nontender, neg edema of lower extremities bilaterally    Lab Results   Component Value Date    HGB 9.7 2023    HGB 10.7 2023    HGB 11.9 2021    HGB 12.6 2020     Blood type:   Lab Results   Component Value Date    RH Pos 2021       transfer:  Rosibel Saeed was transferred to [facility] to Dr. Schwartz  Nic in stable condition with the following instructions/medications:    Her discharged home medications were sent to her pharmacy prior to transer:   iron, vitamin C, vitamin B12, ibuprofen, senna-docusate, tylenol and oxycodone.     Anali Swann MD   [facility] OB/Gyn

## 2023-08-30 ENCOUNTER — HOSPITAL ENCOUNTER (OUTPATIENT)
Facility: CLINIC | Age: 28
End: 2023-08-30
Attending: STUDENT IN AN ORGANIZED HEALTH CARE EDUCATION/TRAINING PROGRAM | Admitting: STUDENT IN AN ORGANIZED HEALTH CARE EDUCATION/TRAINING PROGRAM
Payer: COMMERCIAL

## 2023-08-30 LAB — HGB BLD-MCNC: 10.3 G/DL (ref 11.7–15.7)

## 2023-08-30 PROCEDURE — 36415 COLL VENOUS BLD VENIPUNCTURE: CPT

## 2023-08-30 PROCEDURE — 250N000013 HC RX MED GY IP 250 OP 250 PS 637

## 2023-08-30 PROCEDURE — 85018 HEMOGLOBIN: CPT

## 2023-08-30 PROCEDURE — 120N000002 HC R&B MED SURG/OB UMMC

## 2023-08-30 RX ADMIN — ACETAMINOPHEN 975 MG: 325 TABLET, FILM COATED ORAL at 20:44

## 2023-08-30 RX ADMIN — ACETAMINOPHEN 975 MG: 325 TABLET, FILM COATED ORAL at 05:00

## 2023-08-30 RX ADMIN — PRENATAL VIT W/ FE FUMARATE-FA TAB 27-0.8 MG 1 TABLET: 27-0.8 TAB at 08:05

## 2023-08-30 RX ADMIN — ACETAMINOPHEN 975 MG: 325 TABLET, FILM COATED ORAL at 13:09

## 2023-08-30 RX ADMIN — CETIRIZINE HYDROCHLORIDE 10 MG: 10 TABLET, FILM COATED ORAL at 08:05

## 2023-08-30 RX ADMIN — IBUPROFEN 800 MG: 800 TABLET ORAL at 13:09

## 2023-08-30 RX ADMIN — IBUPROFEN 800 MG: 800 TABLET ORAL at 04:52

## 2023-08-30 RX ADMIN — IBUPROFEN 800 MG: 800 TABLET ORAL at 20:44

## 2023-08-30 RX ADMIN — SENNOSIDES AND DOCUSATE SODIUM 2 TABLET: 50; 8.6 TABLET ORAL at 20:44

## 2023-08-30 ASSESSMENT — ACTIVITIES OF DAILY LIVING (ADL)
ADLS_ACUITY_SCORE: 18

## 2023-08-30 NOTE — PROVIDER NOTIFICATION
08/29/23 2224   Provider Notification   Provider Name/Title G2 Alvaro   Method of Notification Electronic Page   Request Evaluate in Person   Notification Reason Status Update     New Transfer pt back in room from NICU for admission assessment

## 2023-08-30 NOTE — PROGRESS NOTES
Received order for SW to see for NICU psychosocial assessment.    Chart reviewed and consulted with NICU providers.    Baby is doing well in NICU and is anticipated to transition to [facility] and parent's care today.      No indication for NICU psychosocial assessment at this time.    Order closed.    Please refer again if social work needs are identified.    GEORGIA Pryor [facility]  Clinical   Maternal Child Health  Phone:  [phone]  Pager:  [phone]

## 2023-08-30 NOTE — DISCHARGE SUMMARY
[facility] Discharge Summary    Patient: Rosibel Saeed    YOB: 1995   MRN# [id number]           Date of Admission:  8/29/2023  Date of Discharge::  8/31/2023   Admitting Physician:  Lana Lucero MD  Discharge Physician:  iKzzy Mckeon MD              Admission Diagnoses:   - POD#1 s/p STAT CS  - Transient Loss of consciousness  - Chronic Anemia  - Baby in NICU          Discharge Diagnosis:   - POD#3          Procedures:   - N/a           Medications Prior to Admission:     Medications Prior to Admission   Medication Sig Dispense Refill Last Dose    acetaminophen (TYLENOL) 325 MG tablet Take 3 tablets (975 mg) by mouth every 6 hours       cetirizine (ZYRTEC) 10 MG tablet Take 10 mg by mouth daily       cyanocobalamin (VITAMIN B-12) 500 MCG SUBL sublingual tablet Place 1 tablet (500 mcg) under the tongue daily 30 tablet 1     ferrous sulfate (FEROSUL) 325 (65 Fe) MG tablet Take 1 tablet (325 mg) by mouth daily 30 tablet 1     ibuprofen (ADVIL/MOTRIN) 800 MG tablet Take 1 tablet (800 mg) by mouth every 6 hours 30 tablet 1     Misc. Devices (BREAST PUMP) MISC 1 each See Admin Instructions 1 each 0     oxyCODONE (ROXICODONE) 5 MG tablet Take 1 tablet (5 mg) by mouth once as needed for moderate pain 12 tablet 0     Prenatal Vit-Fe Fumarate-FA (PRENATAL MULTIVITAMIN W/IRON) 27-0.8 MG tablet Take 1 tablet by mouth daily       senna-docusate (SENOKOT-S/PERICOLACE) 8.6-50 MG tablet Take 2 tablets by mouth 2 times daily 30 tablet 1     vitamin C (ASCORBIC ACID) 100 MG tablet Take 1 tablet (100 mg) by mouth daily 30 tablet 1     [DISCONTINUED] ferrous fumarate 65 mg, [place]. FE,-Vitamin C 125 mg (VITRON C)  MG TABS tablet Take 1 tablet by mouth daily 30 tablet 1              Discharge Medications:        Review of your medicines        UNREVIEWED medicines. Ask your doctor about these medicines        Dose / Directions   oxyCODONE 5 MG  tablet  Commonly known as: ROXICODONE  Used for: S/P primary low transverse       Dose: 5 mg  Take 1 tablet (5 mg) by mouth once as needed for moderate pain  Quantity: 12 tablet  Refills: 0            START taking        Dose / Directions   norethindrone 0.35 MG tablet  Commonly known as: MICRONOR  Used for: Encounter for initial prescription of contraceptive pills      Dose: 0.35 mg  Take 1 tablet (0.35 mg) by mouth daily  Quantity: 90 tablet  Refills: 3            CONTINUE these medicines which have NOT CHANGED        Dose / Directions   acetaminophen 325 MG tablet  Commonly known as: TYLENOL  Used for: S/P primary low transverse       Dose: 975 mg  Take 3 tablets (975 mg) by mouth every 6 hours  Refills: 0     breast pump Misc  Used for: Prenatal care in third trimester      Dose: 1 each  1 each See Admin Instructions  Quantity: 1 each  Refills: 0     cetirizine 10 MG tablet  Commonly known as: zyrTEC      Dose: 10 mg  Take 10 mg by mouth daily  Refills: 0     cyanocobalamin 500 MCG Subl sublingual tablet  Commonly known as: VITAMIN B-12  Used for: S/P primary low transverse       Dose: 500 mcg  Place 1 tablet (500 mcg) under the tongue daily  Quantity: 30 tablet  Refills: 1     ferrous sulfate 325 (65 Fe) MG tablet  Commonly known as: FEROSUL  Used for: Iron deficiency anemia secondary to inadequate dietary iron intake      Dose: 325 mg  Take 1 tablet (325 mg) by mouth daily  Quantity: 30 tablet  Refills: 1     ibuprofen 800 MG tablet  Commonly known as: ADVIL/MOTRIN  Used for: S/P primary low transverse       Dose: 800 mg  Take 1 tablet (800 mg) by mouth every 6 hours  Quantity: 30 tablet  Refills: 1     prenatal multivitamin w/iron 27-0.8 MG tablet      Dose: 1 tablet  Take 1 tablet by mouth daily  Refills: 0     senna-docusate 8.6-50 MG tablet  Commonly known as: SENOKOT-S/PERICOLACE  Used for: S/P primary low transverse       Dose: 2 tablet  Take 2 tablets by  mouth 2 times daily  Quantity: 30 tablet  Refills: 1     vitamin C 100 MG tablet  Commonly known as: ASCORBIC ACID  Used for: Iron deficiency anemia secondary to inadequate dietary iron intake      Dose: 100 mg  Take 1 tablet (100 mg) by mouth daily  Quantity: 30 tablet  Refills: 1            STOP taking      ferrous fumarate 65 mg ([place]. FE)-Vitamin C 125 mg  MG Tabs tablet  Commonly known as: VITRON C                  Where to get your medicines        These medications were sent to [place], [place] - [address]  [address] [address]      Phone: [phone]   norethindrone 0.35 MG tablet               Consultations:   - NICU  - Lactation           Brief Admission History:   Ms. Rosibel Saeed is a 28 year old   who initially presented at 39w1d as a transfer of care from [facility] due to baby requiring higher order of care.  Prior to presentation patient underwent a stat primary  section under general anesthesia due to fetal bradycardia.  Just prior to stat  section patient was noted to have seizure-like activity/syncopal event.  Following  section patient was evaluated by [facility] neurology team and head CT was obtained.  No notable findings.  Patient was instructed to follow-up with neurology as an outpatient.       Postpartum Course   The patient's hospital course was unremarkable.  She recovered as anticipated and experienced no post-operative complications.  On discharge, her pain was well controlled. Vaginal bleeding is lighter than  peak menstrual flow.  Voiding without difficulty.  Ambulating well and tolerating a normal diet.  No fever or significant wound drainage.  Infant is stable. She was discharged on post-partum day #3.    Post-partum hemoglobin:   Hemoglobin   Date Value Ref Range Status   2023 10.3 (L) 11.7 - 15.7 g/dL Final   2021 11.9 11.7 - 15.7 g/dL Final              Discharge Instructions and Follow-Up:     Discharge diet: Regular   Discharge activity: No lifting greater than 20 lbs, pushing, pulling, or other strenuous activity for 6 weeks. Pelvic rest for 6 weeks including no sexual intercourse, tampons, or douching. No driving until you can slam on the breaks without pain or while on narcotic pain medications.    Discharge follow-up: Follow up with primary OB for incision check in 2 weeks, and routine postpartum visit in 6 weeks.   Wound care: Keep incision clean and dry           Discharge Disposition:     Discharged to home      Shelia Garcia MD  OB/GYN PGY-2  8/31/2023 8:05 AM      Appreciate note by Dr. Garcia. Patient has been seen and examined by me separate from the resident, agree with above note.     Kizzy Mckeon MD  9:51 AM

## 2023-08-30 NOTE — PROGRESS NOTES
Pt transferred to [facility] via Ambulance at 1955. All VSS and pt reported feeling comfortable and well. Report given to EMS. [facility] called and aware of pt departure.

## 2023-08-30 NOTE — PROGRESS NOTES
Brief Progress Note    Went to see patient and gather H&P. Patient currently in NICU. Will revisit once patient is back in room.    Kylee John MD  Ob/Gyn Resident, PGY-2  8/29/2023 10:04 PM

## 2023-08-30 NOTE — PLAN OF CARE
Goal Outcome Evaluation:  Problem: Plan of Care - These are the overarching goals to be used throughout the patient stay.    Goal: Optimal Comfort and Wellbeing  Intervention: Provide Person-Centered Care  Recent Flowsheet Documentation  Taken 8/30/2023 0808 by Lizzie Gomez, RN  Trust Relationship/Rapport:   care explained   choices provided   emotional support provided   questions encouraged   reassurance provided   thoughts/feelings acknowledged       VAA, postpartum assessment WDL.  Scant amount of lochia, fundus firm and below U.  Denies any Pre-E symptoms.  Tolerating PO, +BM, voiding w/o difficulties.  Minimal pain, taking scheduled medications.  Incision CDI and DUGLAS, no S/S of infection.  Breastfeeding infant and pumping, producing good puddles of colostrum. EDS score 4. Videos watched.   at bedside and supportive.  Both bonding well with infant.  Continue with education and POC, tentatively discharging tomorrow.                          L/M for pts to call us back for more information.

## 2023-08-30 NOTE — PLAN OF CARE
"  Problem: Plan of Care - These are the overarching goals to be used throughout the patient stay.    Goal: Absence of Hospital-Acquired Illness or Injury  Intervention: Prevent Skin Injury  Recent Flowsheet Documentation  Taken 8/29/2023 2050 by KARIME BARRETT  Body Position: position changed independently  Goal: Readiness for Transition of Care  Intervention: Mutually Develop Transition Plan  Recent Flowsheet Documentation  Taken 8/29/2023 2200 by KARIME BARRETT  Equipment Currently Used at Home: none   Goal Outcome Evaluation:    Shift [id number]    VSS. Voiding WNL. Taking tylenol and motrin. Bonding well with infant in NICU. Patient is planning to pump, encouraged to call RN when ready to initiate pumping. Pumping was not initiated this shift. Patient slept throughout majority of shift, stating that she needed \"nothing other than sleep\".                       "

## 2023-08-30 NOTE — H&P
OB History and Physical     Documentation delayed due to acute patient care and Epic downtime.    Rosibel Saeed    MRN# [id number]  YOB: 1995      HPI: Rosibel Saeed is a 28 year old  who is POD#2 from a STAT PLTCS with GETA for fetal bradycardia who was transferred from [facility] for routine postpartum care given her baby was transferred here for NICU assistance. She just visited baby in the NICU and states baby is stable. Patient is feeling well, though tired. States her pregnancy was uncomplicated, denies gHTN or GDM. Her first pregnancy and delivery were uncomplicated. She initially presented to [facility] for induction of labor. As nurses were getting her situated and getting her IV placed, she had an episode of transient loss of consciousness. States the next thing she remembers is waking up with an oxygen mask that had vomitus in it. Denies noticing bowel or bladder incontinence after this episode. States she had a similar episode with her first pregnancy, though she did not completely lose consciousness like she did this time. She denies SOB, chest pain, N/V, LE swelling/tenderness.  No concerns for headache, vision changes, RUQ or epigastric pain. No acute concerns.    Prenatal Labs:   Lab Results   Component Value Date    ABO A 2021    RH Pos 2021    AS Negative 2023    HEPBANG Nonreactive 2023    CHPCRT Negative 2020    GCPCRT Negative 2020    HGB 9.7 (L) 2023       OBHX: (Reviewed)  OB History    Para Term  AB Living   2 2 2 0 0 2   SAB IAB Ectopic Multiple Live Births   0 0 0 0 2      # Outcome Date GA Lbr Og/2nd Weight Sex Delivery Anes PTL Lv   2 Term 23 39w1d  4.08 kg (8 lb 15.9 oz) F CS-LTranv Gen N FREDA      Name: Leatha      Apgar1: 4  Apgar5: 8   1 Term 21 41w0d 06:30 / 02:54 3.685 kg (8 lb 2 oz) M Vag-Spont EPI N FREDA      Name: King      Apgar1: 8  Apgar5: 9       MedicalHX:  (Reviewed)  Past Medical History:   Diagnosis Date    Chickenpox        SurgicalHX: (Reviewed)  Past Surgical History:   Procedure Laterality Date    ADENOIDECTOMY       SECTION N/A 2023    Procedure:  SECTION;  Surgeon: Rafat Alvarado MD;  Location: [facility] OR    LAPAROSCOPIC APPENDECTOMY      MOUTH SURGERY         Medications:  (Reviewed)  No current facility-administered medications on file prior to encounter.  acetaminophen (TYLENOL) 325 MG tablet, Take 3 tablets (975 mg) by mouth every 6 hours  cetirizine (ZYRTEC) 10 MG tablet, Take 10 mg by mouth daily  cyanocobalamin (VITAMIN B-12) 500 MCG SUBL sublingual tablet, Place 1 tablet (500 mcg) under the tongue daily  ferrous fumarate 65 mg, [place]. FE,-Vitamin C 125 mg (VITRON C)  MG TABS tablet, Take 1 tablet by mouth daily  ferrous sulfate (FEROSUL) 325 (65 Fe) MG tablet, Take 1 tablet (325 mg) by mouth daily  ibuprofen (ADVIL/MOTRIN) 800 MG tablet, Take 1 tablet (800 mg) by mouth every 6 hours  Misc. Devices (BREAST PUMP) MISC, 1 each See Admin Instructions  oxyCODONE (ROXICODONE) 5 MG tablet, Take 1 tablet (5 mg) by mouth once as needed for moderate pain  Prenatal Vit-Fe Fumarate-FA (PRENATAL MULTIVITAMIN W/IRON) 27-0.8 MG tablet, Take 1 tablet by mouth daily  senna-docusate (SENOKOT-S/PERICOLACE) 8.6-50 MG tablet, Take 2 tablets by mouth 2 times daily  vitamin C (ASCORBIC ACID) 100 MG tablet, Take 1 tablet (100 mg) by mouth daily        Allergies: (Reviewed)  Allergies   Allergen Reactions    Shellfish-Derived Products Other (See Comments)     Vomiting      Amoxicillin Rash       FamilyHX:  Reports seizure disorder in mother, for which her mother takes medications (patient unsure which medication), also reports seizure disorder in maternal aunt.  Family History   Problem Relation Age of Onset    Seizure Disorder Mother     Spina bifida Mother     Colon Cancer Mother     Diabetes Mother         type II    No Known Problems Father      Cerebrovascular Disease Maternal Grandmother     Bleeding Disorder Maternal Grandmother         ?history of blood clots    Hypertension Maternal Grandfather        SocialHX: (Reviewed)  Social History     Socioeconomic History    Marital status: Single   Tobacco Use    Smoking status: Never    Smokeless tobacco: Never   Vaping Use    Vaping Use: Never used   Substance and Sexual Activity    Alcohol use: Not Currently     Comment: occas-quit with pregnancy    Drug use: Never    Sexual activity: Yes     Partners: Male     Birth control/protection: Condom   Other Topics Concern    Parent/sibling w/ CABG, MI or angioplasty before 65F 55M? No       ROS: ROS negative other than above    Physical Exam:  Patient Vitals for the past 24 hrs:   BP Temp Temp src Pulse Resp   23 2050 125/80 98.2  F (36.8  C) Oral 80 18     General: Appropriately interactive, NAD, appears generally well  Head: No evident trauma to head, tongue intact without bite marks or bleeding  CV: RRR, normal S1/S2, no m/r/g  Lungs: CTAB, non-labored breathing, no wheezes, rales, or rhonchi  Abdomen: soft, non-tender, non distended, pfannenstiel incision clean, dry, intact with Dermabond in place. Fundus firm at the level of the umbilicus.  Neuro: AAO x 3, CNs 2-8, 11, 12 intact; other cranial nerves not frankly assessed, Strength and sensation wnl in bilateral upper and lower extremities. Finger-to-nose and heel-to-shin testing within normal limits bilaterally.   Extremities: Non-tender with no edema bilaterally in LE    Assessment & Plan: 28 year old  transferred here for routine postpartum care while baby is being cared for in the NICU. She delivered via STAT PLTCS under GETA for fetal bradycardia. She is tired but is otherwise doing well. Physical exam and history more suggestive of vasovagal episode rather than seizure. Patient has never been diagnosed with seizure disorder, however given family history of seizures, will continue to monitor  closely for similar symptoms. Neurology consult if needed. Admit for routine post partum and post operative care.    # Postpartum Care  - Encourage routine post-operative goals including ambulation and incentive spirometry  - PNC: Rh positive. Rubella immune. No intervention indicated.  - Pain: controlled on oral medications  - Heme: Hgb 10.7>>>10.7.  - GI: continue anti-emetics and stool softeners as needed.  - : Voiding spontaneously.  - Infant: Stable in NICU  - Feeding: Plans on breast feeding, plans to pump while baby is in NICU  - BC: Will discuss    Patient discussed with Dr. Abrams    Dispo: Plan discharge POD#3    Kylee John MD  OBGYN PGY-2  August 30, 2023 3:29 AM    Staff MD Note    I appreciate the note by Dr. John.  Any necessary changes have been made by me.  I saw and evaluated the patient and agree with the findings and plan of care as documented in the note.    Karley Abrams MD

## 2023-08-30 NOTE — PROVIDER NOTIFICATION
08/29/23 2049   Provider Notification   Provider Name/Title G2 Alvaro   Method of Notification Electronic Page   Request Evaluate in Person   Notification Reason Status Update     Transfer from [facility] is here, please come assess and put orders in. Dr. Lucero accepted her transfer. Thanks!

## 2023-08-30 NOTE — PROGRESS NOTES
Patient arrived to [facility] unit via  EMS  at 2055 ,with belongings, accompanied by spouse/ significant other. Received report from  Charge RN . Unit and room orientation completd. Call light given and within arms reach; no concerns present at this time. Continue with plan of care.

## 2023-08-31 VITALS
TEMPERATURE: 97.7 F | SYSTOLIC BLOOD PRESSURE: 112 MMHG | DIASTOLIC BLOOD PRESSURE: 77 MMHG | RESPIRATION RATE: 16 BRPM | HEART RATE: 81 BPM

## 2023-08-31 PROCEDURE — 250N000013 HC RX MED GY IP 250 OP 250 PS 637

## 2023-08-31 RX ORDER — OXYCODONE HYDROCHLORIDE 5 MG/1
5 TABLET ORAL EVERY 6 HOURS PRN
Qty: 3 TABLET | Refills: 0 | Status: SHIPPED | OUTPATIENT
Start: 2023-08-31

## 2023-08-31 RX ORDER — OXYCODONE HYDROCHLORIDE 5 MG/1
5 TABLET ORAL
Qty: 3 TABLET | Refills: 0 | Status: SHIPPED | OUTPATIENT
Start: 2023-08-31 | End: 2023-08-31

## 2023-08-31 RX ORDER — ACETAMINOPHEN AND CODEINE PHOSPHATE 120; 12 MG/5ML; MG/5ML
0.35 SOLUTION ORAL DAILY
Qty: 90 TABLET | Refills: 3 | Status: SHIPPED | OUTPATIENT
Start: 2023-08-31

## 2023-08-31 RX ADMIN — CETIRIZINE HYDROCHLORIDE 10 MG: 10 TABLET, FILM COATED ORAL at 07:50

## 2023-08-31 RX ADMIN — ACETAMINOPHEN 975 MG: 325 TABLET, FILM COATED ORAL at 04:22

## 2023-08-31 RX ADMIN — PRENATAL VIT W/ FE FUMARATE-FA TAB 27-0.8 MG 1 TABLET: 27-0.8 TAB at 07:50

## 2023-08-31 RX ADMIN — ACETAMINOPHEN 975 MG: 325 TABLET, FILM COATED ORAL at 10:18

## 2023-08-31 RX ADMIN — IBUPROFEN 800 MG: 800 TABLET ORAL at 04:22

## 2023-08-31 RX ADMIN — IBUPROFEN 800 MG: 800 TABLET ORAL at 10:18

## 2023-08-31 ASSESSMENT — ACTIVITIES OF DAILY LIVING (ADL)
ADLS_ACUITY_SCORE: 18

## 2023-08-31 NOTE — PROGRESS NOTES
Post Partum Progress Note  POD#3    Subjective:  She is resting comfortably in bed this morning. She reports a mild headache that has improved with rest. Pain is improving and well controlled on current medication regimen. She is tolerating PO intake. Lochia present and minimal. She is voiding without difficulty. She has passed flatus and has had two BMs. She is ambulating without dizziness or difficulty. She denies changes in vision, nausea/vomiting, chest pain, shortness of breath, RUQ pain, or worsening edema.  Plans to breast feed.    Objective:  Vitals:    23 1647 23 2115 23 0418 23 0749   BP: 113/80 123/77 120/76 112/77   BP Location: Left arm Left arm  Left arm   Patient Position: Semi-Lloyd's Semi-Lloyd's  Sitting   Cuff Size: Adult Regular Adult Regular  Adult Regular   Pulse: 71 77 78 81   Resp: 16 16 16 16   Temp: 97.7  F (36.5  C) 97.9  F (36.6  C) 97.9  F (36.6  C) 97.7  F (36.5  C)   TempSrc: Oral Oral Oral Oral     General: Well-appearing, NAD  CV: Well perfused, acyanotic, distal pulses palpated  Pulm: Normal respiratory effort on room air  Abd: Soft, non-tender, non-distended. Fundus is firm and 1 cm below the umbilicus.    Incision: Incision is clean, dry, intact  Ext: Trace lower extremity edema bilaterally. No calf tenderness.    Assessment/Plan:  Rosibel Saeed is a 28 year old  female who is POD#3 s/p STAT PLTCS under GETA at [facility]. Patient was transferred here to be with baby who needed NICU assistance. Baby now out of NICU in room with patient and stable. Patient doing well postoperatively, and would like to go home.    - Encourage routine post-operative goals including ambulation and incentive spirometry  - PNC: Rh positive. Rubella immune. No intervention indicated.  - Pain: controlled on oral medications  - Heme: Hgb 10.7>> 10.3.  - GI: continue anti-emetics and stool softeners as needed.  - : Voiding spontaneously.  - Infant: Stable in room,  s/p NICU stay  - Feeding: Plans on breast feeding.  - BC: Plans on POPs    # Transient LOC  - CT at [facility] unremarkable  - Neurological exam here wnl  - No similar episodes in this admission  - Suspect likely vasovagal episode, would recommend outpatient neurology follow up if similar symptoms reccur.    Discharge to home today    Kylee John MD  ObGyn Resident, PGY-2  9:12 AM 8/31/2023     Appreciate note by Dr. John. Patient has been seen and examined by me separate from the resident, agree with above note.     Kizzy Mckeon MD  9:46 AM

## 2023-08-31 NOTE — PLAN OF CARE
Goal Outcome Evaluation:   Problem: Plan of Care - These are the overarching goals to be used throughout the patient stay.    Goal: Optimal Comfort and Wellbeing  Intervention: Provide Person-Centered Care  Recent Flowsheet Documentation  Taken 8/31/2023 0900 by Lizzie Gomez, RN  Trust Relationship/Rapport:   care explained   choices provided    VSS, postpartum assessment WDL.  Scant amount of lochia, fundus firm and below U.  Pain very minimal- pt reports slight H/A this morning which has improved.  Breastfeeding infant with good latch, declined lactation consult.  Denies any pre-e symptoms.  Tolerating PO, voiding w/o difficulty, +BM.   at bedside and supportive, continue with education and discharge home this morning.

## 2023-08-31 NOTE — PLAN OF CARE
Goal Outcome Evaluation:  VSS and postpartum assessments WDL. Pt complained of a headache, due to not getting enough rest (per pt), tylenol and ibuprofen given, pt denies shortness of breath, vision changes and RUQ pain, bp 120/76. Up ad aakash with steady gait and independent with cares.  Bonding well with infant.  Breastfeeding on cue every 2 hours and supplementing with 10-25mL of formula.  Pain managed with tylenol and ibuprofen.   present and supportive.  Will continue with postpartum cares and education per plan of care.    Plan of Care Reviewed With: patient    Overall Patient Progress: improvingOverall Patient Progress: improving

## 2023-08-31 NOTE — PLAN OF CARE
Goal Outcome Evaluation: All discharge education complete, AVS signed and reviewed with pt, discharge medications given to patient and all questions answered, pt ready for discharge.     Swaddle gift given to family.

## 2023-09-01 ENCOUNTER — PATIENT OUTREACH (OUTPATIENT)
Dept: CARE COORDINATION | Facility: CLINIC | Age: 28
End: 2023-09-01
Payer: COMMERCIAL

## 2023-09-01 NOTE — PROGRESS NOTES
"[facility] offered Clinic Care Coordination to an established [facility] eligible patient and patient declined CCC at this time.     Clinic Care Coordination Contact  [facility]: Post-Discharge Note  SITUATION                                                      Admission:    Admission Date: 23   Reason for Admission: POD#1 s/p STAT CS, Transient Loss of consciousness, Chronic Anemia, Baby in NICU  Discharge:   Discharge Date: 23  Discharge Diagnosis: POD#3    BACKGROUND                                                      Per hospital discharge summary and inpatient provider notes:    Ms. Rosibel Saeed is a 28 year old   who initially presented at 39w1d as a transfer of care from [facility] due to baby requiring higher order of care.  Prior to presentation patient underwent a stat primary  section under general anesthesia due to fetal bradycardia.  Just prior to stat  section patient was noted to have seizure-like activity/syncopal event.  Following  section patient was evaluated by [facility] neurology team and head CT was obtained.  No notable findings.  Patient was instructed to follow-up with neurology as an outpatient.     ASSESSMENT      Discharge Assessment  How are you doing now that you are home?: \"I'm doing good. I think I have everything.\"  How are your symptoms? (Red Flag symptoms escalate to triage hotline per guidelines): Improved  Do you feel your condition is stable enough to be safe at home until your provider visit?: Yes  Does the patient have their discharge instructions? : Yes  Does the patient have questions regarding their discharge instructions? : No  Were you started on any new medications or were there changes to any of your previous medications? : Yes  Does the patient have all of their medications?: Yes  Do you have questions regarding any of your medications? : No  Do you have all of your needed medical supplies or equipment (DME)?  (i.e. " oxygen tank, CPAP, cane, etc.): Yes  Discharge follow-up appointment scheduled within 14 calendar days? : Yes  Discharge Follow Up Appointment Date: 09/03/23  Discharge Follow Up Appointment Scheduled with?: Specialty Care Provider (OB/GYN appt.)    Post-op ([facility] CTA Only)  If the patient had a surgery or procedure, do they have any questions for a nurse?: No    PLAN                                                      Outpatient Plan:      Follow Up  Follow up with provider in 2 weeks and 6 weeks for post-delivery checks    Additional Services:   Postpartum Home Care Referral  If your home visit was not scheduled during your hospital stay, you should receive a call from [facility] within 24 hours after discharge to schedule your ordered home visit. If you have not heard by then, please call [phone].    No future appointments.      For any urgent concerns, please contact our 24 hour nurse triage line: [phone] ([phone])         ASHLEY Junior  [phone]  [facility]

## 2023-10-10 ENCOUNTER — PRENATAL OFFICE VISIT (OUTPATIENT)
Dept: OBGYN | Facility: CLINIC | Age: 28
End: 2023-10-10
Attending: PHYSICIAN ASSISTANT
Payer: COMMERCIAL

## 2023-10-10 VITALS
BODY MASS INDEX: 33.84 KG/M2 | HEART RATE: 105 BPM | HEIGHT: 63 IN | WEIGHT: 191 LBS | SYSTOLIC BLOOD PRESSURE: 115 MMHG | DIASTOLIC BLOOD PRESSURE: 73 MMHG | RESPIRATION RATE: 18 BRPM | TEMPERATURE: 98.1 F

## 2023-10-10 DIAGNOSIS — Z86.2 HISTORY OF ANEMIA: ICD-10-CM

## 2023-10-10 LAB — HGB BLD-MCNC: 13.2 G/DL (ref 11.7–15.7)

## 2023-10-10 PROCEDURE — 36415 COLL VENOUS BLD VENIPUNCTURE: CPT | Performed by: PHYSICIAN ASSISTANT

## 2023-10-10 PROCEDURE — 99207 PR POST PARTUM EXAM: CPT | Performed by: PHYSICIAN ASSISTANT

## 2023-10-10 PROCEDURE — 85018 HEMOGLOBIN: CPT | Performed by: PHYSICIAN ASSISTANT

## 2023-10-10 ASSESSMENT — PATIENT HEALTH QUESTIONNAIRE - PHQ9
SUM OF ALL RESPONSES TO PHQ QUESTIONS 1-9: 2
5. POOR APPETITE OR OVEREATING: SEVERAL DAYS

## 2023-10-10 ASSESSMENT — ANXIETY QUESTIONNAIRES
5. BEING SO RESTLESS THAT IT IS HARD TO SIT STILL: NOT AT ALL
GAD7 TOTAL SCORE: 4
GAD7 TOTAL SCORE: 4
1. FEELING NERVOUS, ANXIOUS, OR ON EDGE: NOT AT ALL
6. BECOMING EASILY ANNOYED OR IRRITABLE: SEVERAL DAYS
7. FEELING AFRAID AS IF SOMETHING AWFUL MIGHT HAPPEN: NOT AT ALL
3. WORRYING TOO MUCH ABOUT DIFFERENT THINGS: SEVERAL DAYS
2. NOT BEING ABLE TO STOP OR CONTROL WORRYING: SEVERAL DAYS
IF YOU CHECKED OFF ANY PROBLEMS ON THIS QUESTIONNAIRE, HOW DIFFICULT HAVE THESE PROBLEMS MADE IT FOR YOU TO DO YOUR WORK, TAKE CARE OF THINGS AT HOME, OR GET ALONG WITH OTHER PEOPLE: NOT DIFFICULT AT ALL

## 2023-10-10 NOTE — NURSING NOTE
"Initial /73 (BP Location: Right arm, Patient Position: Chair, Cuff Size: Adult Regular)   Pulse 105   Temp 98.1  F (36.7  C) (Tympanic)   Resp 18   Ht 1.588 m (5' 2.5\")   Wt 86.6 kg (191 lb)   LMP 11/21/2022   BMI 34.38 kg/m   Estimated body mass index is 34.38 kg/m  as calculated from the following:    Height as of this encounter: 1.588 m (5' 2.5\").    Weight as of this encounter: 86.6 kg (191 lb). .    "

## 2023-10-10 NOTE — PROGRESS NOTES
"[facility]    Post Partum Office Visit    CC: Post partum visit    HPI: Rosibel Saeed is a 28 year old  who presents for a 6 week post-partum visit.  Patient delivered on 23, by Dr. Alvarado at 39w1d gestation.  Patient delivered via c/s for maternal and obstetrical complications. Mother with transient loss of consciousness associated with hypotension and fetal bradycardia not present on admission. Patient with chronic anemia worsened by acute anemia secondary to blood loss. Patient was prescribed iron, vitamin C, and Vitamin B12 , but has not taken any of these medications. She denies any signs/symptoms of anemia currently (headache, dizziness, lighte headedness, shortness of breath, pale skin, or persistent bleeding). Mother states baby was transferred down to [facility] NICU for concern of hole in her heart. Patient discharge a day after after seeing cardiac specialist. Plan to re-check baby in a few months. No other limitations or changes to babies care at this time.        Information  Name: Leatha  Sex: female  Weight: 9 lbs. 0 oz.  Complications: intrauterine bradycardia. Mother states patient transferred to [facility] and found to have \"3 holes in her heart\" plan to monitor and re-evaluate. Patient discharged from NICU a day later.   Feeding Method: breastfeeding and supplementing with formula at night.     Maternal Information  Postpartum Depression: no, but patient states her spouse is not helping much with their 2 year old or the infant. She states she does have family support in [place] and is going to ask them for help. She denies thoughts of hurting herself or others.  She feels safe at home and safe with her kids.  She also feels comfortable leaving her house and actually enjoys getting out with her kids.  Resumed [place]: no   Last Pap Smear: 22 NILM   Birth Control Method:mini pill / progesterone only pill already prescribed to her, but she has " "not started it yet.     Patient states normal bowel movements, no urinary symptoms, no vaginal bleeding, and no breast issues or concerns with feeding baby.     ROS:  General/Constitutional:  Denies chills or fever  Respiratory: Denies shortness of breath, cough, wheezing   Cardiovascular: Denies chest pain, exertional pain, irregular heartbeat  Gastrointestinal:  Denies abdominal pain, constipation, nausea, or vomiting  Genitourinary: Denies hematuria, difficulty urinating, frequency, or dysuria   Skin: Denies dry skin, itching, rash, new skin lesions; no drainage, pain, redness, or warmth to the  scar.   Musculoskeletal: Denies aching muscles or joints, swelling in joints, back pain, shoulder pain, or painful feet, no peripheral edema  Psychiatric: Denies post partum depression    Physical Exam:   There were no vitals filed for this visit.   Estimated body mass index is 38.36 kg/m  as calculated from the following:    Height as of 23: 1.588 m (5' 2.5\").    Weight as of 23: 96.7 kg (213 lb 1.6 oz).    General appearance: well-hydrated, A&O x 3, no apparent distress  Breathing; speaks full sentences without wheezing, or acute respiratory distress.   Abdomen: Soft, non-tender, non-distended. No rebound, rigidity, or guarding. Well healed transverse lower abdominal  scar with no wound dehiscence, erythema, tenderness or drainage.   Extremities: no edema, no calf tenderness  Neuro: CN II-XII grossly intact  Genitourinary: offered pelvic exam, but patient declined.     Assessment and Plan:     (Z39.2) Routine postpartum follow-up  (primary encounter diagnosis)  Plan: Hemoglobin          (Z86.2) History of anemia  Plan: Hemoglobin          Appropriate post partum recovery. No more lifting restrictions. Can resume intercourse.     Plan for micronor for contraception. Patient already has prescription for this.     Plan for routine GYN cares, PAP smear due 25.     Rechecked patient's " hemoglobin today since she is not taking any of the prescribed medications for anemia. Hemoglobin today was within normal limits.     Offered mental health referral for counseling, but patient declined. Discussed that patient can reach out to us at anytime if she feels like she needs more support. She plans to ask her family for more help with her kids until she can get into a routine.     Lia Anaya PA-C

## 2023-10-14 ENCOUNTER — HEALTH MAINTENANCE LETTER (OUTPATIENT)
Age: 28
End: 2023-10-14

## 2024-05-24 NOTE — PROGRESS NOTES
"[facility]    Return OB Note    CC: Return OB     Subjective:  Rosibel is a 28 year old  at 34w1d   Denies vaginal bleeding, loss of fluid, or regular contractions. Good fetal movement.  Complaints today: None    Objective:  /78 (BP Location: Left arm, Patient Position: Chair, Cuff Size: Adult Regular)   Pulse 88   Temp 98.1  F (36.7  C) (Tympanic)   Resp 18   Ht 1.588 m (5' 2.5\")   Wt 90.7 kg (200 lb)   LMP 2022   BMI 36.00 kg/m      Fundal height: 34cm  FHT: 150bpm    Assessment/Plan:   Encounter Diagnosis   Name Primary?    Prenatal care, subsequent pregnancy in third trimester Yes       IUP at 34w1d  -NOB labs normal. Mid trimester labs- failed 1 hour GTT, passed 3 hr GTT  -Genetic screening: NIPT low risk  -Anatomy US: choroid plexus cyst and echogenic intracardiac focus. L2 CPC resolved, EIF present, genetic screening ordered and normal. No follow up needed.  -S/p Tdap vaccine  -Strict return precautions given    RTC 2 weeks    Luisana Caba DO" cancer

## 2024-12-01 ENCOUNTER — HEALTH MAINTENANCE LETTER (OUTPATIENT)
Age: 29
End: 2024-12-01

## 2025-07-15 NOTE — LETTER
[facility]  [address] [address]  Phone: [phone]    March 13, 2023        Rosibel Saeed  [address] [place] [id number]          To whom it may concern:    RE: Rosibel Saeed    Ms. Saeed is a patient under my care for her pegnancy.   Estimated Date of Delivery: Sep 3, 2023        Sincerely,        Usha Engle MD   musculoskeletal

## (undated) DEVICE — CATH TRAY FOLEY SURESTEP 16FR W/URINE MTR STATLK LF A303416A

## (undated) DEVICE — DRAPE SHEET REV FOLD 3/4 9349

## (undated) DEVICE — SOL NACL 0.9% IRRIG 1000ML BOTTLE 07138-09

## (undated) DEVICE — SU VICRYL 2-0 CT-1 36" UND J945H

## (undated) DEVICE — SU DERMABOND ADVANCED .7ML DNX12

## (undated) DEVICE — SU CHROMIC 0 BP-1 27" 47T

## (undated) DEVICE — PREP CHLORAPREP 26ML TINTED ORANGE  260815

## (undated) DEVICE — SUTURE ABSORBABLE VICRYL CT-B1 L36 IN BRAID VIOLET JB947H

## (undated) DEVICE — PACK C-SECTION LF PL15OTA83B

## (undated) DEVICE — SU WND CLOSURE VLOC 90 ABS 3-0 18" P-14 VLOCM0124

## (undated) DEVICE — GLOVE BIOGEL PI MICRO INDICATOR UNDERGLOVE SZ 7.5 48975

## (undated) DEVICE — SUCTION MANIFOLD NEPTUNE 2 SYS 1 PORT 702-025-000

## (undated) DEVICE — SU VICRYL 0 CT-1 36" J946H

## (undated) DEVICE — SOL WATER IRRIG 1000ML BOTTLE 07139-09

## (undated) DEVICE — GOWN IMPERVIOUS SPECIALTY XLG/XLONG 32474

## (undated) RX ORDER — ONDANSETRON 2 MG/ML
INJECTION INTRAMUSCULAR; INTRAVENOUS
Status: DISPENSED
Start: 2023-08-28

## (undated) RX ORDER — OXYTOCIN/0.9 % SODIUM CHLORIDE 30/500 ML
PLASTIC BAG, INJECTION (ML) INTRAVENOUS
Status: DISPENSED
Start: 2023-08-28

## (undated) RX ORDER — LIDOCAINE HCL/EPINEPHRINE/PF 2%-1:200K
VIAL (ML) INJECTION
Status: DISPENSED
Start: 2023-08-28

## (undated) RX ORDER — LIDOCAINE HYDROCHLORIDE 10 MG/ML
INJECTION, SOLUTION EPIDURAL; INFILTRATION; INTRACAUDAL; PERINEURAL
Status: DISPENSED
Start: 2023-08-28

## (undated) RX ORDER — CEFAZOLIN SODIUM 1 G/3ML
INJECTION, POWDER, FOR SOLUTION INTRAMUSCULAR; INTRAVENOUS
Status: DISPENSED
Start: 2023-08-28

## (undated) RX ORDER — DEXAMETHASONE SODIUM PHOSPHATE 4 MG/ML
INJECTION, SOLUTION INTRA-ARTICULAR; INTRALESIONAL; INTRAMUSCULAR; INTRAVENOUS; SOFT TISSUE
Status: DISPENSED
Start: 2023-08-28

## (undated) RX ORDER — FENTANYL CITRATE 50 UG/ML
INJECTION, SOLUTION INTRAMUSCULAR; INTRAVENOUS
Status: DISPENSED
Start: 2023-08-28

## (undated) RX ORDER — OXYTOCIN 10 [USP'U]/ML
INJECTION, SOLUTION INTRAMUSCULAR; INTRAVENOUS
Status: DISPENSED
Start: 2023-08-28

## (undated) RX ORDER — PROPOFOL 10 MG/ML
INJECTION, EMULSION INTRAVENOUS
Status: DISPENSED
Start: 2023-08-28